# Patient Record
Sex: FEMALE | Race: BLACK OR AFRICAN AMERICAN | Employment: OTHER | URBAN - METROPOLITAN AREA
[De-identification: names, ages, dates, MRNs, and addresses within clinical notes are randomized per-mention and may not be internally consistent; named-entity substitution may affect disease eponyms.]

---

## 2017-01-03 DIAGNOSIS — E55.9 VITAMIN D DEFICIENCY: ICD-10-CM

## 2017-01-04 RX ORDER — NICOTINE 11MG/24HR
PATCH, TRANSDERMAL 24 HOURS TRANSDERMAL
Qty: 90 CAP | Refills: 1 | Status: SHIPPED | OUTPATIENT
Start: 2017-01-04 | End: 2017-08-02 | Stop reason: SDUPTHER

## 2017-01-04 RX ORDER — POTASSIUM CHLORIDE 20 MEQ/1
TABLET, EXTENDED RELEASE ORAL
Qty: 30 TAB | Refills: 5 | Status: SHIPPED | OUTPATIENT
Start: 2017-01-04 | End: 2017-08-14 | Stop reason: SDUPTHER

## 2017-01-05 RX ORDER — FUROSEMIDE 40 MG/1
TABLET ORAL
Qty: 30 TAB | Refills: 1 | Status: SHIPPED | OUTPATIENT
Start: 2017-01-05 | End: 2019-11-18

## 2017-01-09 ENCOUNTER — OFFICE VISIT (OUTPATIENT)
Dept: FAMILY MEDICINE CLINIC | Age: 42
End: 2017-01-09

## 2017-01-09 ENCOUNTER — TELEPHONE (OUTPATIENT)
Dept: FAMILY MEDICINE CLINIC | Age: 42
End: 2017-01-09

## 2017-01-09 VITALS
BODY MASS INDEX: 38.52 KG/M2 | TEMPERATURE: 98 F | RESPIRATION RATE: 18 BRPM | OXYGEN SATURATION: 98 % | SYSTOLIC BLOOD PRESSURE: 118 MMHG | HEIGHT: 67 IN | WEIGHT: 245.4 LBS | DIASTOLIC BLOOD PRESSURE: 72 MMHG | HEART RATE: 84 BPM

## 2017-01-09 DIAGNOSIS — G89.29 CHRONIC NECK PAIN: ICD-10-CM

## 2017-01-09 DIAGNOSIS — M54.2 CHRONIC NECK PAIN: ICD-10-CM

## 2017-01-09 DIAGNOSIS — F31.9 BIPOLAR AFFECTIVE DISORDER, REMISSION STATUS UNSPECIFIED (HCC): ICD-10-CM

## 2017-01-09 RX ORDER — CYCLOBENZAPRINE HCL 10 MG
TABLET ORAL
Qty: 20 TAB | Refills: 0 | Status: ON HOLD | OUTPATIENT
Start: 2017-01-09 | End: 2017-11-07

## 2017-01-09 RX ORDER — PHENTERMINE HYDROCHLORIDE 37.5 MG/1
37.5 TABLET ORAL
Qty: 30 TAB | Refills: 0 | Status: SHIPPED | OUTPATIENT
Start: 2017-01-09 | End: 2017-07-26 | Stop reason: SDUPTHER

## 2017-01-09 RX ORDER — ALPRAZOLAM 1 MG/1
1 TABLET ORAL
Status: CANCELLED | OUTPATIENT
Start: 2017-01-09

## 2017-01-09 NOTE — MR AVS SNAPSHOT
Visit Information Date & Time Provider Department Dept. Phone Encounter #  
 1/9/2017  2:15 PM Jonathon Kasper MD [facility] [phone] [id number] Follow-up Instructions Return in about 4 weeks (around 2/6/2017) for weight loss. Upcoming Health Maintenance Date Due  
 EYE EXAM RETINAL OR DILATED Q1 9/27/1987 Pneumococcal 19-64 Medium Risk (1 of 1 - PPSV23) 9/27/1996 PAP AKA CERVICAL CYTOLOGY 9/27/1998 MICROALBUMIN Q1 2/26/2017 HEMOGLOBIN A1C Q6M 5/11/2017 FOOT EXAM Q1 5/26/2017 LIPID PANEL Q1 11/11/2017 DTaP/Tdap/Td series (2 - Td) 3/10/2024 Allergies as of 1/9/2017  Review Complete On: 12/1/2016 By: Kvng Mcnamara NP Severity Noted Reaction Type Reactions Pcn [Penicillins] High 05/03/2010    Swelling Swelling throat Shellfish Containing Products High 03/06/2011    Swelling Swells throat and eyes Current Immunizations  Reviewed on 5/26/2016 Name Date Tdap 3/10/2014  5:21 PM  
  
 Not reviewed this visit You Were Diagnosed With   
  
 Codes Comments Bipolar affective disorder, remission status unspecified ([facility]    -  Primary ICD-10-CM: F31.9 ICD-9-CM: 296.80 Abnormal weight gain     ICD-10-CM: R63.5 ICD-9-CM: 783.1 Obesity, Class II, BMI 35-39.9, with comorbidity (HCC)     ICD-10-CM: E66.01 
ICD-9-CM: 278.01 Vitals BP Pulse Temp Resp Height(growth percentile) Weight(growth percentile) 118/72 (BP 1 Location: Left arm, BP Patient Position: Sitting) 84 98 °F (36.7 °C) (Oral) 18 5' 7\" (1.702 m) 245 lb 6.4 oz (111.3 kg) SpO2 BMI OB Status Smoking Status 98% 38.44 kg/m2 Hysterectomy Never Smoker BMI and BSA Data Body Mass Index Body Surface Area  
 38.44 kg/m 2 2.29 m 2 Preferred Pharmacy Pharmacy Name Phone [id number]. Jewel Males, [address] [phone] Your Updated Medication List  
  
   
 This list is accurate as of: 1/9/17  3:11 PM.  Always use your most recent med list.  
  
  
  
  
 amitriptyline 25 mg tablet Commonly known as:  ELAVIL TAKE 1 TABLET BY MOUTH NIGHTLY  
  
 aspirin delayed-release 81 mg tablet Take 81 mg by mouth daily. atenolol 25 mg tablet Commonly known as:  TENORMIN  
take 1 tablet by mouth once daily BD LUER-MARIELY SYRINGE 3 mL 25 gauge x 1\" Syrg Generic drug:  Syringe with Needle (Disp) USE TO INJECT 1 ML EVERY 2 WEEKS  
  
 cyanocobalamin 1,000 mcg/mL injection Commonly known as:  VITAMIN B12  
EVERY OTHER WEEK  
  
 cyclobenzaprine 10 mg tablet Commonly known as:  FLEXERIL  
take 1 tablet by mouth three times a day if needed for muscle spasm FISH OIL PO Take  by mouth daily. furosemide 40 mg tablet Commonly known as:  LASIX  
take 1 tablet by mouth once daily  
  
 gabapentin 300 mg capsule Commonly known as:  NEURONTIN Take 1 Cap by mouth three (3) times daily. glimepiride 2 mg tablet Commonly known as:  AMARYL  
take 1 tablet twice a day HAIR,SKIN AND NAILS PO Take  by mouth daily. insulin glargine 100 unit/mL (3 mL) pen Commonly known as:  LANTUS SOLOSTAR INJECT 30 UNITS DAILY LINZESS 145 mcg Cap capsule Generic drug:  linaclotide TAKE ONE CAPSULE BY MOUTH EVERY DAY BEFORE BREAKFAST  
  
 loratadine 10 mg tablet Commonly known as:  Mary Pace Take 1 Tab by mouth daily. metFORMIN  mg tablet Commonly known as:  GLUCOPHAGE XR  
take 1 tablet by mouth once daily  
  
 montelukast 10 mg tablet Commonly known as:  SINGULAIR  
take 1 tablet by mouth once daily MULTIVITAMIN PO Take  by mouth daily. NYSTOP powder Generic drug:  nystatin  
two (2) times a day. phentermine 37.5 mg tablet Commonly known as:  ADIPEX-P Take 1 Tab by mouth every morning. Max Daily Amount: 37.5 mg.  
  
 polyethylene glycol 17 gram packet Commonly known as:  Corina Aguilar Take 1 Packet by mouth daily. potassium chloride 20 mEq tablet Commonly known as:  K-DUR, KLOR-CON  
take 1 tablet by mouth once daily  
  
 pravastatin 40 mg tablet Commonly known as:  PRAVACHOL Take 1 Tab by mouth nightly. CHANGE IN THERAPY  
  
 traZODone 100 mg tablet Commonly known as:  DESYREL  
take 1 tablet by mouth at bedtime VITAMIN D3 2,000 unit Cap capsule Generic drug:  Cholecalciferol (Vitamin D3)  
take 1 capsule by mouth once daily VITAMIN E PO Take  by mouth daily. XANAX 1 mg tablet Generic drug:  ALPRAZolam  
Take 1 mg by mouth three (3) times daily as needed. Prescriptions Printed Refills  
 phentermine (ADIPEX-P) 37.5 mg tablet 0 Sig: Take 1 Tab by mouth every morning. Max Daily Amount: 37.5 mg.  
 Class: Print Route: Oral  
  
Prescriptions Sent to Pharmacy Refills  
 cyclobenzaprine (FLEXERIL) 10 mg tablet 0 Sig: take 1 tablet by mouth three times a day if needed for muscle spasm Class: Normal  
 Pharmacy: [address] #: [phone] We Performed the Following REFERRAL TO PSYCHIATRY [REF91 Custom] Follow-up Instructions Return in about 4 weeks (around 2/6/2017) for weight loss. Referral Information Referral ID Referred By Referred To  
  
 [id number] [place], [address] [address] [address] [place] [address] Phone: [phone] Fax: [phone] Visits Status Start Date End Date 1 New Request 1/9/17 1/9/18 If your referral has a status of pending review or denied, additional information will be sent to support the outcome of this decision. Patient Instructions Starting a Weight Loss Plan: Care Instructions Your Care Instructions If you are thinking about losing weight, it can be hard to know where to start. Your doctor can help you set up a weight loss plan that best meets your needs. You may want to take a class on nutrition or exercise, or join a weight loss support group. If you have questions about how to make changes to your eating or exercise habits, ask your doctor about seeing a registered dietitian or an exercise specialist. 
It can be a big challenge to lose weight. But you do not have to make huge changes at once. Make small changes, and stick with them. When those changes become habit, add a few more changes. If you do not think you are ready to make changes right now, try to pick a date in the future. Make an appointment to see your doctor to discuss whether the time is right for you to start a plan. Follow-up care is a key part of your treatment and safety. Be sure to make and go to all appointments, and call your doctor if you are having problems. Its also a good idea to know your test results and keep a list of the medicines you take. How can you care for yourself at home? · Set realistic goals. Many people expect to lose much more weight than is likely. A weight loss of 5% to 10% of your body weight may be enough to improve your health. · Get family and friends involved to provide support. Talk to them about why you are trying to lose weight, and ask them to help. They can help by participating in exercise and having meals with you, even if they may be eating something different. · Find what works best for you. If you do not have time or do not like to cook, a program that offers meal replacement bars or shakes may be better for you. Or if you like to prepare meals, finding a plan that includes daily menus and recipes may be best. 
· Ask your doctor about other health professionals who can help you achieve your weight loss goals. ¨ A dietitian can help you make healthy changes in your diet.  
¨ An exercise specialist or  can help you develop a safe and effective exercise program. 
¨ A counselor or psychiatrist can help you cope with issues such as depression, anxiety, or family problems that can make it hard to focus on weight loss. · Consider joining a support group for people who are trying to lose weight. Your doctor can suggest groups in your area. Where can you learn more? Go to http://marissa-marjan.info/. Enter X280 in the search box to learn more about \"Starting a Weight Loss Plan: Care Instructions. \" Current as of: February 16, 2016 Content Version: 11.1 © 0838-7356 [employer]. Care instructions adapted under license by [employer] (which disclaims liability or warranty for this information). If you have questions about a medical condition or this instruction, always ask your healthcare professional. [address] any warranty or liability for your use of this information. Introducing [facility]! Yony Rojas introduces [employer]ist patient portal. Now you can access parts of your medical record, email your doctor's office, and request medication refills online. 1. In your internet browser, go to [email or web address] 2. Click on the First Time User? Click Here link in the Sign In box. You will see the New Member Sign Up page. 3. Enter your [employer] Access Code exactly as it appears below. You will not need to use this code after youve completed the sign-up process. If you do not sign up before the expiration date, you must request a new code. · [employer] Access Code: [email or web address] Expires: 2/9/2017  9:20 AM 
 
4. Enter the last four digits of your Social Security Number (xxxx) and Date of Birth (mm/dd/yyyy) as indicated and click Submit. You will be taken to the next sign-up page. 5. Create a [employer] ID. This will be your [employer] login ID and cannot be changed, so think of one that is secure and easy to remember. 6. Create a [employer] password. You can change your password at any time. 7. Enter your Password Reset Question and Answer. This can be used at a later time if you forget your password. 8. Enter your e-mail address. You will receive e-mail notification when new information is available in [address] 9. Click Sign Up. You can now view and download portions of your medical record. 10. Click the Download Summary menu link to download a portable copy of your medical information. If you have questions, please visit the Frequently Asked Questions section of the [employer] website. Remember, [employer] is NOT to be used for urgent needs. For medical emergencies, dial 911. Now available from your iPhone and Android! Please provide this summary of care documentation to your next provider. Your primary care clinician is listed as Linn Juarez. If you have any questions after today's visit, please call [phone].

## 2017-01-09 NOTE — PATIENT INSTRUCTIONS

## 2017-01-09 NOTE — TELEPHONE ENCOUNTER
----- Message from Betty Bautista MD sent at 1/9/2017  4:21 PM EST -----  MJ, can you call the [facility] on file and discontinue any paper prescription of phentermine that Dr. Shahzad Bermudez previously signed? Pt has a new paper Rx for me today but I'd like to discontinue any standing Rx from Dr. Shahzad Bermudez if I am taking over her care. She will go to [facility] today with her new paper RX under my name. Thanks! Called [facility] and asked them to d/s any outstanding rx's of the phentermine from Dr Shahzad Bermudez.

## 2017-01-10 NOTE — PROGRESS NOTES
Patient Name: Elle Cabrales   MRN: [id number]    [address] [place] [address],[address] Stephanie Gutierrez is a 46 y.o. female who presents with the following: Transferring care from prior PCP Dr. Pita Sweet. Weight management  Started on phentermine on 4/29/16; 37.5 mg PO daily   Has not been taking it every day as prescribed but has been compliant for the past month. Feels like it works as her appetite is suppressed when taking it. Was down to 239 in 11/16 but now 245 lb today. Working diet but could improve on exercise regimen. Tolerating medication well without adverse side effects. Start weight: 254  Current weight:   Wt Readings from Last 3 Encounters:   01/09/17 245 lb 6.4 oz (111.3 kg)   12/01/16 244 lb (110.7 kg)   11/11/16 239 lb 6.4 oz (108.6 kg)     Hx of bipolar disorder  Previously followed by psychiatrist but has not re-establish care with a new psychiatrist since her prior doctor retired. Hx of inpatient psych stay in 9/2011 for SI. Denies current SI/HI but often struggles with mood swings and anxiety. Is only on amitriptyline and trazodone but otherwise not on a maintenance medication. Hx of chronic neck pain. Just establish care with Dr. Delores Dakins at [facility]. Has upcoming MRI of her neck next week to decide treatment plan. Has neck stiffness and pain which is most bothersome during sleep. Request for flexeril refill which helps her sleep and relax. Review of Systems   Constitutional: Negative. Negative for fever, malaise/fatigue and weight loss. Respiratory: Negative for cough, hemoptysis, shortness of breath and wheezing. Cardiovascular: Negative for chest pain, palpitations, leg swelling and PND. Gastrointestinal: Negative for abdominal pain, blood in stool, constipation, diarrhea, nausea and vomiting. Musculoskeletal: Positive for myalgias and neck pain. Psychiatric/Behavioral: Positive for depression.  Negative for hallucinations, memory loss, substance abuse and suicidal ideas. The patient is nervous/anxious. The patient does not have insomnia. The patient's medications, allergies, past medical history, surgical history, family history and social history were reviewed and updated where appropriate. Current Outpatient Prescriptions   Medication Sig    cyclobenzaprine (FLEXERIL) 10 mg tablet take 1 tablet by mouth three times a day if needed for muscle spasm    phentermine (ADIPEX-P) 37.5 mg tablet Take 1 Tab by mouth every morning. Max Daily Amount: 37.5 mg.    furosemide (LASIX) 40 mg tablet take 1 tablet by mouth once daily    potassium chloride (K-DUR, KLOR-CON) 20 mEq tablet take 1 tablet by mouth once daily    VITAMIN D3 2,000 unit cap capsule take 1 capsule by mouth once daily    atenolol (TENORMIN) 25 mg tablet take 1 tablet by mouth once daily    insulin glargine (LANTUS SOLOSTAR) 100 unit/mL (3 mL) pen INJECT 30 UNITS DAILY    pravastatin (PRAVACHOL) 40 mg tablet Take 1 Tab by mouth nightly. CHANGE IN THERAPY    metFORMIN ER (GLUCOPHAGE XR) 500 mg tablet take 1 tablet by mouth once daily    traZODone (DESYREL) 100 mg tablet take 1 tablet by mouth at bedtime    amitriptyline (ELAVIL) 25 mg tablet TAKE 1 TABLET BY MOUTH NIGHTLY    polyethylene glycol (MIRALAX) 17 gram packet Take 1 Packet by mouth daily.  montelukast (SINGULAIR) 10 mg tablet take 1 tablet by mouth once daily    glimepiride (AMARYL) 2 mg tablet take 1 tablet twice a day    MULTIVITAMIN PO Take  by mouth daily.  MULTIVITAMIN WITH MINERALS (HAIR,SKIN AND NAILS PO) Take  by mouth daily.  DOCOSAHEXANOIC ACID/EPA (FISH OIL PO) Take  by mouth daily.  VITAMIN E ACETATE (VITAMIN E PO) Take  by mouth daily.  LINZESS 145 mcg cap capsule TAKE ONE CAPSULE BY MOUTH EVERY DAY BEFORE BREAKFAST    gabapentin (NEURONTIN) 300 mg capsule Take 1 Cap by mouth three (3) times daily.     BD LUER-MARIELY SYRINGE 3 mL 25 gauge x 1\" syrg USE TO INJECT 1 ML EVERY 2 WEEKS    cyanocobalamin (VITAMIN B12) 1,000 mcg/mL injection EVERY OTHER WEEK    NYSTOP powder two (2) times a day.  loratadine (CLARITIN) 10 mg tablet Take 1 Tab by mouth daily.  aspirin delayed-release 81 mg tablet Take 81 mg by mouth daily. No current facility-administered medications for this visit. Allergies   Allergen Reactions    Pcn [Penicillins] Swelling     Swelling throat    Shellfish Containing Products Swelling     Swells throat and eyes       Past Medical History   Diagnosis Date    Anemia     Arthritis      lower back    Asthma      last attack 1994    Bipolar 1 disorder ([facility]     Chronic pain     Diabetes mellitus type 2, controlled ([facility] 9/14/2016     a1c 7.4% at outside EMR    GERD (gastroesophageal reflux disease)     High cholesterol     HTN, goal below 140/90     Migraines     Morbid obesity ([facility]     Unspecified sleep apnea      no cpap-NEVER TESTED       Past Surgical History   Procedure Laterality Date    Hx heent       wisdom teeth    Hx gyn       uterine ablation/BTL    Hx gyn       1 c section, 1 vaginal birth   [employer] breast surgery procedure unlisted  2/21/13      RIGHT BREAST EXCISION OF MULTIPLE SEBACEOUS CYSTS    Hx breast biopsy  6/5/2014     LEFT BREAST EXCISION SEBACEOUS CYST, RIGHT CHEST WALL SEBACEOUS CYST EXCISION performed by America Turcios MD at [facility] MAIN OR    Hx other surgical  1997     spinal tap - viral menigitis    Hx other surgical       SEBACEOUS CYST-BACK       Family History   Problem Relation Age of Onset    Cancer Maternal Aunt      OVARIAN;     Cancer Maternal Grandmother      bone cancer    Hypertension Mother     Diabetes Mother     Hypertension Father     Heart Disease Father      CHF    Cancer Paternal Grandmother 79     breast cancer    Anesth Problems Neg Hx        Social History     Social History    Marital status:      Spouse name: N/A    Number of children: N/A    Years of education: N/A     Occupational History    Not on file. Social History Main Topics    Smoking status: Never Smoker    Smokeless tobacco: Never Used    Alcohol use Yes      Comment: RARE    Drug use: No    Sexual activity: Not Currently     Birth control/ protection: None     Other Topics Concern    Not on file     Social History Narrative         OBJECTIVE      Visit Vitals    /72 (BP 1 Location: Left arm, BP Patient Position: Sitting)    Pulse 84    Temp 98 °F (36.7 °C) (Oral)    Resp 18    Ht 5' 7\" (1.702 m)    Wt 245 lb 6.4 oz (111.3 kg)    SpO2 98%    BMI 38.44 kg/m2       Physical Exam   Constitutional: She is oriented to person, place, and time and well-developed, well-nourished, and in no distress. No distress. HENT:   Head: Normocephalic and atraumatic. Right Ear: External ear normal.   Left Ear: External ear normal.   Eyes: Conjunctivae and EOM are normal. Pupils are equal, round, and reactive to light. Cardiovascular: Normal rate, regular rhythm and normal heart sounds. Exam reveals no gallop and no friction rub. No murmur heard. Pulmonary/Chest: Effort normal and breath sounds normal. No respiratory distress. She has no wheezes. Musculoskeletal: She exhibits no edema or deformity. Decreased ROM of cervical neck 2/2 pain. TTP along paraspinal cervical muscles. Neurological: She is alert and oriented to person, place, and time. No cranial nerve deficit. She exhibits normal muscle tone. Gait normal.   Skin: She is not diaphoretic. Psychiatric: Mood, memory, affect and judgment normal.   Nursing note and vitals reviewed. ASSESSMENT AND PLAN  Michael Pike is a 46 y.o. female who presents today for:     1. Obesity, Class II, BMI 35-39.9, with comorbidity ([address]  Pt has regain some prior weight due to lifestyle as well as medication non-compliance.    Will trial one more month of phentermine; if pt does not have continue weight loss success, consider referral to weight loss clinic for possible medication alternatives.  appropriate but does have some extra Rx on file at pharmacy under prior PCP Dr. Dejuan Sawyer name; will call pharmacy to discontinue any active Rx under Dr. Dejuan Sawyer name and give pt a new paper Rx with my name. - phentermine (ADIPEX-P) 37.5 mg tablet; Take 1 Tab by mouth every morning. Max Daily Amount: 37.5 mg.  Dispense: 30 Tab; Refill: 0    2. Bipolar affective disorder, remission status unspecified ([facility]  - REFERRAL TO PSYCHIATRY    3. Chronic neck pain  Will provide short term refill as pt undergoes work up with her new orthopedic doctor. - cyclobenzaprine (FLEXERIL) 10 mg tablet; take 1 tablet by mouth three times a day if needed for muscle spasm  Dispense: 20 Tab; Refill: 0    Medications Discontinued During This Encounter   Medication Reason    cyclobenzaprine (FLEXERIL) 10 mg tablet Reorder    phentermine (ADIPEX-P) 37.5 mg tablet Reorder    ALPRAZolam (XANAX) 1 mg tablet Not A Current Medication       Follow-up Disposition:  Return in about 4 weeks (around 2/6/2017) for weight loss. Time: 25 minutes was spent with this patient face to face discussing test results, follow up visits, and when repeat testing. I discussed diagnoses, risk factors and treatment for each based on current recommendations and literature. Greater than 50% of total visit time was spent in counseling and coordination of care. I have discussed the diagnosis with the patient and the intended plan as seen in the above orders. The patient has received an after-visit summary and questions were answered concerning future plans. I have discussed treatment side effects and warnings with the patient as well. Reviewed signs/symptoms of worsening condition that would require urgent evaluation.     Allie Keen M.D.

## 2017-02-07 ENCOUNTER — OFFICE VISIT (OUTPATIENT)
Dept: FAMILY MEDICINE CLINIC | Age: 42
End: 2017-02-07

## 2017-02-07 VITALS
TEMPERATURE: 98.4 F | WEIGHT: 244.13 LBS | SYSTOLIC BLOOD PRESSURE: 118 MMHG | RESPIRATION RATE: 18 BRPM | HEIGHT: 67 IN | OXYGEN SATURATION: 97 % | BODY MASS INDEX: 38.32 KG/M2 | HEART RATE: 71 BPM | DIASTOLIC BLOOD PRESSURE: 70 MMHG

## 2017-02-07 DIAGNOSIS — Z79.4 CONTROLLED TYPE 2 DIABETES MELLITUS WITHOUT COMPLICATION, WITH LONG-TERM CURRENT USE OF INSULIN (HCC): ICD-10-CM

## 2017-02-07 DIAGNOSIS — E78.00 HIGH CHOLESTEROL: ICD-10-CM

## 2017-02-07 DIAGNOSIS — E11.9 CONTROLLED TYPE 2 DIABETES MELLITUS WITHOUT COMPLICATION, WITH LONG-TERM CURRENT USE OF INSULIN (HCC): ICD-10-CM

## 2017-02-07 DIAGNOSIS — N64.59 ABNORMAL BREAST EXAM: ICD-10-CM

## 2017-02-07 DIAGNOSIS — Z71.3 WEIGHT LOSS COUNSELING, ENCOUNTER FOR: Primary | ICD-10-CM

## 2017-02-07 NOTE — PROGRESS NOTES
Patient Name: Elle Cabrales   MRN: [id number]    [address] [place] [address],[address] Stephanie Gutierrez is a 46 y.o. female who presents with the following:     Weight management  Started on phentermine on 4/29/16; 37.5 mg PO daily. Has not been taking it consistently due to cost and difficulty picking up medications on time. Has not been taking it for the past 2 weeks as she was not able to bring the new prescription to her pharmacy until now; still has the 1/9 RX available.  appropriate with last refill filled on 12/9/16. Is wondering if there is something \"stronger\" than phentermine. She is getting  in July and is worried she will not lose the weight in time. Is working on exercising regularly with her partner. Tends to have a smoothie in the morning and dinner at night; otherwise does not eat regularly. Start weight: 254    Wt Readings from Last 3 Encounters:   02/07/17 244 lb 2 oz (110.7 kg)   01/09/17 245 lb 6.4 oz (111.3 kg)   12/01/16 244 lb (110.7 kg)     Breast pain  Hx of multiple sebaceous cysts removed from her R and L breast. Feels that she feels a palpable lump along her R breast which she has had for a while but now it feels burning in nature when she touches it. Previously saw a breast surgeon in 2014 at [facility] but she has since retired. 7/8/2016 Mammogram/ultrasound:   BI-RADS 2, benign. There is no mammographic correlate to the palpable finding in the right breast. The accompanying breast ultrasound was also negative. Clinical followup is recommended. Any decision to biopsy should be based on clinical findings. There is no mammographic evidence of malignancy. Followup screening mammogram at age 38 is recommended. The patient was notified of these results. Review of Systems   Constitutional: Negative. Negative for fever, malaise/fatigue and weight loss. Respiratory: Negative for cough, hemoptysis, shortness of breath and wheezing.     Cardiovascular: Negative for chest pain, palpitations, leg swelling and PND. Gastrointestinal: Negative for abdominal pain, blood in stool, constipation, diarrhea, nausea and vomiting. Endo/Heme/Allergies:        R breast lump       The patient's medications, allergies, past medical history, surgical history, family history and social history were reviewed and updated where appropriate. Current Outpatient Prescriptions   Medication Sig    cyclobenzaprine (FLEXERIL) 10 mg tablet take 1 tablet by mouth three times a day if needed for muscle spasm    phentermine (ADIPEX-P) 37.5 mg tablet Take 1 Tab by mouth every morning. Max Daily Amount: 37.5 mg.    furosemide (LASIX) 40 mg tablet take 1 tablet by mouth once daily    potassium chloride (K-DUR, KLOR-CON) 20 mEq tablet take 1 tablet by mouth once daily    VITAMIN D3 2,000 unit cap capsule take 1 capsule by mouth once daily    atenolol (TENORMIN) 25 mg tablet take 1 tablet by mouth once daily    insulin glargine (LANTUS SOLOSTAR) 100 unit/mL (3 mL) pen INJECT 30 UNITS DAILY    pravastatin (PRAVACHOL) 40 mg tablet Take 1 Tab by mouth nightly. CHANGE IN THERAPY    metFORMIN ER (GLUCOPHAGE XR) 500 mg tablet take 1 tablet by mouth once daily    traZODone (DESYREL) 100 mg tablet take 1 tablet by mouth at bedtime    amitriptyline (ELAVIL) 25 mg tablet TAKE 1 TABLET BY MOUTH NIGHTLY    polyethylene glycol (MIRALAX) 17 gram packet Take 1 Packet by mouth daily.  montelukast (SINGULAIR) 10 mg tablet take 1 tablet by mouth once daily    glimepiride (AMARYL) 2 mg tablet take 1 tablet twice a day    MULTIVITAMIN PO Take  by mouth daily.  MULTIVITAMIN WITH MINERALS (HAIR,SKIN AND NAILS PO) Take  by mouth daily.  DOCOSAHEXANOIC ACID/EPA (FISH OIL PO) Take  by mouth daily.  VITAMIN E ACETATE (VITAMIN E PO) Take  by mouth daily.     LINZESS 145 mcg cap capsule TAKE ONE CAPSULE BY MOUTH EVERY DAY BEFORE BREAKFAST    gabapentin (NEURONTIN) 300 mg capsule Take 1 Cap by mouth three (3) times daily.    BD LUER-MARIELY SYRINGE 3 mL 25 gauge x 1\" syrg USE TO INJECT 1 ML EVERY 2 WEEKS    cyanocobalamin (VITAMIN B12) 1,000 mcg/mL injection EVERY OTHER WEEK    NYSTOP powder two (2) times a day.  loratadine (CLARITIN) 10 mg tablet Take 1 Tab by mouth daily.  aspirin delayed-release 81 mg tablet Take 81 mg by mouth daily. No current facility-administered medications for this visit. OBJECTIVE      Visit Vitals    /70 (BP 1 Location: Left arm, BP Patient Position: Sitting)    Pulse 71    Temp 98.4 °F (36.9 °C) (Oral)    Resp 18    Ht 5' 7\" (1.702 m)    Wt 244 lb 2 oz (110.7 kg)    SpO2 97%    BMI 38.24 kg/m2       Physical Exam   Constitutional: She is oriented to person, place, and time and well-developed, well-nourished, and in no distress. No distress. HENT:   Head: Normocephalic and atraumatic. Right Ear: External ear normal.   Left Ear: External ear normal.   Eyes: Conjunctivae and EOM are normal. Pupils are equal, round, and reactive to light. Neurological: She is alert and oriented to person, place, and time. She exhibits normal muscle tone. Gait normal.   Skin: She is not diaphoretic. Psychiatric: Mood, memory, affect and judgment normal.   Nursing note and vitals reviewed. ASSESSMENT AND PLAN  Adele Christopher is a 46 y.o. female who presents today for:     1. Weight loss counseling, encounter for  Will refer patient to Dr. Anton Santamaria for possible alternative weight loss medications as she would like to try something other than phentermine since she has been on this medication off and on for a while. She has lost weight while on it but has seemed to plateau ed in weight. Instructed pt to fill my last Rx of phentermine while she awaits setting up appt with Dr. Anton Santamaria.  - [facility]    2.  Abnormal breast exam  Last mammogram/ultrasound in July showed no findings but pt continues to feel abnormality on her self exam. Will place referral to breast surgery to discuss possible biopsy.  - REFERRAL TO BREAST SURGERY    3. Controlled type 2 diabetes mellitus without complication, with long-term current use of insulin ([address]  Will discuss at next visit. Pt to come to lab one week prior to appt in 2 months.  - HEMOGLOBIN A1C WITH EAG; Future    4. High cholesterol  Will discuss at next visit. - LIPID PANEL; Future  - METABOLIC PANEL, COMPREHENSIVE; Future    There are no discontinued medications. Follow-up Disposition:  Return in about 2 months (around 4/7/2017) for DM f/u. Time: 25 minutes was spent with this patient face to face discussing test results, follow up visits, and when repeat testing. I discussed diagnoses, risk factors and treatment for each based on current recommendations and literature. Greater than 50% of total visit time was spent in counseling and coordination of care. Medication risks/benefits/costs/interactions/alternatives discussed with patient. Advised patient to call back or return to office if symptoms worsen/change/persist. If patient cannot reach us or should anything more severe/urgent arise he/she should proceed directly to the nearest emergency department. Discussed expected course/resolution/complications of diagnosis in detail with patient. Patient given a written after visit summary which includes his/her diagnoses, current medications and vitals. Patient expressed understanding with the diagnosis and plan.      Eve Sanchez M.D.

## 2017-02-07 NOTE — PATIENT INSTRUCTIONS

## 2017-02-07 NOTE — PROGRESS NOTES
Patient presents in office today for 4 week follow up for weight management. Reviewed record in preparation for visit and have obtained necessary documentation. Identified pt with two pt identifiers(name and ). Patient due for   Health Maintenance Due   Topic    EYE EXAM RETINAL OR DILATED Q1     Pneumococcal 19-64 Medium Risk (1 of 1 - PPSV23)    PAP AKA CERVICAL CYTOLOGY     MICROALBUMIN Q1          Chief Complaint   Patient presents with    Weight Management     4 week follow up        Wt Readings from Last 3 Encounters:   17 244 lb 2 oz (110.7 kg)   17 245 lb 6.4 oz (111.3 kg)   16 244 lb (110.7 kg)     Temp Readings from Last 3 Encounters:   17 98.4 °F (36.9 °C) (Oral)   17 98 °F (36.7 °C) (Oral)   16 97.4 °F (36.3 °C) (Oral)     BP Readings from Last 3 Encounters:   17 118/70   17 118/72   16 129/79     Pulse Readings from Last 3 Encounters:   17 71   17 84   16 79           Learning Assessment:  :     Learning Assessment 2014   PRIMARY LEARNER Patient   HIGHEST LEVEL OF EDUCATION - PRIMARY LEARNER  GRADUATED HIGH SCHOOL OR GED   BARRIERS PRIMARY LEARNER NONE   CO-LEARNER CAREGIVER No   PRIMARY LANGUAGE ENGLISH    NEED No   LEARNER PREFERENCE PRIMARY DEMONSTRATION     READING     VIDEOS   ANSWERED BY patient   RELATIONSHIP SELF       Depression Screening:  :     PHQ 2 / 9, over the last two weeks 2017   Little interest or pleasure in doing things Several days   Feeling down, depressed or hopeless Several days   Total Score PHQ 2 2       Fall Risk Assessment:  :     Fall Risk Assessment, last 12 mths 2014   Able to walk? Yes   Fall in past 12 months?  No         Coordination of Care Questionnaire:  :     1) Have you been to an emergency room, urgent care clinic since your last visit? no   Hospitalized since your last visit? no             2) Have you seen or consulted any other health care providers outside of [address] [place] [address] since your last visit? no  (Include any pap smears or colon screenings in this section.)      Patient is accompanied by self I have received verbal consent from Moises Johnston to discuss any/all medical information while they are present in the room.

## 2017-02-07 NOTE — MR AVS SNAPSHOT
Visit Information Date & Time Provider Department Dept. Phone Encounter #  
 2/7/2017  9:30 AM Susan Sr  [facility] [address] [phone] [id number] Follow-up Instructions Return in about 2 months (around 4/7/2017) for DM f/u. Upcoming Health Maintenance Date Due  
 EYE EXAM RETINAL OR DILATED Q1 9/27/1987 Pneumococcal 19-64 Medium Risk (1 of 1 - PPSV23) 9/27/1996 PAP AKA CERVICAL CYTOLOGY 9/27/1998 MICROALBUMIN Q1 2/26/2017 HEMOGLOBIN A1C Q6M 5/11/2017 FOOT EXAM Q1 5/26/2017 LIPID PANEL Q1 11/11/2017 DTaP/Tdap/Td series (2 - Td) 3/10/2024 Allergies as of 2/7/2017  Review Complete On: 2/7/2017 By: Emily Berrios LPN Severity Noted Reaction Type Reactions Pcn [Penicillins] High 05/03/2010    Swelling Swelling throat Shellfish Containing Products High 03/06/2011    Swelling Swells throat and eyes Current Immunizations  Reviewed on 5/26/2016 Name Date Tdap 3/10/2014  5:21 PM  
  
 Not reviewed this visit You Were Diagnosed With   
  
 Codes Comments Weight loss counseling, encounter for    -  Primary ICD-10-CM: Z71.3 ICD-9-CM: V65.3 Vitals BP Pulse Temp Resp Height(growth percentile) Weight(growth percentile) 118/70 (BP 1 Location: Left arm, BP Patient Position: Sitting) 71 98.4 °F (36.9 °C) (Oral) 18 5' 7\" (1.702 m) 244 lb 2 oz (110.7 kg) SpO2 BMI OB Status Smoking Status 97% 38.24 kg/m2 Hysterectomy Never Smoker Vitals History BMI and BSA Data Body Mass Index Body Surface Area  
 38.24 kg/m 2 2.29 m 2 Preferred Pharmacy Pharmacy Name Phone [id number] [facility] J[place], [address] [phone] Your Updated Medication List  
  
   
This list is accurate as of: 2/7/17 10:25 AM.  Always use your most recent med list.  
  
  
  
  
 amitriptyline 25 mg tablet Commonly known as:  ELAVIL  
 TAKE 1 TABLET BY MOUTH NIGHTLY  
  
 aspirin delayed-release 81 mg tablet Take 81 mg by mouth daily. atenolol 25 mg tablet Commonly known as:  TENORMIN  
take 1 tablet by mouth once daily BD LUER-MARIELY SYRINGE 3 mL 25 gauge x 1\" Syrg Generic drug:  Syringe with Needle (Disp) USE TO INJECT 1 ML EVERY 2 WEEKS  
  
 cyanocobalamin 1,000 mcg/mL injection Commonly known as:  VITAMIN B12  
EVERY OTHER WEEK  
  
 cyclobenzaprine 10 mg tablet Commonly known as:  FLEXERIL  
take 1 tablet by mouth three times a day if needed for muscle spasm FISH OIL PO Take  by mouth daily. furosemide 40 mg tablet Commonly known as:  LASIX  
take 1 tablet by mouth once daily  
  
 gabapentin 300 mg capsule Commonly known as:  NEURONTIN Take 1 Cap by mouth three (3) times daily. glimepiride 2 mg tablet Commonly known as:  AMARYL  
take 1 tablet twice a day HAIR,SKIN AND NAILS PO Take  by mouth daily. insulin glargine 100 unit/mL (3 mL) pen Commonly known as:  LANTUS SOLOSTAR INJECT 30 UNITS DAILY LINZESS 145 mcg Cap capsule Generic drug:  linaclotide TAKE ONE CAPSULE BY MOUTH EVERY DAY BEFORE BREAKFAST  
  
 loratadine 10 mg tablet Commonly known as:  [place]oss [place] Take 1 Tab by mouth daily. metFORMIN  mg tablet Commonly known as:  GLUCOPHAGE XR  
take 1 tablet by mouth once daily  
  
 montelukast 10 mg tablet Commonly known as:  SINGULAIR  
take 1 tablet by mouth once daily MULTIVITAMIN PO Take  by mouth daily. NYSTOP powder Generic drug:  nystatin  
two (2) times a day. phentermine 37.5 mg tablet Commonly known as:  ADIPEX-P Take 1 Tab by mouth every morning. Max Daily Amount: 37.5 mg.  
  
 polyethylene glycol 17 gram packet Commonly known as:  [place] Take 1 Packet by mouth daily. potassium chloride 20 mEq tablet Commonly known as:  K-DUR, KLOR-CON  
take 1 tablet by mouth once daily pravastatin 40 mg tablet Commonly known as:  PRAVACHOL Take 1 Tab by mouth nightly. CHANGE IN THERAPY  
  
 traZODone 100 mg tablet Commonly known as:  DESYREL  
take 1 tablet by mouth at bedtime VITAMIN D3 2,000 unit Cap capsule Generic drug:  Cholecalciferol (Vitamin D3)  
take 1 capsule by mouth once daily VITAMIN E PO Take  by mouth daily. We Performed the Following REFERRAL TO BARIATRICS [KGE715 CPT(R)] Comments: For weight management medication. Prefer mornings. Follow-up Instructions Return in about 2 months (around 4/7/2017) for DM f/u. Referral Information Referral ID Referred By Referred To  
  
 [id number] Trupti Garcia MD   
   [facility], [address] [place] [address] Phone: [phone] Fax: [phone] Visits Status Start Date End Date 1 New Request 2/7/17 2/7/18 If your referral has a status of pending review or denied, additional information will be sent to support the outcome of this decision. Patient Instructions Learning About Diabetes Food Guidelines Your Care Instructions Meal planning is important to manage diabetes. It helps keep your blood sugar at a target level (which you set with your doctor). You don't have to eat special foods. You can eat what your family eats, including sweets once in a while. But you do have to pay attention to how often you eat and how much you eat of certain foods. You may want to work with a dietitian or a certified diabetes educator (CDE) to help you plan meals and snacks. A dietitian or CDE can also help you lose weight if that is one of your goals. What should you know about eating carbs? Managing the amount of carbohydrate (carbs) you eat is an important part of healthy meals when you have diabetes. Carbohydrate is found in many foods. · Learn which foods have carbs. And learn the amounts of carbs in different foods. ¨ Bread, cereal, pasta, and rice have about 15 grams of carbs in a serving. A serving is 1 slice of bread (1 ounce), ½ cup of cooked cereal, or 1/3 cup of cooked pasta or rice. ¨ Fruits have 15 grams of carbs in a serving. A serving is 1 small fresh fruit, such as an apple or orange; ½ of a banana; ½ cup of cooked or canned fruit; ½ cup of fruit juice; 1 cup of melon or raspberries; or 2 tablespoons of dried fruit. ¨ Milk and no-sugar-added yogurt have 15 grams of carbs in a serving. A serving is 1 cup of milk or 2/3 cup of no-sugar-added yogurt. ¨ Starchy vegetables have 15 grams of carbs in a serving. A serving is ½ cup of mashed potatoes or sweet potato; 1 cup winter squash; ½ of a small baked potato; ½ cup of cooked beans; or ½ cup cooked corn or green peas. · Learn how much carbs to eat each day and at each meal. A dietitian or CDE can teach you how to keep track of the amount of carbs you eat. This is called carbohydrate counting. · If you are not sure how to count carbohydrate grams, use the Plate Method to plan meals. It is a good, quick way to make sure that you have a balanced meal. It also helps you spread carbs throughout the day. ¨ Divide your plate by types of foods. Put non-starchy vegetables on half the plate, meat or other protein food on one-quarter of the plate, and a grain or starchy vegetable in the final quarter of the plate. To this you can add a small piece of fruit and 1 cup of milk or yogurt, depending on how many carbs you are supposed to eat at a meal. 
· Try to eat about the same amount of carbs at each meal. Do not \"save up\" your daily allowance of carbs to eat at one meal. 
· Proteins have very little or no carbs per serving. Examples of proteins are beef, chicken, turkey, fish, eggs, tofu, cheese, cottage cheese, and peanut butter. A serving size of meat is 3 ounces, which is about the size of a deck of cards.  Examples of meat substitute serving sizes (equal to 1 ounce of meat) are 1/4 cup of cottage cheese, 1 egg, 1 tablespoon of peanut butter, and ½ cup of tofu. How can you eat out and still eat healthy? · Learn to estimate the serving sizes of foods that have carbohydrate. If you measure food at home, it will be easier to estimate the amount in a serving of restaurant food. · If the meal you order has too much carbohydrate (such as potatoes, corn, or baked beans), ask to have a low-carbohydrate food instead. Ask for a salad or green vegetables. · If you use insulin, check your blood sugar before and after eating out to help you plan how much to eat in the future. · If you eat more carbohydrate at a meal than you had planned, take a walk or do other exercise. This will help lower your blood sugar. What else should you know? · Limit saturated fat, such as the fat from meat and dairy products. This is a healthy choice because people who have diabetes are at higher risk of heart disease. So choose lean cuts of meat and nonfat or low-fat dairy products. Use olive or canola oil instead of butter or shortening when cooking. · Don't skip meals. Your blood sugar may drop too low if you skip meals and take insulin or certain medicines for diabetes. · Check with your doctor before you drink alcohol. Alcohol can cause your blood sugar to drop too low. Alcohol can also cause a bad reaction if you take certain diabetes medicines. Follow-up care is a key part of your treatment and safety. Be sure to make and go to all appointments, and call your doctor if you are having problems. It's also a good idea to know your test results and keep a list of the medicines you take. Where can you learn more? Go to http://marissa-marjan.info/. Enter P221 in the search box to learn more about \"Learning About Diabetes Food Guidelines. \" Current as of: May 23, 2016 Content Version: 11.1 © 0616-3000 [employer].  Care instructions adapted under license by [address] (which disclaims liability or warranty for this information). If you have questions about a medical condition or this instruction, always ask your healthcare professional. [address] any warranty or liability for your use of this information. Introducing \[facility] [place] [facility]\"" & HEALTH SERVICES! [facility] introduces Tail patient portal. Now you can access parts of your medical record, email your doctor's office, and request medication refills online. 1. In your internet browser, go to [email or web address] 2. Click on the First Time User? Click Here link in the Sign In box. You will see the New Member Sign Up page. 3. Enter your Tail Access Code exactly as it appears below. You will not need to use this code after youve completed the sign-up process. If you do not sign up before the expiration date, you must request a new code. · Tail Access Code: [email or web address] Expires: 2/9/2017  9:20 AM 
 
4. Enter the last four digits of your Social Security Number (xxxx) and Date of Birth (mm/dd/yyyy) as indicated and click Submit. You will be taken to the next sign-up page. 5. Create a Tail ID. This will be your Tail login ID and cannot be changed, so think of one that is secure and easy to remember. 6. Create a Tail password. You can change your password at any time. 7. Enter your Password Reset Question and Answer. This can be used at a later time if you forget your password. 8. Enter your e-mail address. You will receive e-mail notification when new information is available in [address] 9. Click Sign Up. You can now view and download portions of your medical record. 10. Click the Download Summary menu link to download a portable copy of your medical information. If you have questions, please visit the Frequently Asked Questions section of the Tail website.  Remember, Tail is NOT to be used for urgent needs. For medical emergencies, dial 911. Now available from your iPhone and Android! Please provide this summary of care documentation to your next provider. Your primary care clinician is listed as Marion Byrd. If you have any questions after today's visit, please call [phone].

## 2017-02-15 RX ORDER — PHENTERMINE HYDROCHLORIDE 37.5 MG/1
TABLET ORAL
Qty: 30 TAB | Refills: 0 | OUTPATIENT
Start: 2017-02-15

## 2017-02-20 ENCOUNTER — OFFICE VISIT (OUTPATIENT)
Dept: FAMILY MEDICINE CLINIC | Age: 42
End: 2017-02-20

## 2017-02-20 VITALS
DIASTOLIC BLOOD PRESSURE: 81 MMHG | BODY MASS INDEX: 38.77 KG/M2 | RESPIRATION RATE: 18 BRPM | WEIGHT: 247 LBS | HEIGHT: 67 IN | SYSTOLIC BLOOD PRESSURE: 113 MMHG | HEART RATE: 58 BPM | TEMPERATURE: 98 F | OXYGEN SATURATION: 98 %

## 2017-02-20 DIAGNOSIS — E66.9 OBESITY (BMI 30-39.9): ICD-10-CM

## 2017-02-20 DIAGNOSIS — E11.69 CONTROLLED TYPE 2 DIABETES MELLITUS WITH OTHER SPECIFIED COMPLICATION: Primary | ICD-10-CM

## 2017-02-20 DIAGNOSIS — R06.83 SNORING: ICD-10-CM

## 2017-02-20 DIAGNOSIS — G47.30 UNSPECIFIED SLEEP APNEA: ICD-10-CM

## 2017-02-20 RX ORDER — LANCETS
EACH MISCELLANEOUS
Qty: 100 EACH | Refills: 3 | Status: ON HOLD | OUTPATIENT
Start: 2017-02-20 | End: 2017-11-07

## 2017-02-20 RX ORDER — TOPIRAMATE 25 MG/1
25 TABLET ORAL 2 TIMES DAILY WITH MEALS
Qty: 60 TAB | Refills: 6 | Status: SHIPPED | OUTPATIENT
Start: 2017-02-20 | End: 2017-10-09 | Stop reason: SDUPTHER

## 2017-02-20 NOTE — PROGRESS NOTES
Weight Loss Progress Note: Initial Physician Visit      Norma Peng is a 46 y.o. female with BMI 38.69 who is here for her Initial Evaluation for the medical bariatric care. CC: I want to be healthier  She has trouble with constipation    She has the habit of not eating all day and then after work starting at 6-8 pm she eats and that includes candy and other carbs    Weight History  Current weight 247 and BMI is Body mass index is 38.69 kg/(m^2). Goal weight 199   Highest [id number]  (See weight gain time line scanned into media section of chart)    Weight loss History  How many weight loss attempts have you had?  many  Which program were you most successful doing? Significant Medical History    Have you ever taken appetite suppressants? no   If yes: Rx or OTC? If yes; Any negative side effects? Ever diagnosed with sleep apnea or put on CPAP not tested yet, has FH and she snores and pauses in her sleep  She drinks a bottle of wine at night      Ever had bariatric surgery: no    Pregnant or planning on becoming pregnant w/in 6 months: no    If female:     Significant Psychosocial History   Any history of drug abuse or dependence: no  Current Major Lifestyle Changes: no  Any potential unsupportive people: no  Why are you starting a weight loss program now? Are you ready?  no    History of binge eating disorder or anorexia : no   If yes, are you currently being treated no    Social History  Social History   Substance Use Topics    Smoking status: Never Smoker    Smokeless tobacco: Never Used    Alcohol use Yes      Comment: RARE     How many times a week do you eat out?  0    Do you drink any EtOH? A bottle of sweet wine every night   If so, how much? Do you have upcoming any travel in the next 6 weeks?  no   If so, what do you have planned? Exercise  How many days a week do you currently exercise?  0 days  Have you ever been told by a physician not to exercise? no      Objective  Visit Vitals    /81 (BP 1 Location: Right arm, BP Patient Position: Sitting)    Pulse (!) 58    Temp 98 °F (36.7 °C) (Oral)    Resp 18    Ht 5' 7\" (1.702 m)    Wt 247 lb (112 kg)    SpO2 98%    BMI 38.69 kg/m2       Waist Circumference: See Weight Management Doc Flowsheet  Neck Circumference: See Weight Management Doc Flowsheet  Percent Body Fat: See Weight Management Doc Flowsheet  Weight Metrics 2/20/2017 2/20/2017 2/7/2017 1/9/2017 12/1/2016 11/11/2016 9/20/2016   Weight - 247 lb 244 lb 2 oz 245 lb 6.4 oz 244 lb 239 lb 6.4 oz 244 lb 12.8 oz   Neck Circ (inches) 14.5 - - - - - -   Waist Measure Inches 49 - - - - - -   Exercise Mins/week 0 - - - - - -   BMI - 38.69 kg/m2 38.24 kg/m2 38.44 kg/m2 38.22 kg/m2 37.5 kg/m2 38.34 kg/m2         Labs:       Review of Systems  Complete ROS negative except where noted above      Physical Exam    Vital Signs Reviewed  Weight Management Metrics Reviewed    Vital Signs Reviewed  Appearance: Obese, A&O x 3, NAD  HEENT:  NC/AT, EOMI, PERRL, No scleral icterus, malampatti score:4  Skin:    Skin tags - no   Acanthosis Nigricans - no  Neck:  No nodes, thyromegaly no  Heart:  RRR without M/R/G  Lungs:  CTAB, no rhonchi, rales, or wheezes with good air exchange   Abdomen:  Non-tender, pos bowel sounds; hepatomegaly - no  Ext:  No C/C/E        Assessment & Plan  Baljeet Villagomez was seen today for weight management. Diagnoses and all orders for this visit:    Controlled type 2 diabetes mellitus with other specified complication ([address]  -     glucose blood VI test strips (ASCENSIA CONTOUR) strip; E11.69  Test blood sugar 3 times a day  -     Lancets misc; E11.69    Unspecified sleep apnea  -     SLEEP MEDICINE REFERRAL    Obesity (BMI 30-39.9)  -     topiramate (TOPAMAX) 25 mg tablet; Take 1 Tab by mouth two (2) times daily (with meals).     Snoring        Based on his history and exam, India Wells is a good candidate for the Non-[facility] Weight Loss Program Diet Plan:day 1-4      Activity Plan:she has a treadmill    Medication Plan : reduce insulin to 15 units right now. Monitor blood sugar at least twice a day especially at bedtime      There are no Patient Instructions on file for this visit. Follow-up Disposition:  Return in about 2 weeks (around 3/6/2017). Over 50% of the 30 minutes face to face time with Kel consisted of counseling & coordinating and/or discussing treatment plans in reference to her obesity The primary encounter diagnosis was Controlled type 2 diabetes mellitus with other specified complication ([facility] Diagnoses of Unspecified sleep apnea, Obesity (BMI 30-39.9), and Snoring were also pertinent to this visit.

## 2017-02-20 NOTE — PROGRESS NOTES
1. Have you been to the ER, urgent care clinic since your last visit? Hospitalized since your last visit? No    2. Have you seen or consulted any other health care providers outside of the [facility] [place] since your last visit? Include any pap smears or colon screening.  No   Chief Complaint   Patient presents with    Weight Management

## 2017-03-02 ENCOUNTER — DOCUMENTATION ONLY (OUTPATIENT)
Dept: SLEEP MEDICINE | Age: 42
End: 2017-03-02

## 2017-03-02 ENCOUNTER — OFFICE VISIT (OUTPATIENT)
Dept: SLEEP MEDICINE | Age: 42
End: 2017-03-02

## 2017-03-02 VITALS
BODY MASS INDEX: 38.36 KG/M2 | WEIGHT: 244.4 LBS | DIASTOLIC BLOOD PRESSURE: 77 MMHG | OXYGEN SATURATION: 99 % | HEART RATE: 91 BPM | HEIGHT: 67 IN | SYSTOLIC BLOOD PRESSURE: 111 MMHG

## 2017-03-02 DIAGNOSIS — F31.9 BIPOLAR 1 DISORDER (HCC): ICD-10-CM

## 2017-03-02 DIAGNOSIS — I10 HTN, GOAL BELOW 140/90: ICD-10-CM

## 2017-03-02 DIAGNOSIS — G47.33 OSA (OBSTRUCTIVE SLEEP APNEA): Primary | ICD-10-CM

## 2017-03-02 NOTE — PROGRESS NOTES
[address] [facility], [place] [place]. [place], [address]  Tel.  [phone]  Fax. [address] [facility] [address]  [place], [address] [facility]  Tel.  [phone]  Fax. [phone] [facility] [address]   Tel.  [phone]  Fax. [phone]         Subjective:      Demetrius Clifford is an 46 y.o. female referred for evaluation for a sleep disorder. She complains of snoring associated with periods of not breathing. Symptoms began several years ago, unchanged since that time. She usually can fall asleep in 60 - 120 minutes. Family or house members note snoring. She denies completely or partially paralyzed while falling asleep or waking up. Kel Leroy does wake up frequently at night. She is not bothered by waking up too early and left unable to get back to sleep. She actually sleeps about 5 hours at night and wakes up about 3 times during the night. She does not work shifts: Ting Griffiths indicates she does get too little sleep at night. Her bedtime is 2300. She awakens at 0600. She does not take naps. She has the following observed behaviors: Loud snoring, Light snoring, Pauses in breathing, Grinding teeth, Twitching of legs or feet, Sleep talking; Nightmares. Other remarks: vivid dreams    [place] Sleepiness Score: 0    Allergies   Allergen Reactions    Pcn [Penicillins] Swelling     Swelling throat    Shellfish Containing Products Swelling     Swells throat and eyes         Current Outpatient Prescriptions:     topiramate (TOPAMAX) 25 mg tablet, Take 1 Tab by mouth two (2) times daily (with meals). , Disp: 60 Tab, Rfl: 6    glucose blood VI test strips (ASCENSIA CONTOUR) strip, E11.69 Test blood sugar 3 times a day, Disp: 100 Strip, Rfl: 3    Lancets misc, E11.69, Disp: 100 Each, Rfl: 3    cyclobenzaprine (FLEXERIL) 10 mg tablet, take 1 tablet by mouth three times a day if needed for muscle spasm, Disp: 20 Tab, Rfl: 0    phentermine (ADIPEX-P) 37.5 mg tablet, Take 1 Tab by mouth every morning. Max Daily Amount: 37.5 mg., Disp: 30 Tab, Rfl: 0    furosemide (LASIX) 40 mg tablet, take 1 tablet by mouth once daily, Disp: 30 Tab, Rfl: 1    potassium chloride (K-DUR, KLOR-CON) 20 mEq tablet, take 1 tablet by mouth once daily, Disp: 30 Tab, Rfl: 5    VITAMIN D3 2,000 unit cap capsule, take 1 capsule by mouth once daily, Disp: 90 Cap, Rfl: 1    atenolol (TENORMIN) 25 mg tablet, take 1 tablet by mouth once daily, Disp: 30 Tab, Rfl: 3    insulin glargine (LANTUS SOLOSTAR) 100 unit/mL (3 mL) pen, INJECT 30 UNITS DAILY, Disp: 15 Each, Rfl: 0    pravastatin (PRAVACHOL) 40 mg tablet, Take 1 Tab by mouth nightly. CHANGE IN THERAPY, Disp: 90 Tab, Rfl: 1    metFORMIN ER (GLUCOPHAGE XR) 500 mg tablet, take 1 tablet by mouth once daily, Disp: 90 Tab, Rfl: 3    traZODone (DESYREL) 100 mg tablet, take 1 tablet by mouth at bedtime, Disp: 90 Tab, Rfl: 3    amitriptyline (ELAVIL) 25 mg tablet, TAKE 1 TABLET BY MOUTH NIGHTLY, Disp: 30 Tab, Rfl: 11    polyethylene glycol (MIRALAX) 17 gram packet, Take 1 Packet by mouth daily. , Disp: 30 Each, Rfl: 5    montelukast (SINGULAIR) 10 mg tablet, take 1 tablet by mouth once daily, Disp: 30 Tab, Rfl: 6    glimepiride (AMARYL) 2 mg tablet, take 1 tablet twice a day, Disp: 60 Tab, Rfl: 6    MULTIVITAMIN PO, Take  by mouth daily. , Disp: , Rfl:     MULTIVITAMIN WITH MINERALS (HAIR,SKIN AND NAILS PO), Take  by mouth daily. , Disp: , Rfl:     DOCOSAHEXANOIC ACID/EPA (FISH OIL PO), Take  by mouth daily. , Disp: , Rfl:     VITAMIN E ACETATE (VITAMIN E PO), Take  by mouth daily. , Disp: , Rfl:     LINZESS 145 mcg cap capsule, TAKE ONE CAPSULE BY MOUTH EVERY DAY BEFORE BREAKFAST, Disp: 30 Cap, Rfl: 2    gabapentin (NEURONTIN) 300 mg capsule, Take 1 Cap by mouth three (3) times daily. , Disp: 180 Cap, Rfl: 3    BD LUER-MARIELY SYRINGE 3 mL 25 gauge x 1\" syrg, USE TO INJECT 1 ML EVERY 2 WEEKS, Disp: 4 Syringe, Rfl: 4    cyanocobalamin (VITAMIN B12) 1,000 mcg/mL injection, EVERY OTHER WEEK, Disp: , Rfl: 0    NYSTOP powder, two (2) times a day., Disp: , Rfl: 0    loratadine (CLARITIN) 10 mg tablet, Take 1 Tab by mouth daily. , Disp: 90 Tab, Rfl: 1    aspirin delayed-release 81 mg tablet, Take 81 mg by mouth daily. , Disp: , Rfl:      She  has a past medical history of Anemia; Arthritis; Asthma; Bipolar 1 disorder ([facility] Chronic pain; DDD (degenerative disc disease), cervical; Diabetes mellitus type 2, controlled ([facility] (9/14/2016); GERD (gastroesophageal reflux disease); High cholesterol; HTN, goal below 140/90; Migraines; Morbid obesity ([facility] and Unspecified sleep apnea. She  has a past surgical history that includes heent; gyn; gyn; breast surgery procedure unlisted (2/21/13); breast biopsy (6/5/2014); other surgical (1997); and other surgical.    She family history includes Cancer in her maternal aunt and maternal grandmother; Cancer (age of onset: 79) in her paternal grandmother; Diabetes in her mother; Heart Disease in her father; Hypertension in her father and mother. There is no history of Anesth Problems. She  reports that she has never smoked. She has never used smokeless tobacco. She reports that she drinks alcohol. She reports that she does not use illicit drugs.      Review of Systems:  Constitutional:  No significant weight loss or weight gain  Eyes:  No blurred vision  CVS:  No significant chest pain  Pulm:  No significant shortness of breath  GI:  No significant nausea or vomiting  :  No significant nocturia  Musculoskeletal:  No significant joint pain at night  Skin:  No significant rashes  Neuro:  No significant dizziness   Psych:  No active mood issues    Sleep Review of Systems: notable for difficulty falling asleep; infrequent awakenings at night;  regular dreaming noted; nightmares ; early morning headaches; memory problems;  concentration issues; no history of any automobile or occupational accidents due to daytime drowsiness. Objective:     Visit Vitals    /77    Pulse 91    Ht 5' 7\" (1.702 m)    Wt 244 lb 6.4 oz (110.9 kg)    SpO2 99%    BMI 38.28 kg/m2         General:   Not in acute distress   Eyes:  Anicteric sclerae, no obvious strabismus   Nose:  No obvious nasal septum deviation    Oropharynx:   Class 4 oropharyngeal outlet, thick tongue base, uvula could not be seen due to low-lying soft palate, narrow tonsilo-pharyngeal pilars   Tonsils:   tonsils are not seen due to low-lying soft palate   Neck:   Neck circ. in \"inches\": 15; midline trachea   Chest/Lungs:  Equal lung expansion, clear on auscultation    CVS:  Normal rate, regular rhythm; no JVD   Skin:  Warm to touch; no obvious rashes   Neuro:  No focal deficits ; no obvious tremor    Psych:  Normal affect,  normal countenance;          Assessment:       ICD-10-CM ICD-9-CM    1. SONY (obstructive sleep apnea) G47.33 327.23 SLEEP STUDY UNATTENDED, 4 CHANNEL      CANCELED: POLYSOMNOGRAPHY 1 NIGHT   2. Bipolar 1 disorder (HCC) F31.9 296.7    3. HTN, goal below 140/90 I10 401.9    4. BMI 38.0-38.9,adult Z68.38 V85.38          Plan:     * The patient currently has a Moderate Risk for having sleep apnea. STOP-BANG score 4.  * Sleep testing was ordered for initial evaluation. * She was provided information on sleep apnea including coresponding risk factors and the importance of proper treatment. * Treatment options if indicated were reviewed today. Patient agrees to a trial of PAP therapy if indicated. * Counseling was provided regarding proper sleep hygiene (including effect of light on sleep), paradoxical intention, stimulus control and safe driving. * Effect of sleep disturbance on weight was reviewed. We have recommended a dedicated weight loss through appropriate diet and an exercise regiment as significant weight reduction has been shown to reduce severity of obstructive sleep apnea.      * Telephone [phone]  follow-up shortly after sleep study to review results and plan final management.     (patient has given permission for a message to be left regarding test results and further management if patient cannot be cannot be reached directly). Thank you for allowing us to participate in your patient's medical care. We'll keep you updated on these investigations. Era Jiang MD, FAASM  Diplomate American Board of Sleep Medicine  Diplomate in Sleep Medicine - ABP  Electronically signed.

## 2017-03-02 NOTE — PATIENT INSTRUCTIONS
217 [facility] [place]. [place], [address]  Tel.  [phone]  Fax. 100 [facility] 60  [place], [address] [facility]  Tel.  [phone]  Fax. [phone] [address]  Tel.  [phone]  Fax. [phone]     Sleep Apnea: After Your Visit  Your Care Instructions  Sleep apnea occurs when you frequently stop breathing for 10 seconds or longer during sleep. It can be mild to severe, based on the number of times per hour that you stop breathing or have slowed breathing. Blocked or narrowed airways in your nose, mouth, or throat can cause sleep apnea. Your airway can become blocked when your throat muscles and tongue relax during sleep. Sleep apnea is common, occurring in 1 out of 20 individuals. Individuals having any of the following characteristics should be evaluated and treated right away due to high risk and detrimental consequences from untreated sleep apnea:  1. Obesity  2. Congestive Heart failure  3. Atrial Fibrillation  4. Uncontrolled Hypertension  5. Type II Diabetes  6. Night-time Arrhythmias  7. Stroke  8. Pulmonary Hypertension  9. High-risk Driving Populations (pilots, truck drivers, etc.)  10. Patients Considering Weight-loss Surgery    How do you know you have sleep apnea? You probably have sleep apnea if you answer 'yes' to 3 or more of the following questions:  S - Have you been told that you Snore? T - Are you often Tired during the day? O - Has anyone Observed you stop breathing while sleeping? P- Do you have (or are being treated for) high blood Pressure? B - Are you obese (Body Mass Index > 35)? A - Is your Age 48years old or older? N - Is your Neck size greater than 16 inches? G - Are you male Gender? A sleep physician can prescribe a breathing device that prevents tissues in the throat from blocking your airway.  Or your doctor may recommend using a dental device (oral breathing device) to help keep your airway open. In some cases, surgery may be needed to remove enlarged tissues in the throat. Follow-up care is a key part of your treatment and safety. Be sure to make and go to all appointments, and call your doctor if you are having problems. It's also a good idea to know your test results and keep a list of the medicines you take. How can you care for yourself at home? · Lose weight, if needed. It may reduce the number of times you stop breathing or have slowed breathing. · Go to bed at the same time every night. · Sleep on your side. It may stop mild apnea. If you tend to roll onto your back, sew a pocket in the back of your pajama top. Put a tennis ball into the pocket, and stitch the pocket shut. This will help keep you from sleeping on your back. · Avoid alcohol and medicines such as sleeping pills and sedatives before bed. · Do not smoke. Smoking can make sleep apnea worse. If you need help quitting, talk to your doctor about stop-smoking programs and medicines. These can increase your chances of quitting for good. · Prop up the head of your bed 4 to 6 inches by putting bricks under the legs of the bed. · Treat breathing problems, such as a stuffy nose, caused by a cold or allergies. · Use a continuous positive airway pressure (CPAP) breathing machine if lifestyle changes do not help your apnea and your doctor recommends it. The machine keeps your airway from closing when you sleep. · If CPAP does not help you, ask your doctor whether you should try other breathing machines. A bilevel positive airway pressure machine has two types of air pressureâone for breathing in and one for breathing out. Another device raises or lowers air pressure as needed while you breathe. · If your nose feels dry or bleeds when using one of these machines, talk with your doctor about increasing moisture in the air. A humidifier may help.   · If your nose is runny or stuffy from using a breathing machine, talk with your doctor about using decongestants or a corticosteroid nasal spray. When should you call for help? Watch closely for changes in your health, and be sure to contact your doctor if:  · You still have sleep apnea even though you have made lifestyle changes. · You are thinking of trying a device such as CPAP. · You are having problems using a CPAP or similar machine. Where can you learn more? Go to GuideWall. Enter D495 in the search box to learn more about \"Sleep Apnea: After Your Visit. \"   © 1036-7507 Healthwise, Incorporated. Care instructions adapted under license by Alexandra Portillo (which disclaims liability or warranty for this information). This care instruction is for use with your licensed healthcare professional. If you have questions about a medical condition or this instruction, always ask your healthcare professional. [facility] any warranty or liability for your use of this information. PROPER SLEEP HYGIENE    What to avoid  · Do not have drinks with caffeine, such as coffee or black tea, for 8 hours before bed. · Do not smoke or use other types of tobacco near bedtime. Nicotine is a stimulant and can keep you awake. · Avoid drinking alcohol late in the evening, because it can cause you to wake in the middle of the night. · Do not eat a big meal close to bedtime. If you are hungry, eat a light snack. · Do not drink a lot of water close to bedtime, because the need to urinate may wake you up during the night. · Do not read or watch TV in bed. Use the bed only for sleeping and sexual activity. What to try  · Go to bed at the same time every night, and wake up at the same time every morning. Do not take naps during the day. · Keep your bedroom quiet, dark, and cool. · Get regular exercise, but not within 3 to 4 hours of your bedtime. .  · Sleep on a comfortable pillow and mattress.   · If watching the clock makes you anxious, turn it facing away from you so you cannot see the time. · If you worry when you lie down, start a worry book. Well before bedtime, write down your worries, and then set the book and your concerns aside. · Try meditation or other relaxation techniques before you go to bed. · If you cannot fall asleep, get up and go to another room until you feel sleepy. Do something relaxing. Repeat your bedtime routine before you go to bed again. · Make your house quiet and calm about an hour before bedtime. Turn down the lights, turn off the TV, log off the computer, and turn down the volume on music. This can help you relax after a busy day. Drowsy Driving  The [address] [place] [address] Road Traffic Safety Administration cites drowsiness as a causing factor in more than 105,361 police reported crashes annually, resulting in 76,000 injuries and 1,500 deaths. Other surveys suggest 55% of people polled have driven while drowsy in the past year, 23% had fallen asleep but not crashed, 3% crashed, and 2% had and accident due to drowsy driving. Who is at risk? Young Drivers: One study of drowsy driving accidents states that 55% of the drivers were under 25 years. Of those, 75% were male. Shift Workers and Travelers: People who work overnight or travel across time zones frequently are at higher risk of experiencing Circadian Rhythm Disorders. They are trying to work and function when their body is programed to sleep. Sleep Deprived: Lack of sleep has a serious impact on your ability to pay attention or focus on a task. Consistently getting less than the average of 8 hours your body needs creates partial or cumulative sleep deprivation. Untreated Sleep Disorders: Sleep Apnea, Narcolepsy, R.L.S., and other sleep disorders (untreated) prevent a person from getting enough restful sleep. This leads to excessive daytime sleepiness and increases the risk for drowsy driving accidents by up to 7 times.   Medications / Alcohol: Even over the counter medications can cause drowsiness. Medications that impair a drivers attention should have a warning label. Alcohol naturally makes you sleepy and on its own can cause accidents. Combined with excessive drowsiness its effects are amplified. Signs of Drowsy Driving:   * You don't remember driving the last few miles   * You may drift out of your kimi   * You are unable to focus and your thoughts wander   * You may yawn more often than normal   * You have difficulty keeping your eyes open / nodding off   * Missing traffic signs, speeding, or tailgating  Prevention-   Good sleep hygiene, lifestyle and behavioral choices have the most impact on drowsy driving. There is no substitute for sleep and the average person requires 8 hours nightly. If you find yourself driving drowsy, stop and sleep. Consider the sleep hygiene tips provided during your visit as well. Medication Refill Policy: Refills for all medications require 1 week advance notice. Please have your pharmacy fax a refill request. We are unable to fax, or call in \"controled substance\" medications and you will need to pick these prescriptions up from our office. Language Cloud Activation    Thank you for requesting access to [employer] Cloud. Please follow the instructions below to securely access and download your online medical record. [employer] allows you to send messages to your doctor, view your test results, renew your prescriptions, schedule appointments, and more. How Do I Sign Up? 1. In your internet browser, go to [email or web address]. 2. Click on the First Time User? Click Here link in the Sign In box. You will see the New Member Sign Up page. 3. Enter your [employer] Access Code exactly as it appears below. You will not need to use this code after youve completed the sign-up process. If you do not sign up before the expiration date, you must request a new code.     Language Cloud Access Code: [email or web address]  Expires: 5/21/2017 11:44 AM (This is the date your [employer] access code will )    4. Enter the last four digits of your Social Security Number (xxxx) and Date of Birth (mm/dd/yyyy) as indicated and click Submit. You will be taken to the next sign-up page. 5. Create a Launchrt ID. This will be your [employer] login ID and cannot be changed, so think of one that is secure and easy to remember. 6. Create a [employer] password. You can change your password at any time. 7. Enter your Password Reset Question and Answer. This can be used at a later time if you forget your password. 8. Enter your e-mail address. You will receive e-mail notification when new information is available in [address] 9. Click Sign Up. You can now view and download portions of your medical record. 10. Click the Download Summary menu link to download a portable copy of your medical information. Additional Information    If you have questions, please call [phone]. Remember, [employer] is NOT to be used for urgent needs. For medical emergencies, dial 911.

## 2017-03-02 NOTE — PROGRESS NOTES
217 [facility] [place]. [place], [address]  Tel.  [phone]  Fax. 100 [facility] 60  [place], [address] [facility]  Tel.  [phone]  Fax. [phone] 9[address] [facility] [address]   Tel.  [phone]  Fax. [phone]       S>Kel Morales is a 46 y.o. female seen today to receive a home sleep testing unit (HST). · Patient was educated on proper hookup and operation of the HST. · Instruction forms and documentation were reviewed and signed. · The patient demonstrated good understanding of the HST. O>    Visit Vitals    /77    Pulse 91    Ht 5' 7\" (1.702 m)    Wt 244 lb 6.4 oz (110.9 kg)    SpO2 99%    BMI 38.28 kg/m2    Neck circ. in \"inches\": 15    A>  1. SONY (obstructive sleep apnea)    2. Bipolar 1 disorder ([address]    3. HTN, goal below 140/90    4. BMI 38.0-38.9,adult          P>  · General information regarding operations and maintenance of the device was provided. · She was provided information on sleep apnea including coresponding risk factors and the importance of proper treatment. · Follow-up appointment was made to return the HST. She will be contacted once the results have been reviewed. · She was asked to contact our office for any problems regarding her home sleep test study.

## 2017-03-03 ENCOUNTER — DOCUMENTATION ONLY (OUTPATIENT)
Dept: SLEEP MEDICINE | Age: 42
End: 2017-03-03

## 2017-03-03 NOTE — PROGRESS NOTES
HST failed due to lack of data (not enough time recorded). Device was returned to patient for repeat study.

## 2017-03-07 ENCOUNTER — HOSPITAL ENCOUNTER (OUTPATIENT)
Dept: SLEEP MEDICINE | Age: 42
Discharge: HOME | End: 2017-03-07
Payer: MEDICARE

## 2017-03-07 PROCEDURE — 95806 SLEEP STUDY UNATT&RESP EFFT: CPT

## 2017-03-08 ENCOUNTER — TELEPHONE (OUTPATIENT)
Dept: SLEEP MEDICINE | Age: 42
End: 2017-03-08

## 2017-03-08 DIAGNOSIS — G47.33 OSA (OBSTRUCTIVE SLEEP APNEA): Primary | ICD-10-CM

## 2017-03-08 NOTE — TELEPHONE ENCOUNTER
HSAT Returned-[facility]  Night One  Date of Study: 3/7/17    The following information was gathered from the patients study log:      Further information provided: No other log information provided.  2nd study-repeat

## 2017-03-14 NOTE — TELEPHONE ENCOUNTER
Results of Sleep Testing, PAP prescription and follow-up discussed with patient. Patient encouraged to call if there were any further questions regarding sleep symptoms. Encounter Diagnosis   Name Primary?  SONY (obstructive sleep apnea) Yes       Orders Placed This Encounter    AMB SUPPLY ORDER     Diagnosis: Obstructive Sleep Apnea ICD-10 Code (G47.33)    Positive Airway Pressure Therapy: Duration of need: 99 months. ResMed APAP Device: Minimum Pressure: 4 cmH2O, Maximum Pressure: 20 cmH2O. Ramp Time: 30 Minutes. EPR: 2. CPAP mask -  Patient preference, headgear, tubing, and filter;  heated humidifier; wireless modem. Remote monitoring enrollment. Caitlin Cantor MD, FAASM; NPI: [id number]  Diplomate American Board of Sleep Medicine  Diplomate in Sleep Medicine   Electronically signed.  Date: 03-14-17

## 2017-03-15 ENCOUNTER — DOCUMENTATION ONLY (OUTPATIENT)
Dept: SLEEP MEDICINE | Age: 42
End: 2017-03-15

## 2017-04-02 ENCOUNTER — APPOINTMENT (OUTPATIENT)
Dept: GENERAL RADIOLOGY | Age: 42
End: 2017-04-02
Attending: EMERGENCY MEDICINE
Payer: MEDICARE

## 2017-04-02 ENCOUNTER — HOSPITAL ENCOUNTER (EMERGENCY)
Age: 42
Discharge: HOME | End: 2017-04-02
Attending: EMERGENCY MEDICINE
Payer: MEDICARE

## 2017-04-02 VITALS
WEIGHT: 237.4 LBS | RESPIRATION RATE: 16 BRPM | BODY MASS INDEX: 39.55 KG/M2 | HEART RATE: 80 BPM | DIASTOLIC BLOOD PRESSURE: 69 MMHG | SYSTOLIC BLOOD PRESSURE: 110 MMHG | TEMPERATURE: 98.2 F | HEIGHT: 65 IN | OXYGEN SATURATION: 97 %

## 2017-04-02 DIAGNOSIS — M54.10 RADICULOPATHY, UNSPECIFIED SPINAL REGION: Primary | ICD-10-CM

## 2017-04-02 LAB
ALBUMIN SERPL BCP-MCNC: 4.2 G/DL (ref 3.5–5)
ALBUMIN/GLOB SERPL: 0.9 {RATIO} (ref 1.1–2.2)
ALP SERPL-CCNC: 67 U/L (ref 45–117)
ALT SERPL-CCNC: 30 U/L (ref 12–78)
ANION GAP BLD CALC-SCNC: 11 MMOL/L (ref 5–15)
APPEARANCE UR: ABNORMAL
AST SERPL W P-5'-P-CCNC: 18 U/L (ref 15–37)
BACTERIA URNS QL MICRO: NEGATIVE /HPF
BASOPHILS # BLD AUTO: 0 K/UL (ref 0–0.1)
BASOPHILS # BLD: 0 % (ref 0–1)
BILIRUB SERPL-MCNC: 0.4 MG/DL (ref 0.2–1)
BILIRUB UR QL: NEGATIVE
BUN SERPL-MCNC: 16 MG/DL (ref 6–20)
BUN/CREAT SERPL: 20 (ref 12–20)
CALCIUM SERPL-MCNC: 9.5 MG/DL (ref 8.5–10.1)
CHLORIDE SERPL-SCNC: 109 MMOL/L (ref 97–108)
CO2 SERPL-SCNC: 22 MMOL/L (ref 21–32)
COLOR UR: ABNORMAL
CREAT SERPL-MCNC: 0.8 MG/DL (ref 0.55–1.02)
EOSINOPHIL # BLD: 0.1 K/UL (ref 0–0.4)
EOSINOPHIL NFR BLD: 1 % (ref 0–7)
EPITH CASTS URNS QL MICRO: ABNORMAL /LPF
ERYTHROCYTE [DISTWIDTH] IN BLOOD BY AUTOMATED COUNT: 13 % (ref 11.5–14.5)
GLOBULIN SER CALC-MCNC: 4.6 G/DL (ref 2–4)
GLUCOSE SERPL-MCNC: 89 MG/DL (ref 65–100)
GLUCOSE UR STRIP.AUTO-MCNC: NEGATIVE MG/DL
HCT VFR BLD AUTO: 39.2 % (ref 35–47)
HGB BLD-MCNC: 13.3 G/DL (ref 11.5–16)
HGB UR QL STRIP: NEGATIVE
KETONES UR QL STRIP.AUTO: NEGATIVE MG/DL
LEUKOCYTE ESTERASE UR QL STRIP.AUTO: NEGATIVE
LYMPHOCYTES # BLD AUTO: 39 % (ref 12–49)
LYMPHOCYTES # BLD: 2.8 K/UL (ref 0.8–3.5)
MCH RBC QN AUTO: 28.8 PG (ref 26–34)
MCHC RBC AUTO-ENTMCNC: 33.9 G/DL (ref 30–36.5)
MCV RBC AUTO: 84.8 FL (ref 80–99)
MONOCYTES # BLD: 0.3 K/UL (ref 0–1)
MONOCYTES NFR BLD AUTO: 4 % (ref 5–13)
MUCOUS THREADS URNS QL MICRO: ABNORMAL /LPF
NEUTS SEG # BLD: 4 K/UL (ref 1.8–8)
NEUTS SEG NFR BLD AUTO: 56 % (ref 32–75)
NITRITE UR QL STRIP.AUTO: NEGATIVE
PH UR STRIP: 5 [PH] (ref 5–8)
PLATELET # BLD AUTO: 291 K/UL (ref 150–400)
POTASSIUM SERPL-SCNC: 3.2 MMOL/L (ref 3.5–5.1)
PROT SERPL-MCNC: 8.8 G/DL (ref 6.4–8.2)
PROT UR STRIP-MCNC: NEGATIVE MG/DL
RBC # BLD AUTO: 4.62 M/UL (ref 3.8–5.2)
RBC #/AREA URNS HPF: ABNORMAL /HPF (ref 0–5)
SODIUM SERPL-SCNC: 142 MMOL/L (ref 136–145)
SP GR UR REFRACTOMETRY: >1.03 (ref 1–1.03)
UA: UC IF INDICATED,UAUC: ABNORMAL
UROBILINOGEN UR QL STRIP.AUTO: 0.2 EU/DL (ref 0.2–1)
WBC # BLD AUTO: 7.1 K/UL (ref 3.6–11)
WBC URNS QL MICRO: ABNORMAL /HPF (ref 0–4)

## 2017-04-02 PROCEDURE — 80053 COMPREHEN METABOLIC PANEL: CPT | Performed by: EMERGENCY MEDICINE

## 2017-04-02 PROCEDURE — 74020 XR ABD FLAT/ ERECT: CPT

## 2017-04-02 PROCEDURE — 99283 EMERGENCY DEPT VISIT LOW MDM: CPT

## 2017-04-02 PROCEDURE — 74011636637 HC RX REV CODE- 636/637: Performed by: EMERGENCY MEDICINE

## 2017-04-02 PROCEDURE — 85025 COMPLETE CBC W/AUTO DIFF WBC: CPT | Performed by: EMERGENCY MEDICINE

## 2017-04-02 PROCEDURE — A9270 NON-COVERED ITEM OR SERVICE: HCPCS | Performed by: EMERGENCY MEDICINE

## 2017-04-02 PROCEDURE — 81001 URINALYSIS AUTO W/SCOPE: CPT | Performed by: EMERGENCY MEDICINE

## 2017-04-02 PROCEDURE — 36415 COLL VENOUS BLD VENIPUNCTURE: CPT | Performed by: EMERGENCY MEDICINE

## 2017-04-02 PROCEDURE — 74011250637 HC RX REV CODE- 250/637: Performed by: EMERGENCY MEDICINE

## 2017-04-02 RX ORDER — PREDNISONE 20 MG/1
60 TABLET ORAL
Status: COMPLETED | OUTPATIENT
Start: 2017-04-02 | End: 2017-04-02

## 2017-04-02 RX ORDER — OXYCODONE AND ACETAMINOPHEN 5; 325 MG/1; MG/1
1 TABLET ORAL
Status: COMPLETED | OUTPATIENT
Start: 2017-04-02 | End: 2017-04-02

## 2017-04-02 RX ADMIN — OXYCODONE HYDROCHLORIDE AND ACETAMINOPHEN 1 TABLET: 5; 325 TABLET ORAL at 23:34

## 2017-04-02 RX ADMIN — PREDNISONE 60 MG: 20 TABLET ORAL at 23:34

## 2017-04-03 ENCOUNTER — TELEPHONE (OUTPATIENT)
Dept: FAMILY MEDICINE CLINIC | Age: 42
End: 2017-04-03

## 2017-04-03 ENCOUNTER — PATIENT OUTREACH (OUTPATIENT)
Dept: FAMILY MEDICINE CLINIC | Age: 42
End: 2017-04-03

## 2017-04-03 NOTE — ED TRIAGE NOTES
Patient arrives to ED with a number of complaints, patient states neck pain, constipation, nausea, weakness and a headache, patient states she has not had a BM in @ 2 weeks, patient also states she is scheduled for [facility] surgery 4/26/17. Also cold chills.

## 2017-04-03 NOTE — PROGRESS NOTES
NNTOC-ED    Patient listed on discharge (Milam/MSSP) report on 17. Patient discharged from [facility] for radiculopathy. Contacted patient to perform post ED/UC discharge assessment. Verified  and address with patient as identifiers. Provided introduction to self, and explanation of the NN role. Performed medication reconciliation with patient, and patient verbalizes understanding of administration of home medications. Patient reports that she is completely out of her Flexeril, Glimerpiride and Singulair. This writer informed the patient that a message would be routed over to the provider regarding the needed refills. Reviewed discharge instructions with patient. Patient verbalizes understand of discharge instructions and follow-up care. Patient reports that she is waiting on a return call from her surgeon (Dr. Jd Smith) regarding her neck pain and will try to contact the office again today. Future Appointments  Date Time Provider Zafar Paulino   2017 11:15 AM Burnetta Merlin, MD [facility]   2017 10:20 AM Leslie Navas MD [facility] [address]   Reviewed red flags with patient, and patient verbalizes understanding. No other clinical/social/functional needs noted. Patient given an opportunity to ask questions. Contact information provided to the patient for future reference or further questions. Red Flags:  · You have new or worse symptoms in your arms, legs, chest, belly, or buttocks. Symptoms may include:  ¨ Numbness or tingling. ¨ Weakness. ¨ Pain.

## 2017-04-03 NOTE — Clinical Note
Patient reports that she is out of the following medications; Singulair, Glimerpiride and Flexeril. She is requesting that the Flexeril be filled for a 30 day supply.

## 2017-04-03 NOTE — ED PROVIDER NOTES
HPI Comments: 46 y.o. female with past medical history significant for morbid obesity, arthritis, asthma, GERD, bipolar 1, sleep apnea, anemia, DM type II, HTN, degenerative disc disease, and sebaceous cyst surgery who presents from home with chief complaint of neck pain. Pt complains of neck pain that radiates to her head, left shoulder and left elbow. Pt also complains that she has not had a bowel movement in two weeks, and has been taking Murelax without any relief. Pt also complains of nausea, and decreased appetite. Pt states that she was seen in the ED at [address] [place] [address] on 3/23/17 and was diagnosed with cervical radiculopathy. Pt reports that she has surgery scheduled on 4/26 [facility] Dr. Too Osorio. Pt describes a clicking sound in her neck when she turns her neck in bed. Pt reports that she has been taking percocet and flexeril for her pain. Pt states that she was prescribed prednisone on a sliding scale at [address] [place] [address] but hasn't been taking it. Pt notes that she has an appointment with her PCP this upcoming Friday. There are no other acute medical concerns at this time. PCP: Lyn Rubinstein, MD    Note written by Jacky Haney, as dictated by Eduard Coles MD 9:50 PM        The history is provided by the patient. No  was used.         Past Medical History:   Diagnosis Date    Anemia     Arthritis     lower back    Asthma     last attack 1994    Bipolar 1 disorder ([address]     Chronic pain     DDD (degenerative disc disease), cervical     followed by ortho    Diabetes mellitus type 2, controlled ([address] 9/14/2016    a1c 7.4% at outside EMR    GERD (gastroesophageal reflux disease)     High cholesterol     HTN, goal below 140/90     Migraines     Morbid obesity ([address]     Unspecified sleep apnea     no cpap-NEVER TESTED       Past Surgical History:   Procedure Laterality Date    BREAST SURGERY PROCEDURE UNLISTED  2/21/13     RIGHT BREAST EXCISION OF MULTIPLE SEBACEOUS CYSTS    HX BREAST BIOPSY  6/5/2014    LEFT BREAST EXCISION SEBACEOUS CYST, RIGHT CHEST WALL SEBACEOUS CYST EXCISION performed by Sebastian James MD at [facility] MAIN OR    HX GYN      uterine ablation/BTL    HX GYN      1 c section, 1 vaginal birth    HX HEENT      wisdom teeth    HX OTHER SURGICAL  1997    spinal tap - viral menigitis    HX OTHER SURGICAL      SEBACEOUS CYST-BACK         Family History:   Problem Relation Age of Onset    Cancer Maternal Aunt      OVARIAN;     Cancer Maternal Grandmother      bone cancer    Hypertension Mother     Diabetes Mother     Hypertension Father     Heart Disease Father      CHF    Cancer Paternal Grandmother 79     breast cancer    Anesth Problems Neg Hx        Social History     Social History    Marital status:      Spouse name: N/A    Number of children: N/A    Years of education: N/A     Occupational History    Not on file. Social History Main Topics    Smoking status: Never Smoker    Smokeless tobacco: Never Used    Alcohol use Yes      Comment: RARE    Drug use: No    Sexual activity: Not Currently     Birth control/ protection: None     Other Topics Concern    Not on file     Social History Narrative         ALLERGIES: Pcn [penicillins] and Shellfish containing products    Review of Systems   Constitutional: Positive for appetite change. Negative for activity change and fatigue. HENT: Negative for ear pain, facial swelling, sore throat and trouble swallowing. Eyes: Negative for pain, discharge and visual disturbance. Respiratory: Negative for chest tightness, shortness of breath and wheezing. Cardiovascular: Negative for chest pain and palpitations. Gastrointestinal: Positive for constipation and nausea. Negative for abdominal pain, blood in stool and vomiting. Genitourinary: Negative for difficulty urinating, flank pain and hematuria. Musculoskeletal: Positive for neck pain.  Negative for joint swelling. Skin: Negative for color change and rash. Neurological: Positive for headaches. Negative for dizziness, weakness and numbness. Hematological: Negative for adenopathy. Does not bruise/bleed easily. Psychiatric/Behavioral: Negative for behavioral problems, confusion and sleep disturbance. All other systems reviewed and are negative. Vitals:    04/02/17 2100   BP: 111/74   Pulse: 77   Resp: 16   Temp: 98.4 °F (36.9 °C)   SpO2: 97%   Weight: 107.7 kg (237 lb 6.4 oz)   Height: 5' 5\" (1.651 m)            Physical Exam   Constitutional: She is oriented to person, place, and time. She appears well-developed and well-nourished. No distress. HENT:   Head: Normocephalic and atraumatic. Nose: Nose normal.   Mouth/Throat: Oropharynx is clear and moist.   Eyes: Conjunctivae and EOM are normal. Pupils are equal, round, and reactive to light. No scleral icterus. Neck: Neck supple. No JVD present. No tracheal deviation present. No thyromegaly present. No carotid bruits noted. ROM restricted   Cardiovascular: Normal rate, regular rhythm, normal heart sounds and intact distal pulses. Exam reveals no gallop and no friction rub. No murmur heard. Pulmonary/Chest: Effort normal and breath sounds normal. No respiratory distress. She has no wheezes. She has no rales. She exhibits no tenderness. Abdominal: Soft. Bowel sounds are normal. She exhibits no distension and no mass. There is no tenderness. There is no rebound and no guarding. Musculoskeletal: She exhibits no edema. Discomfort in posterior neck. Spasms bilaterally. Lymphadenopathy:     She has no cervical adenopathy. Neurological: She is alert and oriented to person, place, and time. She has normal reflexes. No cranial nerve deficit. Coordination normal.   Skin: Skin is warm and dry. No rash noted. No erythema. Psychiatric: She has a normal mood and affect.  Her behavior is normal. Judgment and thought content normal.   Nursing note and vitals reviewed. Note written by Jacky Montelongo, as dictated by Melony Houston MD 9:50 PM        MDM  Number of Diagnoses or Management Options  Radiculopathy, unspecified spinal region: new and requires workup     Amount and/or Complexity of Data Reviewed  Clinical lab tests: ordered and reviewed  Tests in the radiology section of CPT®: ordered and reviewed  Decide to obtain previous medical records or to obtain history from someone other than the patient: yes  Independent visualization of images, tracings, or specimens: yes    Risk of Complications, Morbidity, and/or Mortality  Presenting problems: high  Diagnostic procedures: high  Management options: high    Patient Progress  Patient progress: stable    ED Course       Procedures    PROGRESS NOTE:  11:04 PM  Pt's abdominal x-ray is normal.      The patient's lab and x-ray are unremarkable. It does not appear that she has significant obstipation. She is noncompliant with her medications. The patient is instructed to take her medications as directed, continue to use [employer] for students and to follow up with our own physician in several days for further evaluation and treatment. She voices understanding of these instructions.

## 2017-04-03 NOTE — TELEPHONE ENCOUNTER
The patient states she did get diagnosed with sleep apnea; the patient states she is scheduled for neck surgery for 4-26-17 unless they move up the surgery due to complications; the patient wants to know when to schedule a follow up visit with Dr. Justin Kaiser for the weight loss/management issues she was having; the patient will be restricted to minimal movement for 6 weeks; the best contact number for the patient is [phone]; thank you    The patient would also like to know what she will be able to eat after surgery; they have warned her that she will probably have a sore throat from the tubes being removed after surgery; should she do jello, soup, etc.    Thank you

## 2017-04-03 NOTE — ED NOTES
Patient given verbal and written discharge instructions. Patient expressed understanding of these instructions. Patient discharged to home in stable condition in the care of her family to follow up with her doctor this week.

## 2017-04-05 NOTE — TELEPHONE ENCOUNTER
Spoke with patient and advised of message from Dr. Kelin Chowdhury. Patient verbalized understanding and had no questions at this time.

## 2017-04-05 NOTE — TELEPHONE ENCOUNTER
Initially she phi probably need to have liquids due to sore throat. Premier protein drink is a good drink for the meal replacements after discharge. She will need to eat as  as advised by the surgery staff while inpatient if she has to stay inpatient a while. When she is tolerating solids she can stil eat according to the template that I gave her. That is healthy eating. There should be enough protein there to heal fine.

## 2017-04-07 ENCOUNTER — OFFICE VISIT (OUTPATIENT)
Dept: FAMILY MEDICINE CLINIC | Age: 42
End: 2017-04-07

## 2017-04-07 ENCOUNTER — HOSPITAL ENCOUNTER (OUTPATIENT)
Dept: LAB | Age: 42
Discharge: HOME | End: 2017-04-07
Payer: MEDICARE

## 2017-04-07 VITALS
OXYGEN SATURATION: 98 % | WEIGHT: 244.25 LBS | BODY MASS INDEX: 40.69 KG/M2 | RESPIRATION RATE: 18 BRPM | DIASTOLIC BLOOD PRESSURE: 78 MMHG | SYSTOLIC BLOOD PRESSURE: 120 MMHG | TEMPERATURE: 98.1 F | HEART RATE: 70 BPM | HEIGHT: 65 IN

## 2017-04-07 DIAGNOSIS — Z01.818 PRE-OP EXAM: Primary | ICD-10-CM

## 2017-04-07 DIAGNOSIS — K59.00 CONSTIPATION, UNSPECIFIED CONSTIPATION TYPE: ICD-10-CM

## 2017-04-07 PROCEDURE — 83036 HEMOGLOBIN GLYCOSYLATED A1C: CPT

## 2017-04-07 PROCEDURE — 85610 PROTHROMBIN TIME: CPT

## 2017-04-07 NOTE — MR AVS SNAPSHOT
Visit Information Date & Time Provider Department Dept. Phone Encounter #  
 4/7/2017 11:15 AM Nolan Perez  [facility] [address] [phone] [id number] Follow-up Instructions Return in about 3 months (around 7/7/2017) for DM follow up. Your Appointments 5/30/2017 10:20 AM  
Any with [place] Goldberg[place]  
[id number] [facility] ([address]) Appt Note: 1st adherence, bring machine [place] [address] [facility] [address]  
  
   
 [address] [address] Upcoming Health Maintenance Date Due  
 EYE EXAM RETINAL OR DILATED Q1 9/27/1987 Pneumococcal 19-64 Medium Risk (1 of 1 - PPSV23) 9/27/1996 PAP AKA CERVICAL CYTOLOGY 9/27/1998 MICROALBUMIN Q1 2/26/2017 HEMOGLOBIN A1C Q6M 5/11/2017 FOOT EXAM Q1 5/26/2017 LIPID PANEL Q1 11/11/2017 DTaP/Tdap/Td series (2 - Td) 3/10/2024 Allergies as of 4/7/2017  Review Complete On: 4/7/2017 By: Amaury Puri LPN Severity Noted Reaction Type Reactions Pcn [Penicillins] High 05/03/2010    Swelling Swelling throat Shellfish Containing Products High 03/06/2011    Swelling Swells throat and eyes Current Immunizations  Reviewed on 5/26/2016 Name Date Tdap 3/10/2014  5:21 PM  
  
 Not reviewed this visit You Were Diagnosed With   
  
 Codes Comments Pre-op exam    -  Primary ICD-10-CM: U12.552 ICD-9-CM: V72.84 Constipation, unspecified constipation type     ICD-10-CM: K59.00 ICD-9-CM: 564.00 Vitals BP Pulse Temp Resp Height(growth percentile) Weight(growth percentile) 120/78 (BP 1 Location: Left arm, BP Patient Position: Sitting) 70 98.1 °F (36.7 °C) (Oral) 18 5' 5\" (1.651 m) 244 lb 4 oz (110.8 kg) SpO2 BMI OB Status Smoking Status 98% 40.65 kg/m2 Hysterectomy Never Smoker Vitals History BMI and BSA Data Body Mass Index Body Surface Area 40.65 kg/m 2 2.25 m 2 Preferred Pharmacy Pharmacy Name Phone [id number]- [place], [place] - [address] [phone] Your Updated Medication List  
  
   
This list is accurate as of: 4/7/17 11:57 AM.  Always use your most recent med list.  
  
  
  
  
 amitriptyline 25 mg tablet Commonly known as:  ELAVIL TAKE 1 TABLET BY MOUTH NIGHTLY  
  
 aspirin delayed-release 81 mg tablet Take 81 mg by mouth daily. atenolol 25 mg tablet Commonly known as:  TENORMIN  
take 1 tablet by mouth once daily BD LUER-MARIELY SYRINGE 3 mL 25 gauge x 1\" Syrg Generic drug:  Syringe with Needle (Disp) USE TO INJECT 1 ML EVERY 2 WEEKS  
  
 cyanocobalamin 1,000 mcg/mL injection Commonly known as:  VITAMIN B12  
EVERY OTHER WEEK  
  
 cyclobenzaprine 10 mg tablet Commonly known as:  FLEXERIL  
take 1 tablet by mouth three times a day if needed for muscle spasm FISH OIL PO Take  by mouth daily. furosemide 40 mg tablet Commonly known as:  LASIX  
take 1 tablet by mouth once daily  
  
 gabapentin 300 mg capsule Commonly known as:  NEURONTIN Take 1 Cap by mouth three (3) times daily. glimepiride 2 mg tablet Commonly known as:  AMARYL Take 1 Tab by mouth two (2) times a day. glucose blood VI test strips strip Commonly known as:  Ascensia CONTOUR  
E11.69 Test blood sugar 3 times a day HAIR,SKIN AND NAILS PO Take  by mouth daily. insulin glargine 100 unit/mL (3 mL) pen Commonly known as:  LANTUS SOLOSTAR INJECT 30 UNITS DAILY Lancets Misc E11.69  
  
 linaclotide 145 mcg Cap capsule Commonly known as:  Cadott Zelaya Take 1 Cap by mouth Daily (before breakfast). loratadine 10 mg tablet Commonly known as:  Manual Me Take 1 Tab by mouth daily. metFORMIN  mg tablet Commonly known as:  GLUCOPHAGE XR  
take 1 tablet by mouth once daily  
  
 montelukast 10 mg tablet Commonly known as:  SINGULAIR  
take 1 tablet by mouth once daily MULTIVITAMIN PO Take  by mouth daily. NYSTOP powder Generic drug:  nystatin  
two (2) times a day. phentermine 37.5 mg tablet Commonly known as:  ADIPEX-P Take 1 Tab by mouth every morning. Max Daily Amount: 37.5 mg.  
  
 polyethylene glycol 17 gram packet Commonly known as:  Willam Spotted Take 1 Packet by mouth daily. potassium chloride 20 mEq tablet Commonly known as:  K-DUR, KLOR-CON  
take 1 tablet by mouth once daily  
  
 pravastatin 40 mg tablet Commonly known as:  PRAVACHOL Take 1 Tab by mouth nightly. CHANGE IN THERAPY topiramate 25 mg tablet Commonly known as:  TOPAMAX Take 1 Tab by mouth two (2) times daily (with meals). traZODone 100 mg tablet Commonly known as:  DESYREL  
take 1 tablet by mouth at bedtime VITAMIN D3 2,000 unit Cap capsule Generic drug:  Cholecalciferol (Vitamin D3)  
take 1 capsule by mouth once daily VITAMIN E PO Take  by mouth daily. Prescriptions Sent to Pharmacy Refills  
 linaclotide (LINZESS) 145 mcg cap capsule 2 Sig: Take 1 Cap by mouth Daily (before breakfast). Class: Normal  
 Pharmacy: [facility]/pharmacy [address] [address] Ph #: [phone] Route: Oral  
  
We Performed the Following AMB POC EKG ROUTINE W/ 12 LEADS, INTER & REP [13452 CPT(R)] HEMOGLOBIN A1C WITH EAG [56607 CPT(R)] PROTHROMBIN TIME + INR [60064 CPT(R)] Follow-up Instructions Return in about 3 months (around 7/7/2017) for DM follow up. Patient Instructions Constipation: Care Instructions Your Care Instructions Constipation means that you have a hard time passing stools (bowel movements). People pass stools from 3 times a day to once every 3 days. What is normal for you may be different.  Constipation may occur with pain in the rectum and cramping. The pain may get worse when you try to pass stools. Sometimes there are small amounts of bright red blood on toilet paper or the surface of stools. This is because of enlarged veins near the rectum (hemorrhoids). A few changes in your diet and lifestyle may help you avoid ongoing constipation. Your doctor may also prescribe medicine to help loosen your stool. Some medicines can cause constipation. These include pain medicines and antidepressants. Tell your doctor about all the medicines you take. Your doctor may want to make a medicine change to ease your symptoms. Follow-up care is a key part of your treatment and safety. Be sure to make and go to all appointments, and call your doctor if you are having problems. It's also a good idea to know your test results and keep a list of the medicines you take. How can you care for yourself at home? · Drink plenty of fluids, enough so that your urine is light yellow or clear like water. If you have kidney, heart, or liver disease and have to limit fluids, talk with your doctor before you increase the amount of fluids you drink. · Include high-fiber foods in your diet each day. These include fruits, vegetables, beans, and whole grains. · Get at least 30 minutes of exercise on most days of the week. Walking is a good choice. You also may want to do other activities, such as running, swimming, cycling, or playing tennis or team sports. · Take a fiber supplement, such as Citrucel or Metamucil, every day. Read and follow all instructions on the label. · Schedule time each day for a bowel movement. A daily routine may help. Take your time having your bowel movement. · Support your feet with a small step stool when you sit on the toilet. This helps flex your hips and places your pelvis in a squatting position.  
· Your doctor may recommend an over-the-counter laxative to relieve your constipation. Examples are Milk of Magnesia and MiraLax. Read and follow all instructions on the label. Do not use laxatives on a long-term basis. When should you call for help? Call your doctor now or seek immediate medical care if: 
· You have new or worse belly pain. · You have new or worse nausea or vomiting. · You have blood in your stools. Watch closely for changes in your health, and be sure to contact your doctor if: 
· Your constipation is getting worse. · You do not get better as expected. Where can you learn more? Go to http://marissa-marjan.info/. Enter 21  in the search box to learn more about \"Constipation: Care Instructions. \" Current as of: May 27, 2016 Content Version: 11.2 © 4240-6629 [employer] Care instructions adapted under license by [employer] (which disclaims liability or warranty for this information). If you have questions about a medical condition or this instruction, always ask your healthcare professional. [place] [address] any warranty or liability for your use of this information. Introducing [facility]! Brando Jenkins introduces ITeam patient portal. Now you can access parts of your medical record, email your doctor's office, and request medication refills online. 1. In your internet browser, go to [email or web address] 2. Click on the First Time User? Click Here link in the Sign In box. You will see the New Member Sign Up page. 3. Enter your ITeam Access Code exactly as it appears below. You will not need to use this code after youve completed the sign-up process. If you do not sign up before the expiration date, you must request a new code. · ITeam Access Code: [email or web address] Expires: 5/21/2017 12:44 PM 
 
4. Enter the last four digits of your Social Security Number (xxxx) and Date of Birth (mm/dd/yyyy) as indicated and click Submit.  You will be taken to the next sign-up page. 5. Create a [facility] [employer] ID. This will be your [employer] login ID and cannot be changed, so think of one that is secure and easy to remember. 6. Create a [employer] password. You can change your password at any time. 7. Enter your Password Reset Question and Answer. This can be used at a later time if you forget your password. 8. Enter your e-mail address. You will receive e-mail notification when new information is available in [address]. 9. Click Sign Up. You can now view and download portions of your medical record. 10. Click the Download Summary menu link to download a portable copy of your medical information. If you have questions, please visit the Frequently Asked Questions section of the [employer] website. Remember, [facility] is NOT to be used for urgent needs. For medical emergencies, dial 911. Now available from your iPhone and Android! Please provide this summary of care documentation to your next provider. Your primary care clinician is listed as Marion Byrd. If you have any questions after today's visit, please call [phone].

## 2017-04-07 NOTE — PROGRESS NOTES
Patient Name: Kimani Ortiz   MRN: [id number]    Anjel Granados is a 46 y.o. female who presents with the following:     Pre Op  Procedure: anterior cervical fusion C4-C5  Expected Date: 4/26/2017  Surgeon: Dr. Ashley Newman  Hx of complications with general anesthesia: none  Signs and symptoms of cardiovascular disease:  none  Have any of the following: CVA, CHF, Cr > 2.0, insulin-dependent DM, ischemic cardiac disease, or suprainguinal vascular/intrathroacic/intraabdominal surgery:  Insulin-dependent DM  Able to meet > 4 METS: yes  No prior bleeding problems or history of pulmonary emboli. No allergy to latex, tape, or adhesives. Patient recently underwent a sleep study and was diagnosed with SONY; awaiting CPAP machine. Patient requests either Percocet or Tramadol for her chronic neck pain. Immunization History   Administered Date(s) Administered    Tdap 03/10/2014     Constipation  Long standing history of constipation. Was previously on Linzess which helped initially; last Rx given 6/2016 by prior PCP on 6/2016. No prior colonoscopy or GI referral.  No family hx of CRC. Weight loss  Request refill of phentermine (last refill on 3/10/2017). Previously saw Dr. Gilson Singh who started patient on Topamax. Review of Systems   Constitutional: Negative for fever, malaise/fatigue and weight loss. Respiratory: Negative for cough, hemoptysis, shortness of breath and wheezing. Cardiovascular: Negative for chest pain, palpitations, leg swelling and PND. Gastrointestinal: Positive for constipation. Negative for abdominal pain, blood in stool, diarrhea, melena, nausea and vomiting. Musculoskeletal: Positive for neck pain. All other systems reviewed and are negative. The patient's medications, allergies, past medical history, surgical history, family history and social history were reviewed and updated where appropriate.       Prior to Admission medications    Medication Sig Start Date End Date Taking? Authorizing Provider   glimepiride (AMARYL) 2 mg tablet Take 1 Tab by mouth two (2) times a day. 4/4/17  Yes Ravi Ashby MD   topiramate (TOPAMAX) 25 mg tablet Take 1 Tab by mouth two (2) times daily (with meals). 2/20/17  Yes Adam Torres MD   glucose blood VI test strips (ASCENSIA CONTOUR) strip E11.69  Test blood sugar 3 times a day 2/20/17  Yes Adam Torres MD   Lancets misc E11.69 2/20/17  Yes Adam Torres MD   cyclobenzaprine (FLEXERIL) 10 mg tablet take 1 tablet by mouth three times a day if needed for muscle spasm 1/9/17  Yes Ravi Ashby MD   phentermine (ADIPEX-P) 37.5 mg tablet Take 1 Tab by mouth every morning. Max Daily Amount: 37.5 mg. 1/9/17  Yes Ravi Ashby MD   furosemide (LASIX) 40 mg tablet take 1 tablet by mouth once daily 1/5/17  Yes Eric Magallanes DO   potassium chloride (K-DUR, KLOR-CON) 20 mEq tablet take 1 tablet by mouth once daily 1/4/17  Yes Eric Magallanes DO   VITAMIN D3 2,000 unit cap capsule take 1 capsule by mouth once daily 1/4/17  Yes Eric Magallanes DO   atenolol (TENORMIN) 25 mg tablet take 1 tablet by mouth once daily 12/20/16  Yes Eric Magallanes DO   insulin glargine (LANTUS SOLOSTAR) 100 unit/mL (3 mL) pen INJECT 30 UNITS DAILY 12/1/16  Yes Monica Jaime, NP   pravastatin (PRAVACHOL) 40 mg tablet Take 1 Tab by mouth nightly. CHANGE IN THERAPY 11/29/16  Yes Eric Magallanes DO   metFORMIN ER (GLUCOPHAGE XR) 500 mg tablet take 1 tablet by mouth once daily 11/10/16  Yes Eric Magallanes DO   traZODone (DESYREL) 100 mg tablet take 1 tablet by mouth at bedtime 11/10/16  Yes Eric Magallanes DO   amitriptyline (ELAVIL) 25 mg tablet TAKE 1 TABLET BY MOUTH NIGHTLY 10/6/16  Yes Williston Furth, DO   polyethylene glycol (MIRALAX) 17 gram packet Take 1 Packet by mouth daily.  8/23/16  Yes Eric Magallanes, DO   montelukast (SINGULAIR) 10 mg tablet take 1 tablet by mouth once daily 8/11/16  Yes Eric Magallanes, DO   MULTIVITAMIN PO Take by mouth daily. Yes Historical Provider   MULTIVITAMIN WITH MINERALS (HAIR,SKIN AND NAILS PO) Take  by mouth daily. Yes Historical Provider   DOCOSAHEXANOIC ACID/EPA (FISH OIL PO) Take  by mouth daily. Yes Historical Provider   VITAMIN E ACETATE (VITAMIN E PO) Take  by mouth daily. Yes Historical Provider   LINZESS 145 mcg cap capsule TAKE ONE CAPSULE BY MOUTH EVERY DAY BEFORE BREAKFAST 6/30/16  Yes Baeza Fat, DO   gabapentin (NEURONTIN) 300 mg capsule Take 1 Cap by mouth three (3) times daily. 6/28/16  Yes Baeza Fat, DO   BD LUER-MARIELY SYRINGE 3 mL 25 gauge x 1\" syrg USE TO INJECT 1 ML EVERY 2 WEEKS 4/22/16  Yes Baeza Fat, DO   cyanocobalamin (VITAMIN B12) 1,000 mcg/mL injection EVERY OTHER WEEK 2/19/16  Yes Historical Provider   NYSTOP powder two (2) times a day. 2/22/16  Yes Historical Provider   loratadine (CLARITIN) 10 mg tablet Take 1 Tab by mouth daily. 2/26/16  Yes Baeza Fat, DO   aspirin delayed-release 81 mg tablet Take 81 mg by mouth daily.      Yes Historical Provider       Allergies   Allergen Reactions    Pcn [Penicillins] Swelling     Swelling throat    Shellfish Containing Products Swelling     Swells throat and eyes         Past Medical History:   Diagnosis Date    Arthritis     lower back    Asthma     last attack 1994    Bipolar 1 disorder ([address]     Chronic neck pain     DDD (degenerative disc disease), cervical     followed by ortho    Diabetes mellitus type 2, controlled ([address] 9/14/2016    a1c 7.4% at outside EMR    GERD (gastroesophageal reflux disease)     High cholesterol     HTN, goal below 140/90     Migraines     Morbid obesity ([address]     SONY (obstructive sleep apnea)        Past Surgical History:   Procedure Laterality Date    BREAST SURGERY PROCEDURE UNLISTED  2/21/13     RIGHT BREAST EXCISION OF MULTIPLE SEBACEOUS CYSTS    HX BREAST BIOPSY  6/5/2014    LEFT BREAST EXCISION SEBACEOUS CYST, RIGHT CHEST WALL SEBACEOUS CYST EXCISION performed by Nate Bustamante MD at \[facility]"" MAIN OR    HX  SECTION      HX HEENT      wisdom teeth    HX OTHER SURGICAL      spinal tap - viral menigitis    HX OTHER SURGICAL      SEBACEOUS CYST-BACK    HX TOTAL LAPAROSCOPIC HYSTERECTOMY W/ BS&O         Family History   Problem Relation Age of Onset    Cancer Maternal Aunt      OVARIAN;     Cancer Maternal Grandmother      bone cancer    Hypertension Mother     Diabetes Mother     Hypertension Father     Heart Disease Father      CHF    Cancer Paternal Grandmother 79     breast cancer    Anesth Problems Neg Hx        Social History     Social History    Marital status:      Spouse name: N/A    Number of children: N/A    Years of education: N/A     Occupational History    Not on file. Social History Main Topics    Smoking status: Never Smoker    Smokeless tobacco: Never Used    Alcohol use Yes      Comment: RARE    Drug use: No    Sexual activity: Not Currently     Birth control/ protection: None     Other Topics Concern    Not on file     Social History Narrative           OBJECTIVE    Visit Vitals    /78 (BP 1 Location: Left arm, BP Patient Position: Sitting)    Pulse 70    Temp 98.1 °F (36.7 °C) (Oral)    Resp 18    Ht 5' 5\" (1.651 m)    Wt 244 lb 4 oz (110.8 kg)    SpO2 98%    BMI 40.65 kg/m2       Physical Exam   Constitutional: She is well-developed, well-nourished, and in no distress. No distress. HENT:   Head: Normocephalic and atraumatic. Right Ear: External ear normal.   Left Ear: External ear normal.   Eyes: Conjunctivae and EOM are normal. Pupils are equal, round, and reactive to light. Cardiovascular: Normal rate, regular rhythm and normal heart sounds. Exam reveals no gallop and no friction rub. No murmur heard. Pulmonary/Chest: Effort normal and breath sounds normal. No respiratory distress. She has no wheezes. Abdominal: Soft.  Bowel sounds are normal. She exhibits no distension and no mass. There is no tenderness. There is no rebound and no guarding. Skin: She is not diaphoretic. Psychiatric: Mood, memory, affect and judgment normal.   Nursing note and vitals reviewed. EKG: normal sinus rhythm, LVH. ASSESSMENT AND PLAN    1. Pre-op exam  Risk of cardiac death and nonfatal myocardial infarction for noncardiac surgical procedures:  Intermediate (1% to 5%) due to orthopedic/head and neck surgery. Revised Cardiac Risk Index of a major cardiac event is 0.9% given history of diabetes mellitus requiring insulin. Patient has one intermediate clinical predictor of perioperative cardiac complications given history of diabetes. These risk calculators have been reviewed with the patient who expressed understanding of the relative cardiac risk of his/her upcoming procedure given his/her current risk factors. Will await remaining labs before providing cardiac clearance. Discussed pt to hold metformin and diuretics at least one day prior to surgery; hold glimepiride day of surgery; 50% of normal long acting insulin dose on morning of surgery; avoid NSAIDS/ASA 7 days prior to surgery. Addendum 4/10/2017 1:53 PM: A1C well controlled and normal PT/INR. According to the [employer] of Cardiology and AHA Guidelines of perioperative cardiovascular evaluation for noncardiac surgery, patient is cleared for anterior cervical fusion C4-C5 (one intermediate clinical predictor, intermediate risk procedure, > 4 METS). - AMB POC EKG ROUTINE W/ 12 LEADS, INTER & REP  - HEMOGLOBIN A1C WITH EAG  - PROTHROMBIN TIME + INR    2. Constipation, unspecified constipation type  Will continue Linzess as it is helped in the past but discussed the need for GI referral for possible colonoscopy to rule out anatomical causes of chronic constipation as this has not yet been evaluated. - linaclotide (LINZESS) 145 mcg cap capsule; Take 1 Cap by mouth Daily (before breakfast). Dispense: 30 Cap; Refill: 2    3. BMI 40.0-44.9, adult ([facility]  Pt has upcoming appointment with  Dr. Guera Dorantes to clarify if pt should be on Topamax only treatment for weight loss. I discussed the Topamax can be used for weight loss. Medications Discontinued During This Encounter   Medication Reason    LINZESS 145 mcg cap capsule Reorder       Follow-up Disposition:  Return in about 3 months (around 7/7/2017) for DM follow up. Medication risks/benefits/costs/interactions/alternatives discussed with patient. Advised patient to call back or return to office if symptoms worsen/change/persist. If patient cannot reach us or should anything more severe/urgent arise he/she should proceed directly to the nearest emergency department. Discussed expected course/resolution/complications of diagnosis in detail with patient. Patient given a written after visit summary which includes his/her diagnoses, current medications and vitals. Patient expressed understanding with the diagnosis and plan.      Desean Gonzáles M.D.

## 2017-04-07 NOTE — LETTER
4/20/2017 4:03 PM 
 
Ms. Michelle Farley [address] [facility] [address] Dear Michelle Farley: 
 
Please find your most recent results below. Resulted Orders HEMOGLOBIN A1C WITH EAG Result Value Ref Range Hemoglobin A1c 5.8 (H) 4.8 - 5.6 % Comment:  
            Pre-diabetes: 5.7 - 6.4 Diabetes: >6.4 Glycemic control for adults with diabetes: <7.0 Estimated average glucose 120 mg/dL Narrative Performed at:  [address]  [id number] : Will Maloney MD, Phone:  [phone] PROTHROMBIN TIME + INR Result Value Ref Range INR 1.0 0.8 - 1.2 Comment:  
   Reference interval is for non-anticoagulated patients. Suggested INR therapeutic range for Vitamin K 
antagonist therapy: 
   Standard Dose (moderate intensity 
                  therapeutic range):       2.0 - 3.0 Higher intensity therapeutic range       2.5 - 3.5 Prothrombin time 10.2 9.1 - 12.0 sec Narrative Performed at:  [address]  [id number] : Will Maloney MD, Phone:  [phone] RECOMMENDATIONS: 
We have attempted to contact you by phone to inform you of these results and were unsuccessful so we are sending you this letter of notification. A1c under 6.5 therefore currently at goal; continue medications as directed. Please call me if you have any questions: [phone] Sincerely, Lyn Rubinstein, MD

## 2017-04-07 NOTE — PATIENT INSTRUCTIONS
Constipation: Care Instructions  Your Care Instructions  Constipation means that you have a hard time passing stools (bowel movements). People pass stools from 3 times a day to once every 3 days. What is normal for you may be different. Constipation may occur with pain in the rectum and cramping. The pain may get worse when you try to pass stools. Sometimes there are small amounts of bright red blood on toilet paper or the surface of stools. This is because of enlarged veins near the rectum (hemorrhoids). A few changes in your diet and lifestyle may help you avoid ongoing constipation. Your doctor may also prescribe medicine to help loosen your stool. Some medicines can cause constipation. These include pain medicines and antidepressants. Tell your doctor about all the medicines you take. Your doctor may want to make a medicine change to ease your symptoms. Follow-up care is a key part of your treatment and safety. Be sure to make and go to all appointments, and call your doctor if you are having problems. It's also a good idea to know your test results and keep a list of the medicines you take. How can you care for yourself at home? · Drink plenty of fluids, enough so that your urine is light yellow or clear like water. If you have kidney, heart, or liver disease and have to limit fluids, talk with your doctor before you increase the amount of fluids you drink. · Include high-fiber foods in your diet each day. These include fruits, vegetables, beans, and whole grains. · Get at least 30 minutes of exercise on most days of the week. Walking is a good choice. You also may want to do other activities, such as running, swimming, cycling, or playing tennis or team sports. · Take a fiber supplement, such as Citrucel or Metamucil, every day. Read and follow all instructions on the label. · Schedule time each day for a bowel movement. A daily routine may help.  Take your time having your bowel movement. · Support your feet with a small step stool when you sit on the toilet. This helps flex your hips and places your pelvis in a squatting position. · Your doctor may recommend an over-the-counter laxative to relieve your constipation. Examples are Milk of Magnesia and MiraLax. Read and follow all instructions on the label. Do not use laxatives on a long-term basis. When should you call for help? Call your doctor now or seek immediate medical care if:  · You have new or worse belly pain. · You have new or worse nausea or vomiting. · You have blood in your stools. Watch closely for changes in your health, and be sure to contact your doctor if:  · Your constipation is getting worse. · You do not get better as expected. Where can you learn more? Go to http://marissa-marjan.info/. Enter 21 [phone] in the search box to learn more about \"Constipation: Care Instructions. \"  Current as of: May 27, 2016  Content Version: 11.2  © 9451-4795 [employer]. Care instructions adapted under license by [employer] (which disclaims liability or warranty for this information). If you have questions about a medical condition or this instruction, always ask your healthcare professional. [place] [address] any warranty or liability for your use of this information.

## 2017-04-07 NOTE — Clinical Note
Please print and fax over this office note with her pre op forms along with recent EKG and her labs within the past 30 days.

## 2017-04-07 NOTE — PROGRESS NOTES
Patient presents in office today for 2 month follow up for DM and pre-op exam  Patient would like increased dosage of linzess-states it is not working well      Chief Complaint   Patient presents with    Diabetes     2 month follow up with pcp    Pre-op Exam     surgery date 4/26/2017 with Dr. Edward Dixon     1. Have you been to the ER, urgent care clinic since your last visit? Hospitalized since your last visit? No    2. Have you seen or consulted any other health care providers outside of the [address] [place] [address] since your last visit? Include any pap smears or colon screening.  No     Vitals:    04/07/17 1102   BP: 120/78   BP 1 Location: Left arm   BP Patient Position: Sitting   Pulse: 70   Resp: 18   Temp: 98.1 °F (36.7 °C)   TempSrc: Oral   SpO2: 98%   Weight: 244 lb 4 oz (110.8 kg)   Height: 5' 5\" (1.651 m)

## 2017-04-07 NOTE — Clinical Note
Hi Dr. Haley Lyn,  We haven't officially met yet but thank you so much for seeing several of my patients at your weight loss clinic; I'm always happy to hear any feedback that you may have. I saw Amandeep Dao today whom you have seen in your clinic. She stated to me today that she was supposed to still take the phentermine but she wanted me to prescribe it. Based on your last note, the Topamax you prescribed for her was intended for weight loss; I was wondering if the Topamax is supposed to replace the phentermine? I was not planning on continuing the phentermine myself since she has been taking it sporadically throughout the past year without weight loss success but I wanted to reach out to you to see what your thoughts were. Thanks very much and looking forward to working with you!   Keesha Sinclair M.D.

## 2017-04-08 LAB
EST. AVERAGE GLUCOSE BLD GHB EST-MCNC: 120 MG/DL
HBA1C MFR BLD: 5.8 % (ref 4.8–5.6)
INR PPP: 1 (ref 0.8–1.2)
PROTHROMBIN TIME: 10.2 SEC (ref 9.1–12)

## 2017-04-10 NOTE — PROGRESS NOTES
Please notify patient regarding their test results:    A1c under 6.5 therefore currently at goal; continue medications as directed. Will send preop H&P and labs to spinal surgeon (see OV note).

## 2017-04-12 NOTE — PERIOP NOTES
PRE-OP INSTRUCTIONS GIVEN OVER TELEPHONE , WHICH INCLUDED INSTRUCTIONS ON MEDICATIONS TO TAKE DAY OF SURGERY AND MEDICATIONS TO STOP PRIOR TO SURGERY. WRITTEN AND VERBAL INSTRUCTIONS ON NEW DIET PROTOCOL INCLUDED IN MAILING PACKET AS WELL AS VERBAL AND WRITTEN INSTRUCTIONS GIVEN ON USE OF DIAL SOAP  PRIOR TO SURGERY AND USE OF CHLORHEXIDINE WIPES THE EVENING BEFORE SURGERY AND THE MORNING OF SURGERY. PATIENT'S ADDRESS CONFIRMED IN ORDER TO MAIL CHLORHEXIDINE WIPES AND WRITTEN INSTRUCTIONS FOR THEIR USE. PATIENT VOICED UNDERSTANDING OF ABOVE.

## 2017-04-18 ENCOUNTER — OFFICE VISIT (OUTPATIENT)
Dept: FAMILY MEDICINE CLINIC | Age: 42
End: 2017-04-18

## 2017-04-18 VITALS
SYSTOLIC BLOOD PRESSURE: 114 MMHG | BODY MASS INDEX: 40.32 KG/M2 | TEMPERATURE: 98.6 F | HEART RATE: 90 BPM | DIASTOLIC BLOOD PRESSURE: 72 MMHG | RESPIRATION RATE: 16 BRPM | WEIGHT: 242 LBS | OXYGEN SATURATION: 98 % | HEIGHT: 65 IN

## 2017-04-18 DIAGNOSIS — M54.2 CHRONIC NECK PAIN: ICD-10-CM

## 2017-04-18 DIAGNOSIS — M50.20 HERNIATED DISC, CERVICAL: Primary | ICD-10-CM

## 2017-04-18 DIAGNOSIS — Z88.9 H/O SEASONAL ALLERGIES: ICD-10-CM

## 2017-04-18 DIAGNOSIS — G89.29 CHRONIC NECK PAIN: ICD-10-CM

## 2017-04-18 RX ORDER — PREDNISONE 20 MG/1
TABLET ORAL
Refills: 0 | Status: ON HOLD | COMMUNITY
Start: 2017-04-03 | End: 2017-05-01 | Stop reason: CLARIF

## 2017-04-18 RX ORDER — LORATADINE 10 MG/1
10 TABLET ORAL
Qty: 30 TAB | Refills: 6 | Status: ON HOLD | OUTPATIENT
Start: 2017-04-18 | End: 2017-11-07

## 2017-04-18 RX ORDER — OXYCODONE HCL 10 MG/1
10 TABLET, FILM COATED, EXTENDED RELEASE ORAL DAILY
Qty: 7 TAB | Refills: 0 | Status: SHIPPED | OUTPATIENT
Start: 2017-04-18 | End: 2017-05-02

## 2017-04-18 NOTE — PROGRESS NOTES
Chief Complaint   Patient presents with    Pre-op Exam     04/26/27, [facility], Anterior cervical fusion C4-5    Medication Evaluation     Flexeril

## 2017-04-18 NOTE — PROGRESS NOTES
Chief Complaint   Patient presents with    Pre-op Exam     04/26/27, [facility], Anterior cervical fusion C4-5    Medication Evaluation     Flexeril     she is a 46y.o. year old female who presents for evalution. She has surgery on cspine scheduled for next week. She has tingling in the hingers and toes mostly on the left side. She did get the sleep study,  And she has \" mild SONY\" she was told she need to pay 70 dollars a month for the cpap rental.  She was down to 235 but went back up after taking prednisone for the neck pain  She is taking percocet now also        Reviewed PmHx, RxHx, FmHx, SocHx, AllgHx and updated and dated in the chart.     Patient Active Problem List    Diagnosis    Chronic neck pain    SONY (obstructive sleep apnea)    DDD (degenerative disc disease), cervical     followed by ortho      Arthritis     lower back      Asthma     last attack 1994      B[facility] 1 disorder [facility]    GERD (gastroesophageal reflux disease)    High cholesterol    HTN, goal below 140/90    Migraines    Morbid obesity ([facility]    Unspecified sleep apnea     no cpap-NEVER TESTED      Diabetes mellitus type 2, controlled ([facility]     a1c 7.4% at outside EMR         Nurse notes were reviewed and copied and are correct  Review of Systems - negative except as listed above in the HPI    Objective:     Vitals:    04/18/17 1300   BP: 114/72   Pulse: 90   Resp: 16   Temp: 98.6 °F (37 °C)   TempSrc: Oral   SpO2: 98%   Weight: 242 lb (109.8 kg)   Height: 5' 5\" (1.651 m)       Physical Examination: General appearance - alert, well appearing, and in no distress and oriented to person, place, and time  Mental status - alert, oriented to person, place, and time, normal mood, behavior, speech, dress, motor activity, and thought processes  Neck - supple, no significant adenopathy  Lymphatics - no palpable lymphadenopathy, no hepatosplenomegaly  Chest - clear to auscultation, no wheezes, rales or rhonchi, symmetric air entry  Heart - normal rate, regular rhythm, normal S1, S2, no murmurs, rubs, clicks or gallops  Abdomen - soft, nontender, nondistended, no masses or organomegaly      Assessment/ Plan:   Sasha Best was seen today for pre-op exam and medication evaluation. Diagnoses and all orders for this visit:    Herniated disc, cervical  -     oxyCODONE ER (OXYCONTIN) 10 mg ER tablet; Take 1 Tab by mouth daily. Max Daily Amount: 10 mg. This is a short term RX only. Chronic neck pain  -     oxyCODONE ER (OXYCONTIN) 10 mg ER tablet; Take 1 Tab by mouth daily. Max Daily Amount: 10 mg.    H/O seasonal allergies  -     loratadine (CLARITIN) 10 mg tablet; Take 1 Tab by mouth nightly. Follow-up Disposition: Not on File    ICD-10-CM ICD-9-CM    1. Herniated disc, cervical M50.20 722.0 oxyCODONE ER (OXYCONTIN) 10 mg ER tablet   2. Chronic neck pain M54.2 723.1 oxyCODONE ER (OXYCONTIN) 10 mg ER tablet    G89.29 338.29    3. H/O seasonal allergies Z88.9 V15.09 loratadine (CLARITIN) 10 mg tablet       I have discussed the diagnosis with the patient and the intended plan as seen in the above orders. The patient has received an after-visit summary and questions were answered concerning future plans. Medication Side Effects and Warnings were discussed with patient: yes  Patient Labs were reviewed and or requested: yes  Patient Past Records were reviewed and or requested: yes        There are no Patient Instructions on file for this visit.     The patient verbalizes understanding and agrees with the plan of care        Patient has the advanced directives booklet to review

## 2017-04-18 NOTE — MR AVS SNAPSHOT
Visit Information Date & Time Provider Department Dept. Phone Encounter #  
 4/18/2017  1:00 PM Flora Sanchez MD [facility] [id number] Your Appointments 5/30/2017 10:20 AM  
Any with Alena Hogan MD  
[address] [facility]) Appt Note: 1st adherence, bring machine [facility] [address] [place] [address] [address]  
  
   
 [address] [place] [id number] 7/10/2017  9:45 AM  
ROUTINE CARE with Babar Darby MD  
[facility]) Appt Note: 3 months f/u appt  
 [address] [address]  
[phone]  
  
   
 [address] [address] Upcoming Health Maintenance Date Due  
 EYE EXAM RETINAL OR DILATED Q1 9/27/1987 Pneumococcal 19-64 Medium Risk (1 of 1 - PPSV23) 9/27/1996 PAP AKA CERVICAL CYTOLOGY 9/27/1998 MICROALBUMIN Q1 2/26/2017 FOOT EXAM Q1 5/26/2017 HEMOGLOBIN A1C Q6M 10/7/2017 LIPID PANEL Q1 11/11/2017 DTaP/Tdap/Td series (2 - Td) 3/10/2024 Allergies as of 4/18/2017  Review Complete On: 4/18/2017 By: Flora Sanchez MD  
  
 Severity Noted Reaction Type Reactions Pcn [Penicillins] High 05/03/2010    Swelling Swelling throat Shellfish Containing Products High 03/06/2011    Swelling Swells throat and eyes Current Immunizations  Reviewed on 5/26/2016 Name Date Tdap 3/10/2014  5:21 PM  
  
 Not reviewed this visit You Were Diagnosed With   
  
 Codes Comments Herniated disc, cervical    -  Primary ICD-10-CM: M50.20 ICD-9-CM: 722.0 Chronic neck pain     ICD-10-CM: M54.2, G89.29 ICD-9-CM: 723.1, 338.29   
 H/O seasonal allergies     ICD-10-CM: Z88.9 ICD-9-CM: V15.09 Vitals BP Pulse Temp Resp Height(growth percentile) Weight(growth percentile) 114/72 (BP 1 Location: Left arm, BP Patient Position: Sitting) 90 98.6 °F (37 °C) (Oral) 16 5' 5\" (1.651 m) 242 lb (109.8 kg) SpO2 BMI OB Status Smoking Status 98% 40.27 kg/m2 Hysterectomy Never Smoker Vitals History BMI and BSA Data Body Mass Index Body Surface Area  
 40.27 kg/m 2 2.24 m 2 Preferred Pharmacy Pharmacy Name Phone [id number]- [place], [place] - [address] [phone] Your Updated Medication List  
  
   
This list is accurate as of: 4/18/17  1:53 PM.  Always use your most recent med list.  
  
  
  
  
 aspirin delayed-release 81 mg tablet Take 81 mg by mouth daily. atenolol 25 mg tablet Commonly known as:  TENORMIN  
take 1 tablet by mouth once daily BD LUER-MARIELY SYRINGE 3 mL 25 gauge x 1\" Syrg Generic drug:  Syringe with Needle (Disp) USE TO INJECT 1 ML EVERY 2 WEEKS  
  
 cyanocobalamin 1,000 mcg/mL injection Commonly known as:  VITAMIN B12  
EVERY OTHER WEEK  
  
 cyclobenzaprine 10 mg tablet Commonly known as:  FLEXERIL  
take 1 tablet by mouth three times a day if needed for muscle spasm FISH OIL PO Take  by mouth daily. furosemide 40 mg tablet Commonly known as:  LASIX  
take 1 tablet by mouth once daily  
  
 glimepiride 2 mg tablet Commonly known as:  AMARYL Take 1 Tab by mouth two (2) times a day. glucose blood VI test strips strip Commonly known as:  Ascensia CONTOUR  
E11.69 Test blood sugar 3 times a day HAIR,SKIN AND NAILS PO Take  by mouth daily. insulin glargine 100 unit/mL (3 mL) pen Commonly known as:  LANTUS SOLOSTAR INJECT 30 UNITS DAILY Lancets Misc E11.69  
  
 linaclotide 145 mcg Cap capsule Commonly known as:  Charmayne Guile Take 1 Cap by mouth Daily (before breakfast). loratadine 10 mg tablet Commonly known as:  Geovanni Salter Take 1 Tab by mouth nightly. metFORMIN  mg tablet Commonly known as:  GLUCOPHAGE XR  
take 1 tablet by mouth once daily  
  
 montelukast 10 mg tablet Commonly known as:  SINGULAIR  
take 1 tablet by mouth once daily MULTIVITAMIN PO Take  by mouth daily. NYSTOP powder Generic drug:  nystatin  
two (2) times a day. oxyCODONE ER 10 mg ER tablet Commonly known as:  OxyCONTIN Take 1 Tab by mouth daily. Max Daily Amount: 10 mg.  
  
 phentermine 37.5 mg tablet Commonly known as:  ADIPEX-P Take 1 Tab by mouth every morning. Max Daily Amount: 37.5 mg.  
  
 polyethylene glycol 17 gram packet Commonly known as:  Romaine Rast Take 1 Packet by mouth daily. potassium chloride 20 mEq tablet Commonly known as:  K-DUR, KLOR-CON  
take 1 tablet by mouth once daily  
  
 pravastatin 40 mg tablet Commonly known as:  PRAVACHOL Take 1 Tab by mouth nightly. CHANGE IN THERAPY predniSONE 20 mg tablet Commonly known as:  DELTASONE  
TAKE 3 TABS DAILY FOR 3 DAYS,2 TABS FOR 3 DAYS, THEN 1 TAB FOR 3 DAYS  
  
 topiramate 25 mg tablet Commonly known as:  TOPAMAX Take 1 Tab by mouth two (2) times daily (with meals). traZODone 100 mg tablet Commonly known as:  DESYREL  
take 1 tablet by mouth at bedtime VITAMIN D3 2,000 unit Cap capsule Generic drug:  Cholecalciferol (Vitamin D3)  
take 1 capsule by mouth once daily VITAMIN E PO Take  by mouth daily. Prescriptions Printed Refills  
 oxyCODONE ER (OXYCONTIN) 10 mg ER tablet 0 Sig: Take 1 Tab by mouth daily. Max Daily Amount: 10 mg.  
 Class: Print Route: Oral  
  
Prescriptions Sent to Pharmacy Refills  
 loratadine (CLARITIN) 10 mg tablet 6 Sig: Take 1 Tab by mouth nightly. Class: Normal  
 Pharmacy: [facility]/pharmacy [address] [place] [address] [address] Ph #: [phone] Route: Oral  
  
Introducing [facility]!    
 [facility] introduces [facility] patient portal. Now you can access parts of your medical record, email your doctor's office, and request medication refills online. 1. In your internet browser, go to [email or web address] 2. Click on the First Time User? Click Here link in the Sign In box. You will see the New Member Sign Up page. 3. Enter your Banksnob Access Code exactly as it appears below. You will not need to use this code after youve completed the sign-up process. If you do not sign up before the expiration date, you must request a new code. · Banksnob Access Code: [email or web address] Expires: 5/21/2017 12:44 PM 
 
4. Enter the last four digits of your Social Security Number (xxxx) and Date of Birth (mm/dd/yyyy) as indicated and click Submit. You will be taken to the next sign-up page. 5. Create a Banksnob ID. This will be your Banksnob login ID and cannot be changed, so think of one that is secure and easy to remember. 6. Create a Banksnob password. You can change your password at any time. 7. Enter your Password Reset Question and Answer. This can be used at a later time if you forget your password. 8. Enter your e-mail address. You will receive e-mail notification when new information is available in [address] 9. Click Sign Up. You can now view and download portions of your medical record. 10. Click the Download Summary menu link to download a portable copy of your medical information. If you have questions, please visit the Frequently Asked Questions section of the Banksnob website. Remember, Banksnob is NOT to be used for urgent needs. For medical emergencies, dial 911. Now available from your iPhone and Android! Please provide this summary of care documentation to your next provider. Your primary care clinician is listed as Marion Byrd. If you have any questions after today's visit, please call [phone].

## 2017-04-29 ENCOUNTER — ANESTHESIA EVENT (OUTPATIENT)
Dept: SURGERY | Age: 42
DRG: 473 | End: 2017-04-29
Payer: MEDICARE

## 2017-05-01 ENCOUNTER — ANESTHESIA (OUTPATIENT)
Dept: SURGERY | Age: 42
DRG: 473 | End: 2017-05-01
Payer: MEDICARE

## 2017-05-01 ENCOUNTER — APPOINTMENT (OUTPATIENT)
Dept: GENERAL RADIOLOGY | Age: 42
DRG: 473 | End: 2017-05-01
Attending: ORTHOPAEDIC SURGERY
Payer: MEDICARE

## 2017-05-01 ENCOUNTER — HOSPITAL ENCOUNTER (INPATIENT)
Age: 42
LOS: 1 days | Discharge: HOME | DRG: 473 | End: 2017-05-02
Attending: ORTHOPAEDIC SURGERY | Admitting: ORTHOPAEDIC SURGERY
Payer: MEDICARE

## 2017-05-01 PROBLEM — M48.02 CERVICAL STENOSIS OF SPINE: Status: ACTIVE | Noted: 2017-05-01

## 2017-05-01 LAB
ABO + RH BLD: NORMAL
BLOOD GROUP ANTIBODIES SERPL: NORMAL
GLUCOSE BLD STRIP.AUTO-MCNC: 179 MG/DL (ref 65–100)
GLUCOSE BLD STRIP.AUTO-MCNC: 80 MG/DL (ref 65–100)
GLUCOSE BLD STRIP.AUTO-MCNC: 92 MG/DL (ref 65–100)
SERVICE CMNT-IMP: ABNORMAL
SERVICE CMNT-IMP: NORMAL
SERVICE CMNT-IMP: NORMAL
SPECIMEN EXP DATE BLD: NORMAL

## 2017-05-01 PROCEDURE — 77030018846 HC SOL IRR STRL H20 ICUM -A: Performed by: ORTHOPAEDIC SURGERY

## 2017-05-01 PROCEDURE — 77030011267 HC ELECTRD BLD COVD -A: Performed by: ORTHOPAEDIC SURGERY

## 2017-05-01 PROCEDURE — 65270000029 HC RM PRIVATE

## 2017-05-01 PROCEDURE — 77030032490 HC SLV COMPR SCD KNE COVD -B: Performed by: ORTHOPAEDIC SURGERY

## 2017-05-01 PROCEDURE — 77030013567 HC DRN WND RESERV BARD -A: Performed by: ORTHOPAEDIC SURGERY

## 2017-05-01 PROCEDURE — C1713 ANCHOR/SCREW BN/BN,TIS/BN: HCPCS | Performed by: ORTHOPAEDIC SURGERY

## 2017-05-01 PROCEDURE — 74011250637 HC RX REV CODE- 250/637: Performed by: PHYSICIAN ASSISTANT

## 2017-05-01 PROCEDURE — 77030012464: Performed by: ORTHOPAEDIC SURGERY

## 2017-05-01 PROCEDURE — 74011250636 HC RX REV CODE- 250/636

## 2017-05-01 PROCEDURE — 74011000250 HC RX REV CODE- 250: Performed by: ANESTHESIOLOGY

## 2017-05-01 PROCEDURE — 77030003666 HC NDL SPINAL BD -A: Performed by: ORTHOPAEDIC SURGERY

## 2017-05-01 PROCEDURE — 77030031139 HC SUT VCRL2 J&J -A: Performed by: ORTHOPAEDIC SURGERY

## 2017-05-01 PROCEDURE — 0RG10K0 FUSION OF CERVICAL VERTEBRAL JOINT WITH NONAUTOLOGOUS TISSUE SUBSTITUTE, ANTERIOR APPROACH, ANTERIOR COLUMN, OPEN APPROACH: ICD-10-PCS | Performed by: ORTHOPAEDIC SURGERY

## 2017-05-01 PROCEDURE — 74011250636 HC RX REV CODE- 250/636: Performed by: ANESTHESIOLOGY

## 2017-05-01 PROCEDURE — 74011250636 HC RX REV CODE- 250/636: Performed by: ORTHOPAEDIC SURGERY

## 2017-05-01 PROCEDURE — 77030004391 HC BUR FLUT MEDT -C: Performed by: ORTHOPAEDIC SURGERY

## 2017-05-01 PROCEDURE — 77030008684 HC TU ET CUF COVD -B: Performed by: ANESTHESIOLOGY

## 2017-05-01 PROCEDURE — 77030018859 HC TBNG IRR BPLR MALS J&J -B: Performed by: ORTHOPAEDIC SURGERY

## 2017-05-01 PROCEDURE — 76060000033 HC ANESTHESIA 1 TO 1.5 HR: Performed by: ORTHOPAEDIC SURGERY

## 2017-05-01 PROCEDURE — 76010000161 HC OR TIME 1 TO 1.5 HR INTENSV-TIER 1: Performed by: ORTHOPAEDIC SURGERY

## 2017-05-01 PROCEDURE — 77030026438 HC STYL ET INTUB CARD -A: Performed by: ANESTHESIOLOGY

## 2017-05-01 PROCEDURE — 77030022965 HC BIT DRL W/STP GLBM -B: Performed by: ORTHOPAEDIC SURGERY

## 2017-05-01 PROCEDURE — 77030018836 HC SOL IRR NACL ICUM -A: Performed by: ORTHOPAEDIC SURGERY

## 2017-05-01 PROCEDURE — 0RB30ZZ EXCISION OF CERVICAL VERTEBRAL DISC, OPEN APPROACH: ICD-10-PCS | Performed by: ORTHOPAEDIC SURGERY

## 2017-05-01 PROCEDURE — 36415 COLL VENOUS BLD VENIPUNCTURE: CPT | Performed by: ORTHOPAEDIC SURGERY

## 2017-05-01 PROCEDURE — 77030029099 HC BN WAX SSPC -A: Performed by: ORTHOPAEDIC SURGERY

## 2017-05-01 PROCEDURE — 82962 GLUCOSE BLOOD TEST: CPT

## 2017-05-01 PROCEDURE — 86900 BLOOD TYPING SEROLOGIC ABO: CPT | Performed by: ORTHOPAEDIC SURGERY

## 2017-05-01 PROCEDURE — 72020 X-RAY EXAM OF SPINE 1 VIEW: CPT

## 2017-05-01 PROCEDURE — 77030012893

## 2017-05-01 PROCEDURE — 76210000016 HC OR PH I REC 1 TO 1.5 HR: Performed by: ORTHOPAEDIC SURGERY

## 2017-05-01 PROCEDURE — 51798 US URINE CAPACITY MEASURE: CPT

## 2017-05-01 PROCEDURE — 77030012406 HC DRN WND PENRS BARD -A: Performed by: ORTHOPAEDIC SURGERY

## 2017-05-01 PROCEDURE — 74011000250 HC RX REV CODE- 250

## 2017-05-01 PROCEDURE — 74011250636 HC RX REV CODE- 250/636: Performed by: PHYSICIAN ASSISTANT

## 2017-05-01 DEVICE — SET SCREW FOR RIGID SCREWS
Type: IMPLANTABLE DEVICE | Site: SPINE CERVICAL | Status: FUNCTIONAL
Brand: ASSURE

## 2017-05-01 DEVICE — 4.0MM RIGID SCREW, SELF-TAPPING, 12MM
Type: IMPLANTABLE DEVICE | Site: SPINE CERVICAL | Status: FUNCTIONAL
Brand: ASSURE

## 2017-05-01 DEVICE — BONE SPCR CERV LORDC 5X7MM --: Type: IMPLANTABLE DEVICE | Site: SPINE CERVICAL | Status: FUNCTIONAL

## 2017-05-01 DEVICE — ASSURE CERVICAL PLATE 1-LEVEL, 14MM
Type: IMPLANTABLE DEVICE | Site: SPINE CERVICAL | Status: FUNCTIONAL
Brand: ASSURE

## 2017-05-01 RX ORDER — OXYCODONE HYDROCHLORIDE 5 MG/1
5 TABLET ORAL
Status: DISCONTINUED | OUTPATIENT
Start: 2017-05-01 | End: 2017-05-02 | Stop reason: HOSPADM

## 2017-05-01 RX ORDER — FENTANYL CITRATE 50 UG/ML
25 INJECTION, SOLUTION INTRAMUSCULAR; INTRAVENOUS
Status: DISCONTINUED | OUTPATIENT
Start: 2017-05-01 | End: 2017-05-01 | Stop reason: HOSPADM

## 2017-05-01 RX ORDER — NEOSTIGMINE METHYLSULFATE 1 MG/ML
INJECTION INTRAVENOUS AS NEEDED
Status: DISCONTINUED | OUTPATIENT
Start: 2017-05-01 | End: 2017-05-01 | Stop reason: HOSPADM

## 2017-05-01 RX ORDER — MELATONIN
2000 DAILY
Status: DISCONTINUED | OUTPATIENT
Start: 2017-05-02 | End: 2017-05-02 | Stop reason: HOSPADM

## 2017-05-01 RX ORDER — LIDOCAINE HYDROCHLORIDE 20 MG/ML
INJECTION, SOLUTION EPIDURAL; INFILTRATION; INTRACAUDAL; PERINEURAL AS NEEDED
Status: DISCONTINUED | OUTPATIENT
Start: 2017-05-01 | End: 2017-05-01 | Stop reason: HOSPADM

## 2017-05-01 RX ORDER — LORATADINE 10 MG/1
10 TABLET ORAL
Status: DISCONTINUED | OUTPATIENT
Start: 2017-05-01 | End: 2017-05-02 | Stop reason: HOSPADM

## 2017-05-01 RX ORDER — VANCOMYCIN 2 GRAM/500 ML IN 0.9 % SODIUM CHLORIDE INTRAVENOUS
2000 EVERY 12 HOURS
Status: COMPLETED | OUTPATIENT
Start: 2017-05-02 | End: 2017-05-02

## 2017-05-01 RX ORDER — GLYCOPYRROLATE 0.2 MG/ML
0.2 INJECTION INTRAMUSCULAR; INTRAVENOUS
Status: DISCONTINUED | OUTPATIENT
Start: 2017-05-01 | End: 2017-05-01 | Stop reason: HOSPADM

## 2017-05-01 RX ORDER — HYDROMORPHONE HYDROCHLORIDE 1 MG/ML
0.2 INJECTION, SOLUTION INTRAMUSCULAR; INTRAVENOUS; SUBCUTANEOUS
Status: DISCONTINUED | OUTPATIENT
Start: 2017-05-01 | End: 2017-05-01 | Stop reason: HOSPADM

## 2017-05-01 RX ORDER — FENTANYL CITRATE 50 UG/ML
INJECTION, SOLUTION INTRAMUSCULAR; INTRAVENOUS AS NEEDED
Status: DISCONTINUED | OUTPATIENT
Start: 2017-05-01 | End: 2017-05-01 | Stop reason: HOSPADM

## 2017-05-01 RX ORDER — GLIMEPIRIDE 2 MG/1
4 TABLET ORAL
Status: DISCONTINUED | OUTPATIENT
Start: 2017-05-02 | End: 2017-05-02 | Stop reason: HOSPADM

## 2017-05-01 RX ORDER — ONDANSETRON 2 MG/ML
4 INJECTION INTRAMUSCULAR; INTRAVENOUS
Status: DISCONTINUED | OUTPATIENT
Start: 2017-05-01 | End: 2017-05-02 | Stop reason: HOSPADM

## 2017-05-01 RX ORDER — MIDAZOLAM HYDROCHLORIDE 1 MG/ML
0.5 INJECTION, SOLUTION INTRAMUSCULAR; INTRAVENOUS
Status: DISCONTINUED | OUTPATIENT
Start: 2017-05-01 | End: 2017-05-01 | Stop reason: HOSPADM

## 2017-05-01 RX ORDER — TRAZODONE HYDROCHLORIDE 100 MG/1
100 TABLET ORAL
Status: DISCONTINUED | OUTPATIENT
Start: 2017-05-02 | End: 2017-05-02 | Stop reason: HOSPADM

## 2017-05-01 RX ORDER — MIDAZOLAM HYDROCHLORIDE 1 MG/ML
1 INJECTION, SOLUTION INTRAMUSCULAR; INTRAVENOUS AS NEEDED
Status: DISCONTINUED | OUTPATIENT
Start: 2017-05-01 | End: 2017-05-01 | Stop reason: HOSPADM

## 2017-05-01 RX ORDER — MIDAZOLAM HYDROCHLORIDE 1 MG/ML
INJECTION, SOLUTION INTRAMUSCULAR; INTRAVENOUS AS NEEDED
Status: DISCONTINUED | OUTPATIENT
Start: 2017-05-01 | End: 2017-05-01 | Stop reason: HOSPADM

## 2017-05-01 RX ORDER — MAGNESIUM SULFATE 100 %
4 CRYSTALS MISCELLANEOUS AS NEEDED
Status: DISCONTINUED | OUTPATIENT
Start: 2017-05-01 | End: 2017-05-02 | Stop reason: HOSPADM

## 2017-05-01 RX ORDER — SODIUM CHLORIDE 0.9 % (FLUSH) 0.9 %
5-10 SYRINGE (ML) INJECTION AS NEEDED
Status: DISCONTINUED | OUTPATIENT
Start: 2017-05-01 | End: 2017-05-02 | Stop reason: HOSPADM

## 2017-05-01 RX ORDER — DIPHENHYDRAMINE HYDROCHLORIDE 50 MG/ML
12.5 INJECTION, SOLUTION INTRAMUSCULAR; INTRAVENOUS AS NEEDED
Status: DISCONTINUED | OUTPATIENT
Start: 2017-05-01 | End: 2017-05-01 | Stop reason: HOSPADM

## 2017-05-01 RX ORDER — ACETAMINOPHEN 325 MG/1
650 TABLET ORAL
Status: DISCONTINUED | OUTPATIENT
Start: 2017-05-01 | End: 2017-05-02 | Stop reason: HOSPADM

## 2017-05-01 RX ORDER — PRAVASTATIN SODIUM 40 MG/1
40 TABLET ORAL
Status: DISCONTINUED | OUTPATIENT
Start: 2017-05-01 | End: 2017-05-02 | Stop reason: HOSPADM

## 2017-05-01 RX ORDER — ONDANSETRON 2 MG/ML
INJECTION INTRAMUSCULAR; INTRAVENOUS AS NEEDED
Status: DISCONTINUED | OUTPATIENT
Start: 2017-05-01 | End: 2017-05-01 | Stop reason: HOSPADM

## 2017-05-01 RX ORDER — ROCURONIUM BROMIDE 10 MG/ML
INJECTION, SOLUTION INTRAVENOUS AS NEEDED
Status: DISCONTINUED | OUTPATIENT
Start: 2017-05-01 | End: 2017-05-01 | Stop reason: HOSPADM

## 2017-05-01 RX ORDER — PROPOFOL 10 MG/ML
INJECTION, EMULSION INTRAVENOUS AS NEEDED
Status: DISCONTINUED | OUTPATIENT
Start: 2017-05-01 | End: 2017-05-01 | Stop reason: HOSPADM

## 2017-05-01 RX ORDER — HYDROCODONE BITARTRATE AND ACETAMINOPHEN 5; 325 MG/1; MG/1
1 TABLET ORAL AS NEEDED
Status: DISCONTINUED | OUTPATIENT
Start: 2017-05-01 | End: 2017-05-01 | Stop reason: HOSPADM

## 2017-05-01 RX ORDER — POTASSIUM CHLORIDE 750 MG/1
20 TABLET, FILM COATED, EXTENDED RELEASE ORAL DAILY
Status: DISCONTINUED | OUTPATIENT
Start: 2017-05-02 | End: 2017-05-02 | Stop reason: HOSPADM

## 2017-05-01 RX ORDER — METFORMIN HYDROCHLORIDE 500 MG/1
1000 TABLET, EXTENDED RELEASE ORAL
Status: DISCONTINUED | OUTPATIENT
Start: 2017-05-02 | End: 2017-05-02 | Stop reason: HOSPADM

## 2017-05-01 RX ORDER — SODIUM CHLORIDE 0.9 % (FLUSH) 0.9 %
5-10 SYRINGE (ML) INJECTION EVERY 8 HOURS
Status: DISCONTINUED | OUTPATIENT
Start: 2017-05-01 | End: 2017-05-02 | Stop reason: HOSPADM

## 2017-05-01 RX ORDER — HYDROMORPHONE HYDROCHLORIDE 1 MG/ML
INJECTION, SOLUTION INTRAMUSCULAR; INTRAVENOUS; SUBCUTANEOUS AS NEEDED
Status: DISCONTINUED | OUTPATIENT
Start: 2017-05-01 | End: 2017-05-01 | Stop reason: HOSPADM

## 2017-05-01 RX ORDER — DEXTROSE 50 % IN WATER (D50W) INTRAVENOUS SYRINGE
12.5-25 AS NEEDED
Status: DISCONTINUED | OUTPATIENT
Start: 2017-05-01 | End: 2017-05-02 | Stop reason: HOSPADM

## 2017-05-01 RX ORDER — KETAMINE HYDROCHLORIDE 10 MG/ML
INJECTION, SOLUTION INTRAMUSCULAR; INTRAVENOUS AS NEEDED
Status: DISCONTINUED | OUTPATIENT
Start: 2017-05-01 | End: 2017-05-01 | Stop reason: HOSPADM

## 2017-05-01 RX ORDER — POLYETHYLENE GLYCOL 3350 17 G/17G
17 POWDER, FOR SOLUTION ORAL DAILY
Status: DISCONTINUED | OUTPATIENT
Start: 2017-05-02 | End: 2017-05-02 | Stop reason: HOSPADM

## 2017-05-01 RX ORDER — NALOXONE HYDROCHLORIDE 0.4 MG/ML
0.4 INJECTION, SOLUTION INTRAMUSCULAR; INTRAVENOUS; SUBCUTANEOUS AS NEEDED
Status: DISCONTINUED | OUTPATIENT
Start: 2017-05-01 | End: 2017-05-02 | Stop reason: HOSPADM

## 2017-05-01 RX ORDER — MORPHINE SULFATE 5 MG/ML
INJECTION, SOLUTION INTRAVENOUS
Status: DISCONTINUED | OUTPATIENT
Start: 2017-05-01 | End: 2017-05-02

## 2017-05-01 RX ORDER — MORPHINE SULFATE 10 MG/ML
2 INJECTION, SOLUTION INTRAMUSCULAR; INTRAVENOUS
Status: DISCONTINUED | OUTPATIENT
Start: 2017-05-01 | End: 2017-05-01 | Stop reason: HOSPADM

## 2017-05-01 RX ORDER — SUCCINYLCHOLINE CHLORIDE 20 MG/ML
INJECTION INTRAMUSCULAR; INTRAVENOUS AS NEEDED
Status: DISCONTINUED | OUTPATIENT
Start: 2017-05-01 | End: 2017-05-01 | Stop reason: HOSPADM

## 2017-05-01 RX ORDER — SODIUM CHLORIDE 9 MG/ML
25 INJECTION, SOLUTION INTRAVENOUS CONTINUOUS
Status: DISCONTINUED | OUTPATIENT
Start: 2017-05-01 | End: 2017-05-01 | Stop reason: HOSPADM

## 2017-05-01 RX ORDER — DIAZEPAM 5 MG/1
5 TABLET ORAL
Status: DISCONTINUED | OUTPATIENT
Start: 2017-05-01 | End: 2017-05-02 | Stop reason: HOSPADM

## 2017-05-01 RX ORDER — MONTELUKAST SODIUM 10 MG/1
10 TABLET ORAL
Status: DISCONTINUED | OUTPATIENT
Start: 2017-05-02 | End: 2017-05-02 | Stop reason: HOSPADM

## 2017-05-01 RX ORDER — ROPIVACAINE HYDROCHLORIDE 5 MG/ML
30 INJECTION, SOLUTION EPIDURAL; INFILTRATION; PERINEURAL AS NEEDED
Status: DISCONTINUED | OUTPATIENT
Start: 2017-05-01 | End: 2017-05-01 | Stop reason: HOSPADM

## 2017-05-01 RX ORDER — ATENOLOL 25 MG/1
25 TABLET ORAL DAILY
Status: DISCONTINUED | OUTPATIENT
Start: 2017-05-02 | End: 2017-05-02 | Stop reason: HOSPADM

## 2017-05-01 RX ORDER — SODIUM CHLORIDE, SODIUM LACTATE, POTASSIUM CHLORIDE, CALCIUM CHLORIDE 600; 310; 30; 20 MG/100ML; MG/100ML; MG/100ML; MG/100ML
1000 INJECTION, SOLUTION INTRAVENOUS CONTINUOUS
Status: DISCONTINUED | OUTPATIENT
Start: 2017-05-01 | End: 2017-05-01 | Stop reason: HOSPADM

## 2017-05-01 RX ORDER — DEXAMETHASONE SODIUM PHOSPHATE 4 MG/ML
INJECTION, SOLUTION INTRA-ARTICULAR; INTRALESIONAL; INTRAMUSCULAR; INTRAVENOUS; SOFT TISSUE AS NEEDED
Status: DISCONTINUED | OUTPATIENT
Start: 2017-05-01 | End: 2017-05-01 | Stop reason: HOSPADM

## 2017-05-01 RX ORDER — GLYCOPYRROLATE 0.2 MG/ML
INJECTION INTRAMUSCULAR; INTRAVENOUS AS NEEDED
Status: DISCONTINUED | OUTPATIENT
Start: 2017-05-01 | End: 2017-05-01 | Stop reason: HOSPADM

## 2017-05-01 RX ORDER — SODIUM CHLORIDE 0.9 % (FLUSH) 0.9 %
5-10 SYRINGE (ML) INJECTION EVERY 8 HOURS
Status: DISCONTINUED | OUTPATIENT
Start: 2017-05-02 | End: 2017-05-02 | Stop reason: HOSPADM

## 2017-05-01 RX ORDER — TOPIRAMATE 25 MG/1
25 TABLET ORAL 2 TIMES DAILY WITH MEALS
Status: DISCONTINUED | OUTPATIENT
Start: 2017-05-02 | End: 2017-05-02 | Stop reason: HOSPADM

## 2017-05-01 RX ORDER — LIDOCAINE HYDROCHLORIDE 10 MG/ML
0.1 INJECTION, SOLUTION EPIDURAL; INFILTRATION; INTRACAUDAL; PERINEURAL AS NEEDED
Status: DISCONTINUED | OUTPATIENT
Start: 2017-05-01 | End: 2017-05-01 | Stop reason: HOSPADM

## 2017-05-01 RX ORDER — FACIAL-BODY WIPES
10 EACH TOPICAL DAILY PRN
Status: DISCONTINUED | OUTPATIENT
Start: 2017-05-03 | End: 2017-05-02 | Stop reason: HOSPADM

## 2017-05-01 RX ORDER — FENTANYL CITRATE 50 UG/ML
50 INJECTION, SOLUTION INTRAMUSCULAR; INTRAVENOUS AS NEEDED
Status: DISCONTINUED | OUTPATIENT
Start: 2017-05-01 | End: 2017-05-01 | Stop reason: HOSPADM

## 2017-05-01 RX ORDER — ONDANSETRON 2 MG/ML
4 INJECTION INTRAMUSCULAR; INTRAVENOUS AS NEEDED
Status: DISCONTINUED | OUTPATIENT
Start: 2017-05-01 | End: 2017-05-01 | Stop reason: HOSPADM

## 2017-05-01 RX ORDER — VANCOMYCIN 2 GRAM/500 ML IN 0.9 % SODIUM CHLORIDE INTRAVENOUS
2000 ONCE
Status: COMPLETED | OUTPATIENT
Start: 2017-05-01 | End: 2017-05-01

## 2017-05-01 RX ORDER — SODIUM CHLORIDE 9 MG/ML
125 INJECTION, SOLUTION INTRAVENOUS CONTINUOUS
Status: DISCONTINUED | OUTPATIENT
Start: 2017-05-01 | End: 2017-05-02 | Stop reason: HOSPADM

## 2017-05-01 RX ORDER — ALBUTEROL SULFATE 0.83 MG/ML
2.5 SOLUTION RESPIRATORY (INHALATION) AS NEEDED
Status: DISCONTINUED | OUTPATIENT
Start: 2017-05-01 | End: 2017-05-01 | Stop reason: HOSPADM

## 2017-05-01 RX ORDER — DIPHENHYDRAMINE HYDROCHLORIDE 50 MG/ML
12.5 INJECTION, SOLUTION INTRAMUSCULAR; INTRAVENOUS
Status: DISCONTINUED | OUTPATIENT
Start: 2017-05-01 | End: 2017-05-02 | Stop reason: HOSPADM

## 2017-05-01 RX ORDER — AMOXICILLIN 250 MG
1 CAPSULE ORAL 2 TIMES DAILY
Status: DISCONTINUED | OUTPATIENT
Start: 2017-05-01 | End: 2017-05-02 | Stop reason: HOSPADM

## 2017-05-01 RX ORDER — INSULIN LISPRO 100 [IU]/ML
INJECTION, SOLUTION INTRAVENOUS; SUBCUTANEOUS
Status: DISCONTINUED | OUTPATIENT
Start: 2017-05-01 | End: 2017-05-02 | Stop reason: HOSPADM

## 2017-05-01 RX ADMIN — ONDANSETRON 4 MG: 2 INJECTION INTRAMUSCULAR; INTRAVENOUS at 20:21

## 2017-05-01 RX ADMIN — ROCURONIUM BROMIDE 35 MG: 10 INJECTION, SOLUTION INTRAVENOUS at 16:01

## 2017-05-01 RX ADMIN — ONDANSETRON 4 MG: 2 INJECTION INTRAMUSCULAR; INTRAVENOUS at 16:05

## 2017-05-01 RX ADMIN — FENTANYL CITRATE 150 MCG: 50 INJECTION, SOLUTION INTRAMUSCULAR; INTRAVENOUS at 16:09

## 2017-05-01 RX ADMIN — SODIUM CHLORIDE 125 ML/HR: 900 INJECTION, SOLUTION INTRAVENOUS at 18:53

## 2017-05-01 RX ADMIN — SODIUM CHLORIDE, SODIUM LACTATE, POTASSIUM CHLORIDE, AND CALCIUM CHLORIDE 1000 ML: 600; 310; 30; 20 INJECTION, SOLUTION INTRAVENOUS at 14:14

## 2017-05-01 RX ADMIN — KETAMINE HYDROCHLORIDE 40 MG: 10 INJECTION, SOLUTION INTRAMUSCULAR; INTRAVENOUS at 16:05

## 2017-05-01 RX ADMIN — Medication 10 ML: at 21:24

## 2017-05-01 RX ADMIN — HYDROMORPHONE HYDROCHLORIDE 0.5 MG: 1 INJECTION, SOLUTION INTRAMUSCULAR; INTRAVENOUS; SUBCUTANEOUS at 16:30

## 2017-05-01 RX ADMIN — DIPHENHYDRAMINE HYDROCHLORIDE 12.5 MG: 50 INJECTION, SOLUTION INTRAMUSCULAR; INTRAVENOUS at 17:55

## 2017-05-01 RX ADMIN — PROPOFOL 170 MG: 10 INJECTION, EMULSION INTRAVENOUS at 15:53

## 2017-05-01 RX ADMIN — FENTANYL CITRATE 25 MCG: 50 INJECTION, SOLUTION INTRAMUSCULAR; INTRAVENOUS at 18:03

## 2017-05-01 RX ADMIN — LIDOCAINE HYDROCHLORIDE 100 MG: 20 INJECTION, SOLUTION EPIDURAL; INFILTRATION; INTRACAUDAL; PERINEURAL at 15:53

## 2017-05-01 RX ADMIN — NEOSTIGMINE METHYLSULFATE 4.5 MG: 1 INJECTION INTRAVENOUS at 16:53

## 2017-05-01 RX ADMIN — DEXAMETHASONE SODIUM PHOSPHATE 8 MG: 4 INJECTION, SOLUTION INTRA-ARTICULAR; INTRALESIONAL; INTRAMUSCULAR; INTRAVENOUS; SOFT TISSUE at 16:05

## 2017-05-01 RX ADMIN — LORATADINE 10 MG: 10 TABLET ORAL at 21:23

## 2017-05-01 RX ADMIN — FENTANYL CITRATE 25 MCG: 50 INJECTION, SOLUTION INTRAMUSCULAR; INTRAVENOUS at 17:54

## 2017-05-01 RX ADMIN — VANCOMYCIN HYDROCHLORIDE 2000 MG: 10 INJECTION, POWDER, LYOPHILIZED, FOR SOLUTION INTRAVENOUS at 14:29

## 2017-05-01 RX ADMIN — MORPHINE SULFATE: 5 INJECTION, SOLUTION INTRAVENOUS at 18:00

## 2017-05-01 RX ADMIN — ROCURONIUM BROMIDE 5 MG: 10 INJECTION, SOLUTION INTRAVENOUS at 15:53

## 2017-05-01 RX ADMIN — LIDOCAINE HYDROCHLORIDE 0.1 ML: 10 INJECTION, SOLUTION EPIDURAL; INFILTRATION; INTRACAUDAL; PERINEURAL at 14:13

## 2017-05-01 RX ADMIN — GLYCOPYRROLATE 0.9 MG: 0.2 INJECTION INTRAMUSCULAR; INTRAVENOUS at 16:53

## 2017-05-01 RX ADMIN — PRAVASTATIN SODIUM 40 MG: 40 TABLET ORAL at 21:23

## 2017-05-01 RX ADMIN — SUCCINYLCHOLINE CHLORIDE 200 MG: 20 INJECTION INTRAMUSCULAR; INTRAVENOUS at 15:54

## 2017-05-01 RX ADMIN — MIDAZOLAM HYDROCHLORIDE 5 MG: 1 INJECTION, SOLUTION INTRAMUSCULAR; INTRAVENOUS at 15:46

## 2017-05-01 RX ADMIN — FENTANYL CITRATE 100 MCG: 50 INJECTION, SOLUTION INTRAMUSCULAR; INTRAVENOUS at 15:53

## 2017-05-01 NOTE — ANESTHESIA PREPROCEDURE EVALUATION
Anesthetic History   No history of anesthetic complications            Review of Systems / Medical History  Patient summary reviewed, nursing notes reviewed and pertinent labs reviewed    Pulmonary        Sleep apnea    Asthma        Neuro/Psych   Within defined limits           Cardiovascular    Hypertension                   GI/Hepatic/Renal     GERD           Endo/Other    Diabetes    Morbid obesity and arthritis     Other Findings              Physical Exam    Airway  Mallampati: II  TM Distance: > 6 cm  Neck ROM: normal range of motion   Mouth opening: Normal     Cardiovascular  Regular rate and rhythm,  S1 and S2 normal,  no murmur, click, rub, or gallop             Dental  No notable dental hx       Pulmonary  Breath sounds clear to auscultation               Abdominal  GI exam deferred       Other Findings            Anesthetic Plan    ASA: 3  Anesthesia type: general          Induction: Intravenous  Anesthetic plan and risks discussed with: Patient

## 2017-05-01 NOTE — BRIEF OP NOTE
[address], [address]    BRIEF OPERATIVE NOTE    Date of Procedure: 5/1/2017   Preoperative Diagnosis: HNP C4-5   Postoperative Diagnosis: HNP C4-5     Procedure: Procedure(s):   ANTERIOR CERVICAL FUSION C4-5   Surgeon: Dr. Edward Dixon  Assistant(s): Clint Correa PA-C  Anesthesia: General  Estimated Blood Loss: 20cc  Specimens: * No specimens in log *   Findings: Disc herniation  Complications: None  Implants:   Implant Name Type Inv.  Item Serial No.  Lot No. LRB No. Used Action   BONE SPCR CERV LORDC 5X7MM --  - [id number]  BONE SPCR CERV LORDC 5X7MM --  [id number] [facility] TISSUE [facility] N/A N/A 1 Implanted   PLATE SPNE CERV 1L ASSURE 14MM --  - SN/A  PLATE SPNE CERV 1L ASSURE 14MM --  N/A [employer] N/A N/A 1 Implanted   SCR SPNE RIDIG ST ASSURE 4X12 --  - SN/A  SCR SPNE RIDIG ST ASSURE 4X12 --  N/A [employer] N/A N/A 4 Implanted   SCR SET RIGID ASSURE --  - SN/A   SCR SET RIGID ASSURE --  N/A [employer] N/A N/A 4 Implanted       David Taverasma  5/1/2017

## 2017-05-01 NOTE — IP AVS SNAPSHOT
[address] [facility] [address] 
[phone] Patient: Marian Rebolledo MRN: [id number] :1975 You are allergic to the following Allergen Reactions Pcn (Penicillins) Swelling Swelling throat Shellfish Containing Products Swelling Swells throat and eyes Recent Documentation Height Weight BMI OB Status Smoking Status 1.651 m 106.6 kg 39.11 kg/m2 Hysterectomy Never Smoker Emergency Contacts Name Discharge Info Relation Home Work Mobile Ghada Aly CAREGIVER [3] Daughter [21] [phone] Ramonita Treviño NO [2] Mother [14] [phone] [address] CAREGIVER [3] Other Relative [6] [phone] Linette Leung  Spouse [3] [phone] About your hospitalization You were admitted on:  May 1, 2017 You last received care in the:  [address] [place] [address] You were discharged on:  May 2, 2017 Unit phone number:  [phone] Why you were hospitalized Your primary diagnosis was:  Not on File Your diagnoses also included:  Cervical Stenosis Of Spine Providers Seen During Your Hospitalizations Provider Role Specialty Primary office phone Destini Kay MD Attending Provider Orthopedic Surgery [phone] Your Primary Care Physician (PCP) Primary Care Physician Office Phone Office Fax Rigo Bernstein [phone] Follow-up Information Follow up With Details Comments Contact Info Luke Whelan MD   [address] [facility] 
[phone] Your Appointments Tuesday May 30, 2017 10:20 AM EDT Any with Garrick Mcclellan MD  
[address] [facility] ([address]) [facility] [address] [phone] Current Discharge Medication List  
  
 START taking these medications Dose & Instructions Dispensing Information Comments Morning Noon Evening Bedtime  
 oxyCODONE IR 5 mg immediate release tablet Commonly known as:  Kala Barrera Replaces:  oxyCODONE ER 10 mg ER tablet Your last dose was: Your next dose is:    
   
   
 Dose:  5-10 mg Take 1-2 Tabs by mouth every four (4) hours as needed. Max Daily Amount: 60 mg.  
 Quantity:  60 Tab Refills:  0 CONTINUE these medications which have CHANGED Dose & Instructions Dispensing Information Comments Morning Noon Evening Bedtime  
 glimepiride 2 mg tablet Commonly known as:  AMARYL What changed:   
- how much to take - when to take this 
- additional instructions Your last dose was: Your next dose is:    
   
   
 Dose:  2 mg Take 1 Tab by mouth two (2) times a day. Quantity:  60 Tab Refills:  0 No further refills, patient needs appointment  
    
   
   
   
  
 insulin glargine 100 unit/mL (3 mL) pen Commonly known as:  LANTUS SOLOSTAR What changed:   
- how much to take - when to take this 
- additional instructions Your last dose was: Your next dose is: INJECT 30 UNITS DAILY Quantity:  15 Each Refills:  0  
     
   
   
   
  
 metFORMIN  mg tablet Commonly known as:  GLUCOPHAGE XR What changed:  See the new instructions. Your last dose was: Your next dose is:    
   
   
 take 1 tablet by mouth once daily Quantity:  90 Tab Refills:  3  
 pt needs to schedule appointment and labs CONTINUE these medications which have NOT CHANGED Dose & Instructions Dispensing Information Comments Morning Noon Evening Bedtime  
 atenolol 25 mg tablet Commonly known as:  TENORMIN Your last dose was: Your next dose is:    
   
   
 take 1 tablet by mouth once daily Quantity:  30 Tab Refills:  3 BD LUER-MARIELY SYRINGE 3 mL 25 gauge x 1\" Syrg Generic drug:  Syringe with Needle (Disp) Your last dose was: Your next dose is:    
   
   
 USE TO INJECT 1 ML EVERY 2 WEEKS Quantity:  4 Syringe Refills:  4  
     
   
   
   
  
 cyanocobalamin 1,000 mcg/mL injection Commonly known as:  VITAMIN B12 Your last dose was: Your next dose is:    
   
   
 EVERY OTHER WEEK Refills:  0  
     
   
   
   
  
 cyclobenzaprine 10 mg tablet Commonly known as:  FLEXERIL Your last dose was: Your next dose is:    
   
   
 take 1 tablet by mouth three times a day if needed for muscle spasm Quantity:  20 Tab Refills:  0  
     
   
   
   
  
 FISH OIL PO Your last dose was: Your next dose is: Take  by mouth daily. Refills:  0  
     
   
   
   
  
 furosemide 40 mg tablet Commonly known as:  LASIX Your last dose was: Your next dose is:    
   
   
 take 1 tablet by mouth once daily Quantity:  30 Tab Refills:  1  
 pt needs to schedile appt  
    
   
   
   
  
 glucose blood VI test strips strip Commonly known as:  Ascensia CONTOUR Your last dose was: Your next dose is:    
   
   
 E11.69 Test blood sugar 3 times a day Quantity:  100 Strip Refills:  3 HAIR,SKIN AND NAILS PO Your last dose was: Your next dose is: Take  by mouth daily. Refills:  0 Lancets Misc Your last dose was: Your next dose is:    
   
   
 E11.69 Quantity:  100 Each Refills:  3  
     
   
   
   
  
 linaclotide 145 mcg Cap capsule Commonly known as:  Yobani Mckeon Your last dose was: Your next dose is:    
   
   
 Dose:  145 mcg Take 1 Cap by mouth Daily (before breakfast). Quantity:  30 Cap Refills:  2  
     
   
   
   
  
 loratadine 10 mg tablet Commonly known as:  Quinten Gusman Your last dose was: Your next dose is:    
   
   
 Dose:  10 mg Take 1 Tab by mouth nightly. Quantity:  30 Tab Refills:  6  
     
   
   
   
  
 montelukast 10 mg tablet Commonly known as:  SINGULAIR Your last dose was: Your next dose is:    
   
   
 take 1 tablet by mouth once daily Quantity:  30 Tab Refills:  6 MULTIVITAMIN PO Your last dose was: Your next dose is: Take  by mouth daily. Refills:  0  
     
   
   
   
  
 NYSTOP powder Generic drug:  nystatin Your last dose was: Your next dose is:    
   
   
 two (2) times a day. Refills:  0 phentermine 37.5 mg tablet Commonly known as:  ADIPEX-P Your last dose was: Your next dose is:    
   
   
 Dose:  37.5 mg Take 1 Tab by mouth every morning. Max Daily Amount: 37.5 mg.  
 Quantity:  30 Tab Refills:  0  
     
   
   
   
  
 polyethylene glycol 17 gram packet Commonly known as:  Zuleika Lulú Your last dose was: Your next dose is:    
   
   
 Dose:  17 g Take 1 Packet by mouth daily. Quantity:  30 Each Refills:  5  
     
   
   
   
  
 potassium chloride 20 mEq tablet Commonly known as:  K-DUR, KLOR-CON Your last dose was: Your next dose is:    
   
   
 take 1 tablet by mouth once daily Quantity:  30 Tab Refills:  5  
     
   
   
   
  
 pravastatin 40 mg tablet Commonly known as:  PRAVACHOL Your last dose was: Your next dose is:    
   
   
 Dose:  40 mg Take 1 Tab by mouth nightly. CHANGE IN THERAPY Quantity:  90 Tab Refills:  1  
     
   
   
   
  
 topiramate 25 mg tablet Commonly known as:  TOPAMAX Your last dose was: Your next dose is:    
   
   
 Dose:  25 mg Take 1 Tab by mouth two (2) times daily (with meals). Quantity:  60 Tab Refills:  6  
     
   
   
   
  
 traZODone 100 mg tablet Commonly known as:  Shelva [place]  
   
 Your last dose was: Your next dose is:    
   
   
 take 1 tablet by mouth at bedtime Quantity:  90 Tab Refills:  3 VITAMIN D3 2,000 unit Cap capsule Generic drug:  Cholecalciferol (Vitamin D3) Your last dose was: Your next dose is:    
   
   
 take 1 capsule by mouth once daily Quantity:  90 Cap Refills:  1 VITAMIN E PO Your last dose was: Your next dose is: Take  by mouth daily. Refills:  0 STOP taking these medications   
 aspirin delayed-release 81 mg tablet  
   
  
 oxyCODONE ER 10 mg ER tablet Commonly known as:  OxyCONTIN Replaced by:  oxyCODONE IR 5 mg immediate release tablet Where to Get Your Medications Information on where to get these meds will be given to you by the nurse or doctor. ! Ask your nurse or doctor about these medications  
  oxyCODONE IR 5 mg immediate release tablet Discharge Instructions Patient meets criteria for BUNDLED PAYMENT  
for Care Improvement Initiative Criteria Contact Information for Orthopedic Nurse Navigator:     
THIERNO Mike, RN-BC 
X:[phone] L:[phone] [facility], [facility]. Jewel Walker MD 
Will GUILLERMINA Weinstein PA-C 
[phone] After Hospital Care Plan:  Discharge Instructions Cervical (Neck) Spine Surgery Patient Name Michael Jasso Date of procedure: 17  Date of discharge: 17 Procedure (Procedure(s): ANTERIOR CERVICAL FUSION C4-5 ) Surgeon (Surgeon(s) and Role: Andi hCiu MD - Primary)   PCP: 
 
MAY RESTART ASPIRIN 17 Follow up appointments 
-Follow up with Dr. Jose Antonio Mg in 2 weeks. Call [phone] to make an appointment as soon as you get home from the hospital. 
 
When to call your Orthopaedic Surgeon: -Difficulty swallowing that is worse than when you left the hospital 
-Signs of infection-if your incision is red; continues to have drainage; drainage has a foul odor or if you have a persistent fever over 101 degrees for 24 hours 
-Nausea or vomiting, severe headache 
-Changes in sensation in your arms or legs (numbness, tingling, loss of color) -Increased weakness-greater than before your surgery 
-Severe pain or pain not relieved by medications 
-Signs of a blood clot in your leg-calf pain, tenderness, redness, swelling of lower leg When to call your Primary Care Physician: 
-Concerns about medical conditions such as diabetes, high blood pressure, asthma, congestive heart failure 
-Call if blood sugars are elevated, persistent headache or dizziness, coughing or congestion, constipation or diarrhea, burning with urination, abnormal heart rate When to call 911 and go to the nearest emergency room: 
-Acute onset of chest pain, shortness of breath or difficulty breathing Activity 
-You are going home a well person, be as active as possible. Your only exercise should be walking. Start with short frequent walks and increase your walking distance each day. 
-Limit the amount of time you sit to 20-30 minute intervals. Sitting for prolonged periods of time will be uncomfortable for you following surgery. - Do NOT lift anything over 10 pounds 
-Do NOT do any neck exercises until you have been instructed by your doctor 
-When you are in bed, you may lay on your back or on either side. Do NOT lie on your stomach Cervical Collar (Aspen Collar) -You are required to wear your cervical collar at all times; except while showering. You may remove the collar long enough to change the pads when needed and to change your dressing each day. -Do not bend or twist when your collar is off. It is best to have someone assist you when changing the pads and your dressing to prevent you from bending your neck. -Everyday, apply a clean set of pads to your collar. Use a mild soap and water to clean the pads. Pat them dry with a towel and lay out to air dry. Do not use heat to dry the pads. Diet 
-Resume usual diet; drink plenty of fluids; eat foods high in fiber 
-It is important to have regular bowel movements. Pain medications may cause constipation. You may want to take a stool softener (such as Senokot-S or Colace) to prevent constipation. If constipation occurs, take a laxative (such as Dulcolax tablets, Milk of Magnesia, or a suppository). Laxatives should only be used if the above preventable measures have failed and you still have not had a bowel moveme Driving 
-You may not drive or return to work until instructed by your physician. However, you may ride in the car for short periods of time. Incision Care 
-You may take brief showers but do not run the water run directly onto the wound. After showering or bathing, remove the wet dressing and gently blot the wound dry with a soft towel. 
-Do not rub or apply any lotions or ointments to your incision site.  
-Do not soak or scrub your wound 
-Keep a dry dressing (ABD and paper tape) on your incision and have it changed daily for 6 days after surgery; more often if your incision is draining. Have your caregiver wash their hands thoroughly before changing your dressing. 
-You will have absorbable sutures and steri-strips (white tape) on your incision. Leave the steri-strips on until they fall off. Showering 
-You may shower in approximately 4 days after your surgery.   
-Leave the dressing on during your shower. Do NOT allow the water to run directly onto your dressing. Once you get out of the shower, put on a dry dressing. 
-Reminder- neck collars do not need to be worn while in the shower. Make sure you put clean dry pads on your collar after your shower. 
-Do not take a tub bath. Preventing blood clots -You have been given T.E.D. stockings to wear. Continue to wear these for 7 days after your discharge. Put them on in the morning and take them off at night.   
-They are used to increase your circulation and prevent blood clots from forming in your legs 
-T. E.D. stockings can be machine washed, temperature not to exceed 160° F (71°C) and machine dried for 15 to 20 minutes, temperature not to exceed 250° F (121°C). Pain management 
-Take pain medication as prescribed; decrease the amount you use as your pain lessens 
-Do not wait until you are in extreme pain to take your medication 
-Avoid alcoholic beverages while taking pain medication Pain Medication Safety DO: 
-Read the Medication Guide  
-Take your medicine exactly as prescribed  
-Store your medicine away from children and in a safe place  
-Flush unused medicine down the toilet  
-Call your healthcare provider for medical advice about side effects. You may report side effects to FDA at 9-873-FDA-0879.  
-Please be aware that many medications contain Tylenol. We do not want you to over medicate so please read the information below as a guide. Do not take more than 4 Grams of Tylenol in a 24 hour period. (There are 1000 milligrams in one Gram) Percocet contains 325 mg of Tylenol per tablet (do not take more than 12 tablets in 24 hours) Norco contains 325 mg of Tylenol per tablet (do not take more than 12 tablets in 24 hours) DO NOT: 
-Do not give your medicine to others  
-Do not take medicine unless it was prescribed for you  
-Do not stop taking your medicine without talking to your healthcare provider  
-Do not break, chew, crush, dissolve, or inject your medicine.  If you cannot swallow your medicine whole, talk to your healthcare provider. 
-Do not drink alcohol while taking this medicine 
-Do not take anti-inflammatory medications unless instructed by your     physician. Discharge Orders None ACO Transitions of Care Introducing [employer] [address]ria offers a voluntary care coordination program to provide high quality service and care to [facility] fee-for-service beneficiaries. Baldomero Castelan was designed to help you enhance your health and well-being through the following services: ? Transitions of Care  support for individuals who are transitioning from one care setting to another (example: Hospital to home). ? Chronic and Complex Care Coordination  support for individuals and caregivers of those with serious or chronic illnesses or with more than one chronic (ongoing) condition and those who take a number of different medications. If you meet specific medical criteria, a [facility] [address] may call you directly to coordinate your care with your primary care physician and your other care providers. For questions about the [facility] programs, please, contact your physicians office. For general questions or additional information about Accountable Care Organizations: 
Please visit www.medicare.gov/acos. html or call 1-800-MEDICARE (1-810.446.3354) TTY users should call 7-424.691.5489. Introducing [facility]! Ruchi Abbott introduces Invisible Puppy patient portal. Now you can access parts of your medical record, email your doctor's office, and request medication refills online. 1. In your internet browser, go to [email or web address] 2. Click on the First Time User? Click Here link in the Sign In box. You will see the New Member Sign Up page. 3. Enter your Invisible Puppy Access Code exactly as it appears below.  You will not need to use this code after youve completed the sign-up process. If you do not sign up before the expiration date, you must request a new code. · [employer]ik Access Code: [email or web address] Expires: 5/21/2017 12:44 PM 
 
4. Enter the last four digits of your Social Security Number (xxxx) and Date of Birth (mm/dd/yyyy) as indicated and click Submit. You will be taken to the next sign-up page. 5. Create a Meddik ID. This will be your [employer]ik login ID and cannot be changed, so think of one that is secure and easy to remember. 6. Create a [employer]dik password. You can change your password at any time. 7. Enter your Password Reset Question and Answer. This can be used at a later time if you forget your password. 8. Enter your e-mail address. You will receive e-mail notification when new information is available in [address] 9. Click Sign Up. You can now view and download portions of your medical record. 10. Click the Download Summary menu link to download a portable copy of your medical information. If you have questions, please visit the Frequently Asked Questions section of the [employer] website. Remember, [employer] is NOT to be used for urgent needs. For medical emergencies, dial 911. Now available from your iPhone and Android! General Information Please provide this summary of care documentation to your next provider. Patient Signature:  ____________________________________________________________ Date:  ____________________________________________________________  
  
[facility] Provider Signature:  ____________________________________________________________ Date:  ____________________________________________________________

## 2017-05-01 NOTE — PERIOP NOTES
TRANSFER - OUT REPORT:    Verbal report given to Claude Egan RN(name) on Ade Caal  being transferred to 528(unit) for routine post - op       Report consisted of patients Situation, Background, Assessment and   Recommendations(SBAR). Time Pre op antibiotic given:1429  Anesthesia Stop time: 259  Altamirano Present on Transfer to floor:no  Order for Altamirano on Chart:no    Information from the following report(s) SBAR, Kardex, Intake/Output and MAR was reviewed with the receiving nurse. Opportunity for questions and clarification was provided. Is the patient on 02? YES       L/Min 2       Other na    Is the patient on a monitor? NO    Is the nurse transporting with the patient? NO    Surgical Waiting Area notified of patient's transfer from PACU?  YES      The following personal items collected during your admission accompanied patient upon transfer:   Dental Appliance: Dental Appliances: None  Vision: Visual Aid: None  Hearing Aid:    Jewelry:    Clothing: Clothing:  (clothing bag to pacu; cervical collar to pacu w/ pt)  Other Valuables:    Valuables sent to safe:

## 2017-05-02 VITALS
WEIGHT: 235.01 LBS | RESPIRATION RATE: 18 BRPM | OXYGEN SATURATION: 98 % | DIASTOLIC BLOOD PRESSURE: 77 MMHG | HEART RATE: 85 BPM | HEIGHT: 65 IN | BODY MASS INDEX: 39.16 KG/M2 | SYSTOLIC BLOOD PRESSURE: 132 MMHG | TEMPERATURE: 98.4 F

## 2017-05-02 LAB
GLUCOSE BLD STRIP.AUTO-MCNC: 154 MG/DL (ref 65–100)
SERVICE CMNT-IMP: ABNORMAL

## 2017-05-02 PROCEDURE — 74011000250 HC RX REV CODE- 250: Performed by: ORTHOPAEDIC SURGERY

## 2017-05-02 PROCEDURE — 74011250637 HC RX REV CODE- 250/637: Performed by: PHYSICIAN ASSISTANT

## 2017-05-02 PROCEDURE — 82962 GLUCOSE BLOOD TEST: CPT

## 2017-05-02 PROCEDURE — 74011250637 HC RX REV CODE- 250/637: Performed by: ORTHOPAEDIC SURGERY

## 2017-05-02 PROCEDURE — 74011636637 HC RX REV CODE- 636/637: Performed by: PHYSICIAN ASSISTANT

## 2017-05-02 PROCEDURE — 74011250636 HC RX REV CODE- 250/636: Performed by: ORTHOPAEDIC SURGERY

## 2017-05-02 PROCEDURE — 77030012893

## 2017-05-02 PROCEDURE — 74011250636 HC RX REV CODE- 250/636: Performed by: PHYSICIAN ASSISTANT

## 2017-05-02 RX ORDER — OXYCODONE HYDROCHLORIDE 5 MG/1
5-10 TABLET ORAL
Qty: 60 TAB | Refills: 0 | Status: SHIPPED | OUTPATIENT
Start: 2017-05-02 | End: 2017-10-04

## 2017-05-02 RX ORDER — OXYCODONE HYDROCHLORIDE 5 MG/1
10 TABLET ORAL
Status: DISCONTINUED | OUTPATIENT
Start: 2017-05-02 | End: 2017-05-02 | Stop reason: HOSPADM

## 2017-05-02 RX ADMIN — POTASSIUM CHLORIDE 20 MEQ: 750 TABLET, FILM COATED, EXTENDED RELEASE ORAL at 11:01

## 2017-05-02 RX ADMIN — VITAMIN D, TAB 1000IU (100/BT) 2000 UNITS: 25 TAB at 11:01

## 2017-05-02 RX ADMIN — POLYETHYLENE GLYCOL 3350 17 G: 17 POWDER, FOR SOLUTION ORAL at 11:00

## 2017-05-02 RX ADMIN — ONDANSETRON 4 MG: 2 INJECTION INTRAMUSCULAR; INTRAVENOUS at 00:33

## 2017-05-02 RX ADMIN — BENZOCAINE AND MENTHOL 1 LOZENGE: 15; 3.6 LOZENGE ORAL at 07:54

## 2017-05-02 RX ADMIN — OXYCODONE HYDROCHLORIDE 10 MG: 5 TABLET ORAL at 02:01

## 2017-05-02 RX ADMIN — DIAZEPAM 5 MG: 5 TABLET ORAL at 07:54

## 2017-05-02 RX ADMIN — ATENOLOL 25 MG: 25 TABLET ORAL at 11:01

## 2017-05-02 RX ADMIN — DOCUSATE SODIUM AND SENNOSIDES 1 TABLET: 8.6; 5 TABLET, FILM COATED ORAL at 11:01

## 2017-05-02 RX ADMIN — VANCOMYCIN HYDROCHLORIDE 2000 MG: 10 INJECTION, POWDER, LYOPHILIZED, FOR SOLUTION INTRAVENOUS at 02:06

## 2017-05-02 RX ADMIN — OXYCODONE HYDROCHLORIDE 10 MG: 5 TABLET ORAL at 07:02

## 2017-05-02 RX ADMIN — SODIUM CHLORIDE 125 ML/HR: 900 INJECTION, SOLUTION INTRAVENOUS at 02:05

## 2017-05-02 RX ADMIN — INSULIN LISPRO 2 UNITS: 100 INJECTION, SOLUTION INTRAVENOUS; SUBCUTANEOUS at 07:02

## 2017-05-02 RX ADMIN — SODIUM CHLORIDE 5 MG: 9 INJECTION INTRAMUSCULAR; INTRAVENOUS; SUBCUTANEOUS at 01:56

## 2017-05-02 NOTE — PROGRESS NOTES
ORTHOPAEDIC CERVICAL FUSION PROGRESS NOTE    NAME: Toshia Shipman   :  1975   MRN:  [id number]   DATE:  2017    POD: 1 Day Post-Op  S/P: Procedure(s):   ANTERIOR CERVICAL FUSION C4-5     SUBJECTIVE:  Patient is lying with head elevated and appears well. Nurse is at the bedside. She has some complaints of pain across the shoulders. Pain seems to be managed. No arm pain or numbness.  + Sore throat. No difficulty swallowing. Afebrile/VSS. No nausea/vomiting. Voiding. Patient Vitals for the past 12 hrs:   BP Temp Pulse Resp SpO2   17 0213 142/82 97.8 °F (36.6 °C) 85 14 95 %   17 2205 141/83 97.6 °F (36.4 °C) 74 16 95 %   17 2123 134/84 97.8 °F (36.6 °C) 89 18 97 %   17 120/83 97.5 °F (36.4 °C) 83 16 92 %       Exam:  Positive strength/ROM bilat upper ext. Neuro intact   Dressings clean and dry  ASPEN collar inplace / intact. Collar adjusted. PLAN:  PO pain medications as needed  PT, OOB  Cepachol lozenges at bedside  Bowel regimen. Slowly advance diet as tolerated.   VTE prophylaxis:  SCDs and TEDs  Encourage use of ICS  Remove drain  Discharge Home today pending no changes      Geraldyne Fabry, PA

## 2017-05-02 NOTE — PROGRESS NOTES
[facility]  SBAR Bundled Payment Handoff     FROM:                                TO: Home                                                      ([address] [place] [address] or Facility name)  [address]  [address] [id number]  Dept: [address]: [phone]                                      Room#:  225/59                                                      Discharging Nurse: Bree Ludwig RN  Unit AG#:[id number]         SITUATION      ASAScore: ASA 3 - Patient with moderate systemic disease with functional limitations    Admitted:  5/1/2017  Hospital Day: 2      Attending Provider:  No att. providers found     Consultations:  None    PCP:  Doreen Mccord MD   [phone]     Admitting Dx:  HNP C4-5   Cervical stenosis of spine       Active Problems:    Cervical stenosis of spine (5/1/2017)      1 Day Post-Op of   Procedure(s):   ANTERIOR CERVICAL FUSION C4-5    BY: Telly Evans MD             ON: 5/1/2017                  Code Status: Full Code             Advance Directive? No Doesnt Have (Send w/patient)     Isolation:  There are currently no Active Isolations       MDRO: No current active infections    BACKGROUND     Allergies:   Allergies   Allergen Reactions    Pcn [Penicillins] Swelling     Swelling throat    Shellfish Containing Products Swelling     Swells throat and eyes       Past Medical History:   Diagnosis Date    Arthritis     lower back    Asthma     last attack 1994    Bipolar 1 disorder ([address]     Chronic neck pain     DDD (degenerative disc disease), cervical     followed by ortho    Diabetes mellitus type 2, controlled ([address]     GERD (gastroesophageal reflux disease)     High cholesterol     HTN, goal below 140/90     Migraines     Morbid obesity ([address]     SONY (obstructive sleep apnea)        Past Surgical History:   Procedure Laterality Date    BREAST SURGERY PROCEDURE UNLISTED  2/21/13     RIGHT BREAST EXCISION OF MULTIPLE SEBACEOUS CYSTS    HX BREAST BIOPSY  2014    LEFT BREAST EXCISION SEBACEOUS CYST, RIGHT CHEST WALL SEBACEOUS CYST EXCISION performed by Rishi [facility]ler [facility] at [facility] MAIN OR    HX  SECTION      HX HEENT      wisdom teeth    HX OTHER SURGICAL      spinal tap - viral menigitis    HX OTHER SURGICAL      SEBACEOUS CYST-BACK    HX TOTAL LAPAROSCOPIC HYSTERECTOMY W/ BS&O         Prior to Admission Medications   Prescriptions Last Dose Informant Patient Reported? Taking? BD LUER-MARIELY SYRINGE 3 mL 25 gauge x 1\" syrg   No No   Sig: USE TO INJECT 1 ML EVERY 2 WEEKS   DOCOSAHEXANOIC ACID/EPA (FISH OIL PO) 2017 at Unknown time  Yes Yes   Sig: Take  by mouth daily. Lancets misc 2017 at Unknown time  No Yes   Sig: E11.69   MULTIVITAMIN PO 2017 at Unknown time  Yes Yes   Sig: Take  by mouth daily. MULTIVITAMIN WITH MINERALS (HAIR,SKIN AND NAILS PO) 2017 at Unknown time  Yes Yes   Sig: Take  by mouth daily. NYSTOP powder 2017 at Unknown time  Yes Yes   Sig: two (2) times a day. VITAMIN D3 2,000 unit cap capsule 2017 at Unknown time  No Yes   Sig: take 1 capsule by mouth once daily   VITAMIN E ACETATE (VITAMIN E PO) 2017 at Unknown time  Yes Yes   Sig: Take  by mouth daily. atenolol (TENORMIN) 25 mg tablet 2017 at Unknown time  No Yes   Sig: take 1 tablet by mouth once daily   cyanocobalamin (VITAMIN B12) 1,000 mcg/mL injection 2017 at Unknown time  Yes Yes   Sig: EVERY OTHER WEEK   cyclobenzaprine (FLEXERIL) 10 mg tablet 2017 at Unknown time  No Yes   Sig: take 1 tablet by mouth three times a day if needed for muscle spasm   furosemide (LASIX) 40 mg tablet 2017 at Unknown time  No Yes   Sig: take 1 tablet by mouth once daily   glimepiride (AMARYL) 2 mg tablet 2017 at Unknown time  No Yes   Sig: Take 1 Tab by mouth two (2) times a day. Patient taking differently: Take 4 mg by mouth nightly.  PATIENT STATES THAT SHE TAKES 4 MG AT BEDTIME INSTEAD OF 2 MG BID AND THAT HER PRESCRIBING PHYSICIAN KNOWS THIS   glucose blood VI test strips (ASCENSIA CONTOUR) strip 4/30/2017 at Unknown time  No Yes   Sig: E11.69  Test blood sugar 3 times a day   insulin glargine (LANTUS SOLOSTAR) 100 unit/mL (3 mL) pen 4/29/2017  No No   Sig: INJECT 30 UNITS DAILY   Patient taking differently: 30 Units nightly. INJECT 30 UNITS DAILY   linaclotide (LINZESS) 145 mcg cap capsule 5/1/2017 at Unknown time  No Yes   Sig: Take 1 Cap by mouth Daily (before breakfast). loratadine (CLARITIN) 10 mg tablet 4/29/2017  No No   Sig: Take 1 Tab by mouth nightly. metFORMIN ER (GLUCOPHAGE XR) 500 mg tablet 4/30/2017 at Unknown time  No Yes   Sig: take 1 tablet by mouth once daily   Patient taking differently: PATIENT STATES SHE IS TAKING 1000 MG AT BEDTIME AND THAT HER PHYSICIAN KNOWS THIS. SHE HAS DONE THIS SINCE 2010 PER PATIENT   montelukast (SINGULAIR) 10 mg tablet 4/29/2017  No No   Sig: take 1 tablet by mouth once daily   phentermine (ADIPEX-P) 37.5 mg tablet 4/1/2017 at Unknown time  No Yes   Sig: Take 1 Tab by mouth every morning. Max Daily Amount: 37.5 mg.   polyethylene glycol (MIRALAX) 17 gram packet 4/1/2017 at Unknown time  No Yes   Sig: Take 1 Packet by mouth daily. potassium chloride (K-DUR, KLOR-CON) 20 mEq tablet 4/30/2017 at Unknown time  No Yes   Sig: take 1 tablet by mouth once daily   pravastatin (PRAVACHOL) 40 mg tablet 4/30/2017 at Unknown time  No Yes   Sig: Take 1 Tab by mouth nightly. CHANGE IN THERAPY   topiramate (TOPAMAX) 25 mg tablet 4/24/2017 at Unknown time  No Yes   Sig: Take 1 Tab by mouth two (2) times daily (with meals). traZODone (DESYREL) 100 mg tablet 4/30/2017 at Unknown time  No Yes   Sig: take 1 tablet by mouth at bedtime      Facility-Administered Medications: None       Vaccinations:    Immunization History   Administered Date(s) Administered    Tdap 03/10/2014         ASSESSMENT   Age: 46 y.o.              Gender: female Height: Height: 5' 5\" (165.1 cm)                    Weight:Weight: 106.6 kg (235 lb 0.2 oz)     No data found. Active Orders   Diet    DIET GI LITE (POST SURGICAL)       Orientation: Orientation Level: Oriented X4    Active Lines/Drains:  (Peg Tube / [place] / CL or S/L?):no    Urinary Status: Voiding      Last BM:       Skin Integrity: Incision (comment)   Wound Neck Anterior-DRESSING STATUS: Clean, dry, and intact    Wound Neck Anterior-DRESSING TYPE: Dry dressing    Mobility: Slightly limited   Weight Bearing Status: WBAT (Weight Bearing as Tolerated)            On Anticoagulation? NO        Pain Medications given:  Oxycodone 10mg                                Last dose: 5/2/2017 at  0702    Lab Results   Component Value Date/Time    Glucose 89 04/02/2017 09:41 PM    Hemoglobin A1c 5.8 04/07/2017 12:15 PM    INR 1.0 04/07/2017 12:15 PM    HGB 13.3 04/02/2017 09:41 PM    HGB 9.5 08/10/2016 03:35 AM    HGB 11.3 08/02/2016 08:59 AM    HGB 11.5 06/04/2014 09:14 AM       Readmission Risks:  Score:         RECOMMENDATION     See After Visit Summary (AVS) for:  · Discharge instructions  · After [address]   · Medication Reconciliation          [facility] Orthopaedic Nurse Navigator  THIERNO Perkins, RN-BC       Office  [phone]  Cell      [phone]  Fax      [phone]  [email or web address]             . Tommy

## 2017-05-02 NOTE — PROGRESS NOTES
TRANSFER - IN REPORT:    Verbal report received from Ripu (name) on Elliot Palencia  being received from PACU (unit) for routine progression of care via telephone at 18:15. Report consisted of patients Situation, Background, Assessment and   Recommendations(SBAR). Information from the following report(s) SBAR, Procedure Summary, Intake/Output and MAR was reviewed with the receiving nurse. Opportunity for questions and clarification was provided. Assessment completed upon patients arrival to unit and care assumed.

## 2017-05-02 NOTE — ANESTHESIA POSTPROCEDURE EVALUATION
Post-Anesthesia Evaluation and Assessment    Patient: Ramon Newell MRN: [id number]  SSN: [id number]    YOB: 1975  Age: 46 y.o. Sex: female       Cardiovascular Function/Vital Signs  Visit Vitals    /82    Pulse 85    Temp 36.6 °C (97.8 °F)    Resp 14    Ht 5' 5\" (1.651 m)    Wt 106.6 kg (235 lb 0.2 oz)    SpO2 95%    BMI 39.11 kg/m2       Patient is status post general anesthesia for Procedure(s):   ANTERIOR CERVICAL FUSION C4-5 . Nausea/Vomiting: None    Postoperative hydration reviewed and adequate. Pain:  Pain Scale 1: Numeric (0 - 10) (05/02/17 0213)  Pain Intensity 1: 10 (05/02/17 0213)   Managed    Neurological Status:   Neuro [facility]):  (numbness down LT arm) (05/01/17 1347)  Neuro  Neurologic State: Drowsy (05/01/17 2020)  Orientation Level: Oriented X4 (05/01/17 2020)  Cognition: Follows commands (05/01/17 2020)  Speech: Clear (05/01/17 2020)   At baseline    Mental Status and Level of Consciousness: Arousable    Pulmonary Status:   O2 Device: Room air (05/02/17 0542)   Adequate oxygenation and airway patent    Complications related to anesthesia: None    Post-anesthesia assessment completed.  No concerns    Signed By: Savita Craven MD     May 2, 2017

## 2017-05-02 NOTE — OP NOTES
[facility] [address], [address]   OP NOTE       Name:  Ac Henley   MR#:  [id number]   :  1975   Account #:  [de-identified]    Surgery Date:     Date of Adm:  2017       PREOPERATIVE DIAGNOSIS: Herniated cervical disc, C4-C5,   predominant left-sided radiculopathy. POSTOPERATIVE DIAGNOSIS: Herniated cervical disc, C4-C5,   predominant left-sided radiculopathy. PROCEDURES PERFORMED: Anterior cervical discectomy and   fusion, C4-C5, using structural allograft (VG2 supplemented with   Globus endocervical plating). SURGEON: AVIVA Bai MD    ASSISTANT: Armond Zuniga PA-C    ESTIMATED BLOOD LOSS: 20cc    SPECIMENS REMOVED: None    ANESTHESIA: General.    INDICATIONS: A 72-year-old female seen with complaints of neck,   shoulder, arm pain to the left side. She failed to improve with   conservative measures. Ultimately MR scan of his neck completed   showing an isolated pathology at C4-C5 with disc herniation and   resulting in neural foraminal narrowing to the left side. Given the   persistent pain and dysfunction despite those measures, an anterior   decompression and fusion now offered. Potential benefits and   complications reviewed. DESCRIPTION OF PROCEDURE: The patient was brought to the   room in supine position, a general anesthetic administered. Anterior   cervical region prepped and draped in normal fashion. Preoperative   antibiotics administered. Thigh-high SHOLA hose and sequential pumps   applied to the patient's lower extremities. A transverse incision was   made starting in the midline, extending to the border of the   sternocleidomastoid muscle. Sharp dissection carried down to the level   of platysma. The platysma, then split in line with its fibers,   exposing deep cervical fascia.  Deep cervical fascia incised along the   medial border of the sternocleidomastoid muscle and blunt finger   dissection carried down to the anterior aspect of the spine. X-ray taken   to verify the level. Longus colli muscle elevated. Retractor blades   placed. Bloomery pins placed into the body of C4-C5. Annulus incised   with a 15 blade. Using a series of curettes and pituitary rongeurs,   evacuated the entire disc, inclusive of the cartilaginous endplates. Midas Otis bur used to remove some of the osteophytes both anteriorly   and posteriorly. I eventually entered into the epidural space and   completed a central and neural foraminal decompression of the spinal   canal, mostly on the left side. Once completed, the dura was free. No   evidence of nerve root compression. A 5 x 7 mm graft chosen and   impacted into the interspace without difficulty once the endplates were   prepared using a bony rasp. The graft was impacted without difficulty   and excellent overall bony apposition. A 14 mm plate then secured to   the anterior surface of the spine using a series of 4 screws, each   measuring 4 mm in diameter and 12 mm in length with good purchase. These were locked to the plate using locking screws. The wound was   were irrigated. Drain was utilized. Platysma closed using 2-0 Vicryl. Skin approximated using running 3-0 Vicryl. The patient awoke from   the anesthetic, extubated, taken to recovery in stable condition.         MD MANISHA Taylor / HAILEE   D:  05/01/2017   19:49   T:  05/02/2017   05:56   Job #:  [id number]

## 2017-05-02 NOTE — PROGRESS NOTES
Care Management Interventions  PCP Verified by CM: Yes  Last Visit to PCP: 04/16/17  Mode of Transport at Discharge: Other (see comment) (spouse)  MyChart Signup: No  Discharge Durable Medical Equipment: No  Physical Therapy Consult: No  Occupational Therapy Consult: No  Speech Therapy Consult: No  Current Support Network: Lives with Spouse (Dr. Alejandro Yoo, [phone])  Confirm Follow Up Transport: Family  Plan discussed with Pt/Family/Caregiver: Yes  Freedom of Choice Offered: Yes  Discharge Location  Discharge Placement: Home with family assistance      Chart reviewed for transitions of care, discussed patient during rounds. Patient was admitted for a cervical fusion and is being discharged home today. Cm met with patient to explain role and offer support. Patient confirmed that she lives with her  and two adult children in their private home in [place]. Patient has Medicare insurance and recently changed PCP's to Dr. Alejandro Yoo (whom she last saw April 18th). Cm asked the care management specialist to make an appointment with her PCP.  will provide transportation home. No other needs at this time.   [employer] , [place]

## 2017-05-02 NOTE — DISCHARGE INSTRUCTIONS
Patient meets criteria for   BUNDLED PAYMENT   for Care Improvement Initiative Criteria    Contact Information for Orthopedic Nurse Navigator:      THIERNO Garibay, RN-BC  T:[phone]  H:[phone]  L:[phone]    [place] [facility] Fannie Arguelles MD  Will GUILLERMINA Cunningham PA-C  [phone]    After [address]:  Discharge Instructions Cervical (Neck) Spine Surgery     Patient Name Michelle Taveras)    Date of procedure: 5/1/17  Date of discharge: 5/2/17    Procedure (Procedure(s):   ANTERIOR CERVICAL FUSION C4-5 )    Surgeon (Surgeon(s) and Role:     * Elly De Leon MD - Primary)   PCP:    MAY RESTART ASPIRIN 5/6/17    Follow up appointments  -Follow up with Dr. Julieta Hawk in 2 weeks.   Call [phone] to make an appointment as soon as you get home from the hospital.    When to call your Orthopaedic Surgeon:  -Difficulty swallowing that is worse than when you left the hospital  -Signs of infection-if your incision is red; continues to have drainage; drainage has a foul odor or if you have a persistent fever over 101 degrees for 24 hours  -Nausea or vomiting, severe headache  -Changes in sensation in your arms or legs (numbness, tingling, loss of color)  -Increased weakness-greater than before your surgery  -Severe pain or pain not relieved by medications  -Signs of a blood clot in your leg-calf pain, tenderness, redness, swelling of lower leg    When to call your Primary Care Physician:  -Concerns about medical conditions such as diabetes, high blood pressure, asthma, congestive heart failure  -Call if blood sugars are elevated, persistent headache or dizziness, coughing or congestion, constipation or diarrhea, burning with urination, abnormal heart rate    When to call 911 and go to the nearest emergency room:  -Acute onset of chest pain, shortness of breath or difficulty breathing    Activity  -You are going home a well person, be as active as possible. Your only exercise should be walking. Start with short frequent walks and increase your walking distance each day.  -Limit the amount of time you sit to 20-30 minute intervals. Sitting for prolonged periods of time will be uncomfortable for you following surgery. - Do NOT lift anything over 10 pounds  -Do NOT do any neck exercises until you have been instructed by your doctor  -When you are in bed, you may lay on your back or on either side. Do NOT lie on your stomach      Cervical Collar (Aspen Collar)  -You are required to wear your cervical collar at all times; except while showering. You may remove the collar long enough to change the pads when needed and to change your dressing each day. -Do not bend or twist when your collar is off. It is best to have someone assist you when changing the pads and your dressing to prevent you from bending your neck. -Everyday, apply a clean set of pads to your collar. Use a mild soap and water to clean the pads. Pat them dry with a towel and lay out to air dry. Do not use heat to dry the pads. Diet  -Resume usual diet; drink plenty of fluids; eat foods high in fiber  -It is important to have regular bowel movements. Pain medications may cause constipation. You may want to take a stool softener (such as Senokot-S or Colace) to prevent constipation. If constipation occurs, take a laxative (such as Dulcolax tablets, Milk of Magnesia, or a suppository). Laxatives should only be used if the above preventable measures have failed and you still have not had a bowel moveme    Driving  -You may not drive or return to work until instructed by your physician. However, you may ride in the car for short periods of time. Incision Care  -You may take brief showers but do not run the water run directly onto the wound.   After showering or bathing, remove the wet dressing and gently blot the wound dry with a soft towel.  -Do not rub or apply any lotions or ointments to your incision site.   -Do not soak or scrub your wound  -Keep a dry dressing (ABD and paper tape) on your incision and have it changed daily for 6 days after surgery; more often if your incision is draining. Have your caregiver wash their hands thoroughly before changing your dressing.  -You will have absorbable sutures and steri-strips (white tape) on your incision. Leave the steri-strips on until they fall off. Showering  -You may shower in approximately 4 days after your surgery.    -Leave the dressing on during your shower. Do NOT allow the water to run directly onto your dressing. Once you get out of the shower, put on a dry dressing.  -Reminder- neck collars do not need to be worn while in the shower. Make sure you put clean dry pads on your collar after your shower.  -Do not take a tub bath. Preventing blood clots  -You have been given T.E.D. stockings to wear. Continue to wear these for 7 days after your discharge. Put them on in the morning and take them off at night.    -They are used to increase your circulation and prevent blood clots from forming in your legs  -T. E.D. stockings can be machine washed, temperature not to exceed 160° F (71°C) and machine dried for 15 to 20 minutes, temperature not to exceed 250° F (121°C). Pain management  -Take pain medication as prescribed; decrease the amount you use as your pain lessens  -Do not wait until you are in extreme pain to take your medication  -Avoid alcoholic beverages while taking pain medication    Pain Medication Safety  DO:  -Read the Medication Guide   -Take your medicine exactly as prescribed   -Store your medicine away from children and in a safe place   -Flush unused medicine down the toilet   -Call your healthcare provider for medical advice about side effects. You may report side effects to FDA at 7-327-FDA-7151.   -Please be aware that many medications contain Tylenol.   We do not want you to over medicate so please read the information below as a guide. Do not take more than 4 Grams of Tylenol in a 24 hour period. (There are 1000 milligrams in one Gram)                                                                                                                                                                                                                        Percocet contains 325 mg of Tylenol per tablet (do not take more than 12 tablets in 24 hours)  Norco contains 325 mg of Tylenol per tablet (do not take more than 12 tablets in 24 hours)  DO NOT:  -Do not give your medicine to others   -Do not take medicine unless it was prescribed for you   -Do not stop taking your medicine without talking to your healthcare provider   -Do not break, chew, crush, dissolve, or inject your medicine. If you cannot swallow your medicine whole, talk to your healthcare provider.  -Do not drink alcohol while taking this medicine  -Do not take anti-inflammatory medications unless instructed by your     physician.

## 2017-05-03 ENCOUNTER — PATIENT OUTREACH (OUTPATIENT)
Dept: FAMILY MEDICINE CLINIC | Age: 42
End: 2017-05-03

## 2017-05-03 NOTE — PROGRESS NOTES
Resolving current episode. Transitions of care complete. Per EMR review, patient had scheduled ortho surgery and has established care with Dr. Abi Elias at [facility]. . Patient has attended follow-up appointments as directed. There has been no outreach from patient to this writer.

## 2017-05-04 ENCOUNTER — TELEPHONE (OUTPATIENT)
Dept: MEDSURG UNIT | Age: 42
End: 2017-05-04

## 2017-05-04 NOTE — TELEPHONE ENCOUNTER
5/4 - called and spoke with patient in regards to her recovery at home S/P ACDF. States that she is having a \"horrible headache\". Doesn't get better when she lays down, isn't worse when she's mobile. Patient believes that it is due to her Aspen collar. I have advised that she calls Dr. Bandar Hernandez office, and I will also notify his nurse, Yesenia. Confirmed that patient is aware of her appointments with her PCP and the sleep clinic which is intends on keeping and going to.

## 2017-05-05 RX ORDER — GLIMEPIRIDE 2 MG/1
2 TABLET ORAL 2 TIMES DAILY
Qty: 60 TAB | Refills: 0 | Status: SHIPPED | OUTPATIENT
Start: 2017-05-05 | End: 2017-06-09 | Stop reason: SDUPTHER

## 2017-05-09 ENCOUNTER — OFFICE VISIT (OUTPATIENT)
Dept: FAMILY MEDICINE CLINIC | Age: 42
End: 2017-05-09

## 2017-05-09 ENCOUNTER — TELEPHONE (OUTPATIENT)
Dept: FAMILY MEDICINE CLINIC | Age: 42
End: 2017-05-09

## 2017-05-09 VITALS
RESPIRATION RATE: 16 BRPM | TEMPERATURE: 97.9 F | WEIGHT: 242.8 LBS | BODY MASS INDEX: 40.45 KG/M2 | HEIGHT: 65 IN | SYSTOLIC BLOOD PRESSURE: 102 MMHG | OXYGEN SATURATION: 96 % | HEART RATE: 90 BPM | DIASTOLIC BLOOD PRESSURE: 70 MMHG

## 2017-05-09 DIAGNOSIS — E66.01 OBESITY, MORBID, BMI 40.0-49.9 (HCC): ICD-10-CM

## 2017-05-09 DIAGNOSIS — E53.8 VITAMIN B12 DEFICIENCY: ICD-10-CM

## 2017-05-09 DIAGNOSIS — L08.9 PUSTULE: ICD-10-CM

## 2017-05-09 DIAGNOSIS — E11.9 WELL CONTROLLED TYPE 2 DIABETES MELLITUS (HCC): Primary | ICD-10-CM

## 2017-05-09 RX ORDER — POTASSIUM CHLORIDE 1500 MG/1
TABLET, FILM COATED, EXTENDED RELEASE ORAL
Refills: 2 | COMMUNITY
Start: 2017-04-18 | End: 2017-10-04 | Stop reason: SDUPTHER

## 2017-05-09 RX ORDER — CYANOCOBALAMIN 1000 UG/ML
1000 INJECTION, SOLUTION INTRAMUSCULAR; SUBCUTANEOUS ONCE
Qty: 1 VIAL | Refills: 6
Start: 2017-05-09 | End: 2017-05-09

## 2017-05-09 RX ORDER — MUPIROCIN 20 MG/G
OINTMENT TOPICAL DAILY
Qty: 22 G | Refills: 0 | Status: SHIPPED | OUTPATIENT
Start: 2017-05-09 | End: 2017-10-04

## 2017-05-09 NOTE — PROGRESS NOTES
Chief Complaint   Patient presents with   [facility] Follow Up     she is a 46y.o. year old female who presents for evalution. She has a hard collar on post op cspine stenosis repair. She has 3 more weeks in this collar. She is asking for refills on some meds. Reviewed PmHx, RxHx, FmHx, SocHx, AllgHx and updated and dated in the chart. Patient Active Problem List    Diagnosis    Cervical stenosis of spine    Chronic neck pain    SONY (obstructive sleep apnea)    DDD (degenerative disc disease), cervical     followed by ortho      Arthritis     lower back      Asthma     last attack 1994      Bipolar 1 disorder ([address]    GERD (gastroesophageal reflux disease)    High cholesterol    HTN, goal below 140/90    Migraines    Morbid obesity ([address]    Unspecified sleep apnea     no cpap-NEVER TESTED      Diabetes mellitus type 2, controlled ([address]     a1c 7.4% at outside EMR         Nurse notes were reviewed and copied and are correct  Review of Systems - negative except as listed above in the HPI    Objective:     Vitals:    05/09/17 1326   BP: 102/70   Pulse: 90   Resp: 16   Temp: 97.9 °F (36.6 °C)   TempSrc: Oral   SpO2: 96%   Weight: 242 lb 12.8 oz (110.1 kg)   Height: 5' 5\" (1.651 m)        Physical Examination: General appearance - alert, well appearing, and in no distress  Mental status - alert, oriented to person, place, and time  Lymphatics - no palpable lymphadenopathy, no hepatosplenomegaly  Chest - clear to auscultation, no wheezes, rales or rhonchi, symmetric air entry  Heart - normal rate, regular rhythm, normal S1, S2, no murmurs, rubs, clicks or gallops  Breasts - breasts appear normal, no suspicious masses, no skin or nipple changes or axillary nodes  Skin: raised papule under the left breast    Assessment/ Plan:   Irasema Shearer was seen today for hospital follow up.     Diagnoses and all orders for this visit:    Well controlled type 2 diabetes mellitus ([address]    Obesity, morbid, BMI 40.0-49.9 ([facility]    Vitamin B12 deficiency  -     cyanocobalamin (VITAMIN B12) 1,000 mcg/mL injection; 1 mL by IntraMUSCular route once for 1 dose. EVERY OTHER WEEK  -     CBC WITH AUTOMATED DIFF  -     VITAMIN B12  -     REFERRAL TO GASTROENTEROLOGY    Pustule  -     mupirocin (BACTROBAN) 2 % ointment; Apply  to affected area daily. Follow-up Disposition:  Return in about 1 month (around 6/9/2017). ICD-10-CM ICD-9-CM    1. Well controlled type 2 diabetes mellitus ([facility] E11.9 250.00    2. Obesity, morbid, BMI 40.0-49.9 ([facility]) E66.01 278.01    3. Vitamin B12 deficiency E53.8 266.2 cyanocobalamin (VITAMIN B12) 1,000 mcg/mL injection      CBC WITH AUTOMATED DIFF      VITAMIN B12      REFERRAL TO GASTROENTEROLOGY   4. Pustule L08.9 686.9 mupirocin (BACTROBAN) 2 % ointment       I have discussed the diagnosis with the patient and the intended plan as seen in the above orders. The patient has received an after-visit summary and questions were answered concerning future plans. Medication Side Effects and Warnings were discussed with patient: yes  Patient Labs were reviewed and or requested: yes    Patient Past Records were reviewed and or requested: yes        Patient Instructions        Diabetes Foot Health: Care Instructions  Your Care Instructions    When you have diabetes, your feet need extra care and attention. Diabetes can damage the nerve endings and blood vessels in your feet, making you less likely to notice when your feet are injured. Diabetes also limits your body's ability to fight infection and get blood to areas that need it. If you get a minor foot injury, it could become an ulcer or a serious infection. With good foot care, you can prevent most of these problems. Caring for your feet can be quick and easy. Most of the care can be done when you are bathing or getting ready for bed. Follow-up care is a key part of your treatment and safety.  Be sure to make and go to all appointments, and call your doctor if you are having problems. Its also a good idea to know your test results and keep a list of the medicines you take. How can you care for yourself at home? · Keep your blood sugar close to normal by watching what and how much you eat, monitoring blood sugar, taking medicines if prescribed, and getting regular exercise. · Do not smoke. Smoking affects blood flow and can make foot problems worse. If you need help quitting, talk to your doctor about stop-smoking programs and medicines. These can increase your chances of quitting for good. · Eat a diet that is low in fats. High fat intake can cause fat to build up in your blood vessels and decrease blood flow. · Inspect your feet daily for blisters, cuts, cracks, or sores. If you cannot see well, use a mirror or have someone help you. · Take care of your feet:  [facility] your feet every day. Use warm (not hot) water. Check the water temperature with your wrists or other part of your body, not your feet. ¨ Dry your feet well. Pat them dry. Do not rub the skin on your feet too hard. Dry well between your toes. If the skin on your feet stays moist, bacteria or a fungus can grow, which can lead to infection. ¨ Keep your skin soft. Use moisturizing skin cream to keep the skin on your feet soft and prevent calluses and cracks. But do not put the cream between your toes, and stop using any cream that causes a rash. ¨ Clean underneath your toenails carefully. Do not use a sharp object to clean underneath your toenails. Use the blunt end of a nail file or other rounded tool. ¨ Trim and file your toenails straight across to prevent ingrown toenails. Use a nail clipper, not scissors. Use an emery board to smooth the edges. · Change socks daily. Socks without seams are best, because seams often rub the feet. You can find socks for people with diabetes from specialty catalogs.   · Look inside your shoes every day for things like gravel or torn linings, which could cause blisters or sores. · Buy shoes that fit well:  ¨ Look for shoes that have plenty of space around the toes. This helps prevent bunions and blisters. ¨ Try on shoes while wearing the kind of socks you will usually wear with the shoes. ¨ Avoid plastic shoes. They may rub your feet and cause blisters. Good shoes should be made of materials that are flexible and breathable, such as leather or cloth. ¨ Break in new shoes slowly by wearing them for no more than an hour a day for several days. Take extra time to check your feet for red areas, blisters, or other problems after you wear new shoes. · Do not go barefoot. Do not wear sandals, and do not wear shoes with very thin soles. Thin soles are easy to puncture. They also do not protect your feet from hot pavement or cold weather. · Have your doctor check your feet during each visit. If you have a foot problem, see your doctor. Do not try to treat an early foot problem at home. Home remedies or treatments that you can buy without a prescription (such as corn removers) can be harmful. · Always get early treatment for foot problems. A minor irritation can lead to a major problem if not properly cared for early. When should you call for help? Call your doctor now or seek immediate medical care if:  · You have a foot sore, an ulcer or break in the skin that is not healing after 4 days, bleeding corns or calluses, or an ingrown toenail. · You have blue or black areas, which can mean bruising or blood flow problems. · You have peeling skin or tiny blisters between your toes or cracking or oozing of the skin. · You have a fever for more than 24 hours and a foot sore. · You have new numbness or tingling in your feet that does not go away after you move your feet or change positions. · You have unexplained or unusual swelling of the foot or ankle.   Watch closely for changes in your health, and be sure to contact your doctor if:  · You cannot do proper foot care.  Where can you learn more? Go to http://marissa-marjan.info/. Enter A739 in the search box to learn more about \"Diabetes Foot Health: Care Instructions. \"  Current as of: May 23, 2016  Content Version: 11.2  © 2848-1694 [employer]. Care instructions adapted under license by [employer] (which disclaims liability or warranty for this information). If you have questions about a medical condition or this instruction, always ask your healthcare professional. [place] [address] any warranty or liability for your use of this information.         The patient verbalizes understanding and agrees with the plan of care        Patient has the advanced directives booklet to review

## 2017-05-09 NOTE — PATIENT INSTRUCTIONS
Diabetes Foot Health: Care Instructions  Your Care Instructions    When you have diabetes, your feet need extra care and attention. Diabetes can damage the nerve endings and blood vessels in your feet, making you less likely to notice when your feet are injured. Diabetes also limits your body's ability to fight infection and get blood to areas that need it. If you get a minor foot injury, it could become an ulcer or a serious infection. With good foot care, you can prevent most of these problems. Caring for your feet can be quick and easy. Most of the care can be done when you are bathing or getting ready for bed. Follow-up care is a key part of your treatment and safety. Be sure to make and go to all appointments, and call your doctor if you are having problems. Its also a good idea to know your test results and keep a list of the medicines you take. How can you care for yourself at home? · Keep your blood sugar close to normal by watching what and how much you eat, monitoring blood sugar, taking medicines if prescribed, and getting regular exercise. · Do not smoke. Smoking affects blood flow and can make foot problems worse. If you need help quitting, talk to your doctor about stop-smoking programs and medicines. These can increase your chances of quitting for good. · Eat a diet that is low in fats. High fat intake can cause fat to build up in your blood vessels and decrease blood flow. · Inspect your feet daily for blisters, cuts, cracks, or sores. If you cannot see well, use a mirror or have someone help you. · Take care of your feet:  [facility] your feet every day. Use warm (not hot) water. Check the water temperature with your wrists or other part of your body, not your feet. ¨ Dry your feet well. Pat them dry. Do not rub the skin on your feet too hard. Dry well between your toes. If the skin on your feet stays moist, bacteria or a fungus can grow, which can lead to infection.   ¨ Keep your skin soft. Use moisturizing skin cream to keep the skin on your feet soft and prevent calluses and cracks. But do not put the cream between your toes, and stop using any cream that causes a rash. ¨ Clean underneath your toenails carefully. Do not use a sharp object to clean underneath your toenails. Use the blunt end of a nail file or other rounded tool. ¨ Trim and file your toenails straight across to prevent ingrown toenails. Use a nail clipper, not scissors. Use an emery board to smooth the edges. · Change socks daily. Socks without seams are best, because seams often rub the feet. You can find socks for people with diabetes from specialty catalogs. · Look inside your shoes every day for things like gravel or torn linings, which could cause blisters or sores. · Buy shoes that fit well:  ¨ Look for shoes that have plenty of space around the toes. This helps prevent bunions and blisters. ¨ Try on shoes while wearing the kind of socks you will usually wear with the shoes. ¨ Avoid plastic shoes. They may rub your feet and cause blisters. Good shoes should be made of materials that are flexible and breathable, such as leather or cloth. ¨ Break in new shoes slowly by wearing them for no more than an hour a day for several days. Take extra time to check your feet for red areas, blisters, or other problems after you wear new shoes. · Do not go barefoot. Do not wear sandals, and do not wear shoes with very thin soles. Thin soles are easy to puncture. They also do not protect your feet from hot pavement or cold weather. · Have your doctor check your feet during each visit. If you have a foot problem, see your doctor. Do not try to treat an early foot problem at home. Home remedies or treatments that you can buy without a prescription (such as corn removers) can be harmful. · Always get early treatment for foot problems. A minor irritation can lead to a major problem if not properly cared for early.   When should you call for help? Call your doctor now or seek immediate medical care if:  · You have a foot sore, an ulcer or break in the skin that is not healing after 4 days, bleeding corns or calluses, or an ingrown toenail. · You have blue or black areas, which can mean bruising or blood flow problems. · You have peeling skin or tiny blisters between your toes or cracking or oozing of the skin. · You have a fever for more than 24 hours and a foot sore. · You have new numbness or tingling in your feet that does not go away after you move your feet or change positions. · You have unexplained or unusual swelling of the foot or ankle. Watch closely for changes in your health, and be sure to contact your doctor if:  · You cannot do proper foot care. Where can you learn more? Go to http://marissa-marjan.info/. Enter A739 in the search box to learn more about \"Diabetes Foot Health: Care Instructions. \"  Current as of: May 23, 2016  Content Version: 11.2  © 4992-0998 [employer]. Care instructions adapted under license by [employer] (which disclaims liability or warranty for this information). If you have questions about a medical condition or this instruction, always ask your healthcare professional. [address] any warranty or liability for your use of this information.

## 2017-05-09 NOTE — TELEPHONE ENCOUNTER
Message from Dr. Claudean Poag regarding glimepiride given to patient at [address] Patient states that she has been taking her medication this way for years.

## 2017-05-09 NOTE — TELEPHONE ENCOUNTER
----- Message from Tiesha Osullivan MD sent at 5/5/2017  4:56 PM EDT -----  Please call and tell her it is best to split the pills to take one tab bid to cover all 24 hours. Taking 2 at night might make the blood sugar drop too low at night.

## 2017-05-09 NOTE — MR AVS SNAPSHOT
Visit Information Date & Time Provider Department Dept. Phone Encounter #  
 5/9/2017  1:30 PM Flora Sanchez MD [facility] [id number] Follow-up Instructions Return in about 1 month (around 6/9/2017). Your Appointments 5/30/2017 10:20 AM  
Any with Alena Hogan MD  
[facility] [facility] ([address]) Appt Note: 1st adherence, bring machine [address] [facility] [address] [facility] [address]  
  
   
 [address] [facility] [address] [facility] [id number] 7/10/2017  9:45 AM  
ROUTINE CARE with Babar Darby MD  
[facility]) Appt Note: 3 months f/u appt  
 [address] [phone]  
[phone]  
  
   
 [address] [phone] Upcoming Health Maintenance Date Due  
 EYE EXAM RETINAL OR DILATED Q1 9/27/1987 MEDICARE YEARLY EXAM 9/27/1995 Pneumococcal 19-64 Medium Risk (1 of 1 - PPSV23) 9/27/1996 PAP AKA CERVICAL CYTOLOGY 9/27/1998 MICROALBUMIN Q1 2/26/2017 FOOT EXAM Q1 5/26/2017 INFLUENZA AGE 9 TO ADULT 8/1/2017 HEMOGLOBIN A1C Q6M 10/7/2017 LIPID PANEL Q1 11/11/2017 DTaP/Tdap/Td series (2 - Td) 3/10/2024 Allergies as of 5/9/2017  Review Complete On: 5/9/2017 By: [place] Severity Noted Reaction Type Reactions Pcn [Penicillins] High 05/03/2010    Swelling Swelling throat Shellfish Containing Products High 03/06/2011    Swelling Swells throat and eyes Current Immunizations  Reviewed on 5/26/2016 Name Date Tdap 3/10/2014  5:21 PM  
  
 Not reviewed this visit You Were Diagnosed With   
  
 Codes Comments Well controlled type 2 diabetes mellitus ([facility] [address]    -  Primary ICD-10-CM: E11.9 ICD-9-CM: 250.00 Obesity, morbid, BMI 40.0-49.9 (HCC)     ICD-10-CM: E66.01 
ICD-9-CM: 278.01 Vitamin B12 deficiency     ICD-10-CM: E53.8 ICD-9-CM: 266.2 Pustule     ICD-10-CM: L08.9 ICD-9-CM: 854. 9 Vitals BP Pulse Temp Resp Height(growth percentile) Weight(growth percentile) 102/70 (BP 1 Location: Right arm, BP Patient Position: Sitting) 90 97.9 °F (36.6 °C) (Oral) 16 5' 5\" (1.651 m) 242 lb 12.8 oz (110.1 kg) SpO2 BMI OB Status Smoking Status 96% 40.4 kg/m2 Hysterectomy Never Smoker BMI and BSA Data Body Mass Index Body Surface Area 40.4 kg/m 2 2.25 m 2 Preferred Pharmacy Pharmacy Name Phone [id number]- [place], [place] - [address] [phone] Your Updated Medication List  
  
   
This list is accurate as of: 5/9/17  2:08 PM.  Always use your most recent med list.  
  
  
  
  
 atenolol 25 mg tablet Commonly known as:  TENORMIN  
take 1 tablet by mouth once daily BD LUER-MARIELY SYRINGE 3 mL 25 gauge x 1\" Syrg Generic drug:  Syringe with Needle (Disp) USE TO INJECT 1 ML EVERY 2 WEEKS  
  
 cyanocobalamin 1,000 mcg/mL injection Commonly known as:  VITAMIN B12  
1 mL by IntraMUSCular route once for 1 dose. EVERY OTHER WEEK  
  
 cyclobenzaprine 10 mg tablet Commonly known as:  FLEXERIL  
take 1 tablet by mouth three times a day if needed for muscle spasm FISH OIL PO Take  by mouth daily. furosemide 40 mg tablet Commonly known as:  LASIX  
take 1 tablet by mouth once daily  
  
 glimepiride 2 mg tablet Commonly known as:  AMARYL Take 1 Tab by mouth two (2) times a day. glucose blood VI test strips strip Commonly known as:  Ascensia CONTOUR  
E11.69 Test blood sugar 3 times a day HAIR,SKIN AND NAILS PO Take  by mouth daily. insulin glargine 100 unit/mL (3 mL) pen Commonly known as:  LANTUS SOLOSTAR INJECT 30 UNITS DAILY Lancets Misc E11.69  
  
 linaclotide 145 mcg Cap capsule Commonly known as:  Luz Chávez Take 1 Cap by mouth Daily (before breakfast). loratadine 10 mg tablet Commonly known as:  Clorinda Deer Take 1 Tab by mouth nightly. metFORMIN  mg tablet Commonly known as:  GLUCOPHAGE XR  
take 1 tablet by mouth once daily  
  
 montelukast 10 mg tablet Commonly known as:  SINGULAIR  
take 1 tablet by mouth once daily MULTIVITAMIN PO Take  by mouth daily. mupirocin 2 % ointment Commonly known as:  [employer] Apply  to affected area daily. NYSTOP powder Generic drug:  nystatin  
two (2) times a day. oxyCODONE IR 5 mg immediate release tablet Commonly known as:  Alicia Bunk Take 1-2 Tabs by mouth every four (4) hours as needed. Max Daily Amount: 60 mg.  
  
 phentermine 37.5 mg tablet Commonly known as:  ADIPEX-P Take 1 Tab by mouth every morning. Max Daily Amount: 37.5 mg.  
  
 polyethylene glycol 17 gram packet Commonly known as:  Bettylou Dolores Take 1 Packet by mouth daily. * potassium chloride 20 mEq tablet Commonly known as:  K-DUR, KLOR-CON  
take 1 tablet by mouth once daily * potassium chloride SR 20 mEq tablet Commonly known as:  K-TAB  
TAKE ONE TABLET BY MOUTH DAILY pravastatin 40 mg tablet Commonly known as:  PRAVACHOL Take 1 Tab by mouth nightly. CHANGE IN THERAPY topiramate 25 mg tablet Commonly known as:  TOPAMAX Take 1 Tab by mouth two (2) times daily (with meals). traZODone 100 mg tablet Commonly known as:  DESYREL  
take 1 tablet by mouth at bedtime VITAMIN D3 2,000 unit Cap capsule Generic drug:  Cholecalciferol (Vitamin D3)  
take 1 capsule by mouth once daily VITAMIN E PO Take  by mouth daily. * Notice: This list has 2 medication(s) that are the same as other medications prescribed for you. Read the directions carefully, and ask your doctor or other care provider to review them with you. Prescriptions Sent to Pharmacy Refills  
 mupirocin (BACTROBAN) 2 % ointment 0 Sig: Apply  to affected area daily. Class: Normal  
 Pharmacy: [facility] [address] [facility] [address]  #: [phone] Route: Topical  
  
We Performed the Following CBC WITH AUTOMATED DIFF [82519 CPT(R)] REFERRAL TO GASTROENTEROLOGY [KHS30 Custom] Comments:  
 Please evaluate patient for gives h/o vit b 12 deficiency. Need to evaluate for malabsorption. Question: does the patient need continued vit b12 injections VITAMIN B12 [id number] CPT(R)] Follow-up Instructions Return in about 1 month (around 6/9/2017). Referral Information Referral ID Referred By Referred To  
  
 [id number] Nadine RENAE   
   [address] [place] [address]  [place], [address] [facility] Visits Status Start Date End Date 1 New Request 5/9/17 5/9/18 If your referral has a status of pending review or denied, additional information will be sent to support the outcome of this decision. Patient Instructions Diabetes Foot Health: Care Instructions Your Care Instructions When you have diabetes, your feet need extra care and attention. Diabetes can damage the nerve endings and blood vessels in your feet, making you less likely to notice when your feet are injured. Diabetes also limits your body's ability to fight infection and get blood to areas that need it. If you get a minor foot injury, it could become an ulcer or a serious infection. With good foot care, you can prevent most of these problems. Caring for your feet can be quick and easy. Most of the care can be done when you are bathing or getting ready for bed. Follow-up care is a key part of your treatment and safety. Be sure to make and go to all appointments, and call your doctor if you are having problems. Its also a good idea to know your test results and keep a list of the medicines you take. How can you care for yourself at home? · Keep your blood sugar close to normal by watching what and how much you eat, monitoring blood sugar, taking medicines if prescribed, and getting regular exercise. · Do not smoke. Smoking affects blood flow and can make foot problems worse. If you need help quitting, talk to your doctor about stop-smoking programs and medicines. These can increase your chances of quitting for good. · Eat a diet that is low in fats. High fat intake can cause fat to build up in your blood vessels and decrease blood flow. · Inspect your feet daily for blisters, cuts, cracks, or sores. If you cannot see well, use a mirror or have someone help you. · Take care of your feet: 
[facility] your feet every day. Use warm (not hot) water. Check the water temperature with your wrists or other part of your body, not your feet. ¨ Dry your feet well. Pat them dry. Do not rub the skin on your feet too hard. Dry well between your toes. If the skin on your feet stays moist, bacteria or a fungus can grow, which can lead to infection. ¨ Keep your skin soft. Use moisturizing skin cream to keep the skin on your feet soft and prevent calluses and cracks. But do not put the cream between your toes, and stop using any cream that causes a rash. ¨ Clean underneath your toenails carefully. Do not use a sharp object to clean underneath your toenails. Use the blunt end of a nail file or other rounded tool. ¨ Trim and file your toenails straight across to prevent ingrown toenails. Use a nail clipper, not scissors. Use an emery board to smooth the edges. · Change socks daily. Socks without seams are best, because seams often rub the feet. You can find socks for people with diabetes from specialty catalogs. · Look inside your shoes every day for things like gravel or torn linings, which could cause blisters or sores. · Buy shoes that fit well: 
¨ Look for shoes that have plenty of space around the toes. This helps prevent bunions and blisters. ¨ Try on shoes while wearing the kind of socks you will usually wear with the shoes. ¨ Avoid plastic shoes. They may rub your feet and cause blisters. Good shoes should be made of materials that are flexible and breathable, such as leather or cloth. ¨ Break in new shoes slowly by wearing them for no more than an hour a day for several days. Take extra time to check your feet for red areas, blisters, or other problems after you wear new shoes. · Do not go barefoot. Do not wear sandals, and do not wear shoes with very thin soles. Thin soles are easy to puncture. They also do not protect your feet from hot pavement or cold weather. · Have your doctor check your feet during each visit. If you have a foot problem, see your doctor. Do not try to treat an early foot problem at home. Home remedies or treatments that you can buy without a prescription (such as corn removers) can be harmful. · Always get early treatment for foot problems. A minor irritation can lead to a major problem if not properly cared for early. When should you call for help? Call your doctor now or seek immediate medical care if: 
· You have a foot sore, an ulcer or break in the skin that is not healing after 4 days, bleeding corns or calluses, or an ingrown toenail. · You have blue or black areas, which can mean bruising or blood flow problems. · You have peeling skin or tiny blisters between your toes or cracking or oozing of the skin. · You have a fever for more than 24 hours and a foot sore. · You have new numbness or tingling in your feet that does not go away after you move your feet or change positions. · You have unexplained or unusual swelling of the foot or ankle. Watch closely for changes in your health, and be sure to contact your doctor if: 
· You cannot do proper foot care. Where can you learn more? Go to http://marissa-marjan.info/.  
Enter A701 in the search box to learn more about \"Diabetes Foot Health: Care Instructions. \" Current as of: May 23, 2016 Content Version: 11.2 © 6124-0486 [employer]. Care instructions adapted under license by [employer] (which disclaims liability or warranty for this information). If you have questions about a medical condition or this instruction, always ask your healthcare professional. [address]  any warranty or liability for your use of this information. Introducing [facility]! Molly Medina introduces [employer] patient portal. Now you can access parts of your medical record, email your doctor's office, and request medication refills online. 1. In your internet browser, go to [email or web address][email or web address] 2. Click on the First Time User? Click Here link in the Sign In box. You will see the New Member Sign Up page. 3. Enter your [employer] Access Code exactly as it appears below. You will not need to use this code after youve completed the sign-up process. If you do not sign up before the expiration date, you must request a new code. · [employer] Access Code: [email or web address] Expires: 5/21/2017 12:44 PM 
 
4. Enter the last four digits of your Social Security Number (xxxx) and Date of Birth (mm/dd/yyyy) as indicated and click Submit. You will be taken to the next sign-up page. 5. Create a [employer] ID. This will be your [employer] login ID and cannot be changed, so think of one that is secure and easy to remember. 6. Create a [employer] password. You can change your password at any time. 7. Enter your Password Reset Question and Answer. This can be used at a later time if you forget your password. 8. Enter your e-mail address. You will receive e-mail notification when new information is available in [address] 9. Click Sign Up. You can now view and download portions of your medical record. 10. Click the Download Summary menu link to download a portable copy of your medical information. If you have questions, please visit the Frequently Asked Questions section of the [employer] website. Remember, [employer] is NOT to be used for urgent needs. For medical emergencies, dial 911. Now available from your iPhone and Android! Please provide this summary of care documentation to your next provider. Your primary care clinician is listed as Wanda Diaz. If you have any questions after today's visit, please call [phone].

## 2017-05-09 NOTE — PROGRESS NOTES
1. Have you been to the ER, urgent care clinic since your last visit? Hospitalized since your last visit? No    2. Have you seen or consulted any other health care providers outside of the [address] [place] [address] since your last visit? Include any pap smears or colon screening.  No     Chief Complaint   Patient presents with   [facility] Follow Up

## 2017-05-10 LAB
BASOPHILS # BLD AUTO: 0 X10E3/UL (ref 0–0.2)
BASOPHILS NFR BLD AUTO: 0 %
EOSINOPHIL # BLD AUTO: 0.1 X10E3/UL (ref 0–0.4)
EOSINOPHIL NFR BLD AUTO: 1 %
ERYTHROCYTE [DISTWIDTH] IN BLOOD BY AUTOMATED COUNT: 13.7 % (ref 12.3–15.4)
HCT VFR BLD AUTO: 37.2 % (ref 34–46.6)
HGB BLD-MCNC: 12.4 G/DL (ref 11.1–15.9)
IMM GRANULOCYTES # BLD: 0 X10E3/UL (ref 0–0.1)
IMM GRANULOCYTES NFR BLD: 0 %
LYMPHOCYTES # BLD AUTO: 2.7 X10E3/UL (ref 0.7–3.1)
LYMPHOCYTES NFR BLD AUTO: 32 %
MCH RBC QN AUTO: 28.7 PG (ref 26.6–33)
MCHC RBC AUTO-ENTMCNC: 33.3 G/DL (ref 31.5–35.7)
MCV RBC AUTO: 86 FL (ref 79–97)
MONOCYTES # BLD AUTO: 0.6 X10E3/UL (ref 0.1–0.9)
MONOCYTES NFR BLD AUTO: 7 %
NEUTROPHILS # BLD AUTO: 5.1 X10E3/UL (ref 1.4–7)
NEUTROPHILS NFR BLD AUTO: 60 %
PLATELET # BLD AUTO: 311 X10E3/UL (ref 150–379)
RBC # BLD AUTO: 4.32 X10E6/UL (ref 3.77–5.28)
VIT B12 SERPL-MCNC: 611 PG/ML (ref 211–946)
WBC # BLD AUTO: 8.5 X10E3/UL (ref 3.4–10.8)

## 2017-05-17 ENCOUNTER — DOCUMENTATION ONLY (OUTPATIENT)
Dept: SLEEP MEDICINE | Age: 42
End: 2017-05-17

## 2017-05-17 NOTE — PROGRESS NOTES
Patients 1st adherence is scheduled for 5/30. Patient showed up to appointment today with no doctors in the office. Patient admits to not getting a machine due to it costing her $65 per [facility]. Advised we would notify Dr. Paul Aponte. She also came to what she thought was her appointment today with severe back pain from what she states was due to a recent spinal surgery yesterday. Iliana Cortez called volunteer to assist her down. Patient stated she was beginning to feel dizzy and nauseous. Fifi Browning asked patient if she wanted us to call 911 and suggested she visit the ER. Patient declined and volunteer wheeled her out of our office.

## 2017-06-02 RX ORDER — DICLOFENAC SODIUM 75 MG/1
TABLET, DELAYED RELEASE ORAL
Qty: 60 TAB | Refills: 5 | Status: ON HOLD | OUTPATIENT
Start: 2017-06-02 | End: 2017-11-07

## 2017-06-02 NOTE — TELEPHONE ENCOUNTER
Received faxed refill request for this medication from the pharmacy that is on file.     diclofenac EC (VOLTAREN) 75 mg EC tablet  Normal, Disp-60 Tab, R-5

## 2017-06-09 ENCOUNTER — OFFICE VISIT (OUTPATIENT)
Dept: FAMILY MEDICINE CLINIC | Age: 42
End: 2017-06-09

## 2017-06-09 VITALS
OXYGEN SATURATION: 98 % | RESPIRATION RATE: 16 BRPM | DIASTOLIC BLOOD PRESSURE: 65 MMHG | WEIGHT: 240.8 LBS | HEIGHT: 65 IN | TEMPERATURE: 97.9 F | BODY MASS INDEX: 40.12 KG/M2 | HEART RATE: 81 BPM | SYSTOLIC BLOOD PRESSURE: 95 MMHG

## 2017-06-09 DIAGNOSIS — Z00.00 ROUTINE GENERAL MEDICAL EXAMINATION AT A HEALTH CARE FACILITY: Primary | ICD-10-CM

## 2017-06-09 DIAGNOSIS — Z13.39 SCREENING FOR ALCOHOLISM: ICD-10-CM

## 2017-06-09 DIAGNOSIS — E11.9 WELL CONTROLLED TYPE 2 DIABETES MELLITUS (HCC): ICD-10-CM

## 2017-06-09 DIAGNOSIS — Z71.89 ADVANCED DIRECTIVES, COUNSELING/DISCUSSION: ICD-10-CM

## 2017-06-09 RX ORDER — GLIMEPIRIDE 2 MG/1
TABLET ORAL
Qty: 60 TAB | Refills: 0 | Status: SHIPPED | OUTPATIENT
Start: 2017-06-09 | End: 2017-07-16 | Stop reason: SDUPTHER

## 2017-06-09 NOTE — ACP (ADVANCE CARE PLANNING)
Advance Care Planning    Advance Care Planning (ACP) Provider Conversation Snapshot    Date of ACP Conversation: 06/11/17  Persons included in Conversation:  patient  Length of ACP Conversation in minutes:  16 minutes    Authorized Decision Maker (if patient is incapable of making informed decisions): This person is:    Other Legally Authorized Decision Maker (e.g. Next of Kin)          For Patients with Decision Making Capacity:   Values/Goals: Exploration of values, goals, and preferences if recovery is not expected, even with continued medical treatment in the event of:  Imminent death  Severe, permanent brain injury    Conversation Outcomes / Follow-Up Plan:   Completed new Advance Directive

## 2017-06-09 NOTE — PROGRESS NOTES
Demetrius Riddle is a 46 y.o. female  Chief Complaint   Patient presents with   [address] Follow Up     6 wks post op c4- c5 fusion      1. Have you been to the ER, urgent care clinic since your last visit? Hospitalized since your last visit? No    2. Have you seen or consulted any other health care providers outside of the [address] [place] [address] since your last visit? Include any pap smears or colon screening.   No

## 2017-06-09 NOTE — PROGRESS NOTES
This is a Subsequent Medicare Annual Wellness Visit providing Personalized Prevention Plan Services (PPPS) (Performed 12 months after initial AWV and PPPS )    I have reviewed the patient's medical history in detail and updated the computerized patient record. History     Past Medical History:   Diagnosis Date    Arthritis     lower back    Asthma     last attack     Bipolar 1 disorder ([facility]     Chronic neck pain     DDD (degenerative disc disease), cervical     followed by ortho    Diabetes mellitus type 2, controlled ([facility]     GERD (gastroesophageal reflux disease)     High cholesterol     HTN, goal below 140/90     Migraines     Morbid obesity ([facility]     SONY (obstructive sleep apnea)       Past Surgical History:   Procedure Laterality Date    BREAST SURGERY PROCEDURE UNLISTED  13     RIGHT BREAST EXCISION OF MULTIPLE SEBACEOUS CYSTS    HX BREAST BIOPSY  2014    LEFT BREAST EXCISION SEBACEOUS CYST, RIGHT CHEST WALL SEBACEOUS CYST EXCISION performed by Eleanore Landau, MD at [facility] MAIN OR    HX  SECTION      HX HEENT      wisdom teeth    HX OTHER SURGICAL      spinal tap - viral menigitis    HX OTHER SURGICAL      SEBACEOUS CYST-BACK    HX TOTAL LAPAROSCOPIC HYSTERECTOMY W/ BS&O       Current Outpatient Prescriptions   Medication Sig Dispense Refill    pravastatin (PRAVACHOL) 40 mg tablet Take 1 Tab by mouth nightly. 90 Tab 0    diclofenac EC (VOLTAREN) 75 mg EC tablet take 1 tablet by mouth twice a day 60 Tab 5    mupirocin (BACTROBAN) 2 % ointment Apply  to affected area daily. 22 g 0    oxyCODONE IR (ROXICODONE) 5 mg immediate release tablet Take 1-2 Tabs by mouth every four (4) hours as needed. Max Daily Amount: 60 mg. (Patient taking differently: Take 10 mg by mouth every four (4) hours as needed.) 60 Tab 0    loratadine (CLARITIN) 10 mg tablet Take 1 Tab by mouth nightly.  30 Tab 6    linaclotide (LINZESS) 145 mcg cap capsule Take 1 Cap by mouth Daily (before breakfast). 30 Cap 2    topiramate (TOPAMAX) 25 mg tablet Take 1 Tab by mouth two (2) times daily (with meals). 60 Tab 6    glucose blood VI test strips (ASCENSIA CONTOUR) strip E11.69  Test blood sugar 3 times a day 100 Strip 3    Lancets misc E11.69 100 Each 3    cyclobenzaprine (FLEXERIL) 10 mg tablet take 1 tablet by mouth three times a day if needed for muscle spasm 20 Tab 0    phentermine (ADIPEX-P) 37.5 mg tablet Take 1 Tab by mouth every morning. Max Daily Amount: 37.5 mg. 30 Tab 0    furosemide (LASIX) 40 mg tablet take 1 tablet by mouth once daily 30 Tab 1    potassium chloride (K-DUR, KLOR-CON) 20 mEq tablet take 1 tablet by mouth once daily 30 Tab 5    VITAMIN D3 2,000 unit cap capsule take 1 capsule by mouth once daily 90 Cap 1    atenolol (TENORMIN) 25 mg tablet take 1 tablet by mouth once daily 30 Tab 3    insulin glargine (LANTUS SOLOSTAR) 100 unit/mL (3 mL) pen INJECT 30 UNITS DAILY (Patient taking differently: 30 Units nightly. INJECT 30 UNITS DAILY) 15 Each 0    metFORMIN ER (GLUCOPHAGE XR) 500 mg tablet take 1 tablet by mouth once daily (Patient taking differently: PATIENT STATES SHE IS TAKING 1000 MG AT BEDTIME AND THAT HER PHYSICIAN KNOWS THIS. SHE HAS DONE THIS SINCE 2010 PER PATIENT) 90 Tab 3    traZODone (DESYREL) 100 mg tablet take 1 tablet by mouth at bedtime 90 Tab 3    polyethylene glycol (MIRALAX) 17 gram packet Take 1 Packet by mouth daily. 30 Each 5    montelukast (SINGULAIR) 10 mg tablet take 1 tablet by mouth once daily 30 Tab 6    MULTIVITAMIN PO Take  by mouth daily.  MULTIVITAMIN WITH MINERALS (HAIR,SKIN AND NAILS PO) Take  by mouth daily.  DOCOSAHEXANOIC ACID/EPA (FISH OIL PO) Take  by mouth daily.  VITAMIN E ACETATE (VITAMIN E PO) Take  by mouth daily.       BD LUER-MARIELY SYRINGE 3 mL 25 gauge x 1\" syrg USE TO INJECT 1 ML EVERY 2 WEEKS 4 Syringe 4    NYSTOP powder two (2) times a day.  0    glimepiride (AMARYL) 2 mg tablet TAKE 1 TAB BY MOUTH TWO (2) TIMES A DAY. 60 Tab 0    potassium chloride SR (K-TAB) 20 mEq tablet TAKE ONE TABLET BY MOUTH DAILY  2     Allergies   Allergen Reactions    Pcn [Penicillins] Swelling     Swelling throat    Shellfish Containing Products Swelling     Swells throat and eyes     Family History   Problem Relation Age of Onset    Cancer Maternal Aunt      OVARIAN;     Cancer Maternal Grandmother      bone cancer    Hypertension Mother     Diabetes Mother     Hypertension Father     Heart Disease Father      CHF    Cancer Paternal Grandmother 79     breast cancer    Anesth Problems Neg Hx      Social History   Substance Use Topics    Smoking status: Never Smoker    Smokeless tobacco: Never Used    Alcohol use Yes      Comment: RARE     Patient Active Problem List   Diagnosis Code    Diabetes mellitus type 2, controlled ([facility] E11.9    Arthritis M19.90    Asthma J45.909    Bipolar 1 disorder ([facility]) F31.9    GERD (gastroesophageal reflux disease) K21.9    High cholesterol E78.00    HTN, goal below 140/90 I10    Migraines G43.909    Morbid obesity ([facility] E66.01    Unspecified sleep apnea G47.30    DDD (degenerative disc disease), cervical M50.30    Chronic neck pain M54.2, G89.29    SONY (obstructive sleep apnea) G47.33    Cervical stenosis of spine M48.02       Depression Risk Factor Screening:     PHQ over the last two weeks 1/9/2017   Little interest or pleasure in doing things Several days   Feeling down, depressed or hopeless Several days   Total Score PHQ 2 2     Alcohol Risk Factor Screening: On any occasion during the past 3 months, have you had more than 3 drinks containing alcohol? No    Do you average more than 7 drinks per week? No      Functional Ability and Level of Safety:     Hearing Loss   normal-to-mild    Activities of Daily Living   Self-care. Requires assistance with: no ADLs    Fall Risk     Fall Risk Assessment, last 12 mths 1/31/2014   Able to walk?  Yes   Fall in past 15 months? No     Abuse Screen   Patient is not abused    Review of Systems   A comprehensive review of systems was negative except for that written in the HPI. Physical Examination     Evaluation of Cognitive Function:  Mood/affect:  happy  Appearance: age appropriate  Family member/caregiver input: none    Visit Vitals    BP 95/65 (BP 1 Location: Right arm, BP Patient Position: Sitting)    Pulse 81    Temp 97.9 °F (36.6 °C) (Oral)    Resp 16    Ht 5' 5\" (1.651 m)    Wt 240 lb 12.8 oz (109.2 kg)    SpO2 98%    BMI 40.07 kg/m2     General:  Alert, cooperative, no distress, appears stated age. Head:  Normocephalic, without obvious abnormality, atraumatic. Eyes:  Conjunctivae/corneas clear. PERRL, EOMs intact. Fundi benign. Ears:  Normal TMs and external ear canals both ears. Nose: Nares normal. Septum midline. Mucosa normal. No drainage or sinus tenderness. Throat: Lips, mucosa, and tongue normal. Teeth and gums normal.   Neck: Supple, symmetrical, trachea midline, no adenopathy, thyroid: no enlargement/tenderness/nodules, no carotid bruit and no JVD. Back:   Symmetric, no curvature. ROM normal. No CVA tenderness. Lungs:   Clear to auscultation bilaterally. Chest wall:  No tenderness or deformity. Heart:  Regular rate and rhythm, S1, S2 normal, no murmur, click, rub or gallop. Breast Exam:  No tenderness, masses, or nipple abnormality. Abdomen:   Soft, non-tender. Bowel sounds normal. No masses,  No organomegaly. Extremities: Extremities normal, atraumatic, no cyanosis or edema. Pulses: 2+ and symmetric all extremities. Skin: Skin color, texture, turgor normal. No rashes or lesions. Lymph nodes: Cervical, supraclavicular, and axillary nodes normal.   Neurologic: CNII-XII intact. Normal strength, sensation and reflexes throughout. Sensory exam of the foot is normal, tested with the monofilament. Good pulses, no lesions or ulcers, good peripheral pulses.       Patient Care Team:  Beth Malcolm MD as PCP - General (Family Practice)  Dimitris Camara MD as Referring Provider (Gynecology)  Xavier Boast, MD (Orthopedic Surgery)    Advice/Referrals/Counseling   Education and counseling provided:  Are appropriate based on today's review and evaluation  End-of-Life planning (with patient's consent)      Assessment/Plan       ICD-10-CM ICD-9-CM    1. Routine general medical examination at a health care facility Z00.00 V70.0    2. Well controlled type 2 diabetes mellitus ([facility] E11.9 250.00 MICROALBUMIN, UR, RAND W/ MICROALBUMIN/CREA RATIO      HM DIABETES FOOT EXAM   3. Screening for alcoholism Z13.89 V79.1    4. Advanced directives, counseling/discussion Z71.89 V65.49 ADVANCE CARE PLANNING FIRST 30 MINS   .

## 2017-06-09 NOTE — MR AVS SNAPSHOT
Visit Information Date & Time Provider Department Dept. Phone Encounter #  
 6/9/2017 10:45 AM Tiesha Osullivan MD [address] [facility] [id number] Follow-up Instructions Return in 3 months (on 9/9/2017). Your Appointments 6/13/2017 10:40 AM  
Any with Ivon Zaidi MD  
[address] [facility] ([address]) Appt Note: F/up- no pap setup. Reviewing other treatment options. [address]  
  
   
 [address] [facility] [address] [facility] [id number] 7/10/2017  9:45 AM  
ROUTINE CARE with Chandra Chaves MD  
[facility]) Appt Note: 3 months f/u appt  
 [address] [phone]  
[phone]  
  
   
 [address] [address] Upcoming Health Maintenance Date Due  
 EYE EXAM RETINAL OR DILATED Q1 9/27/1987 PAP AKA CERVICAL CYTOLOGY 9/27/1998 INFLUENZA AGE 9 TO ADULT 8/1/2017 HEMOGLOBIN A1C Q6M 10/7/2017 LIPID PANEL Q1 11/11/2017 FOOT EXAM Q1 6/9/2018 MICROALBUMIN Q1 6/9/2018 MEDICARE YEARLY EXAM 6/10/2018 DTaP/Tdap/Td series (2 - Td) 3/10/2024 Allergies as of 6/9/2017  Review Complete On: 6/9/2017 By: Tiesha Osullivan MD  
  
 Severity Noted Reaction Type Reactions Pcn [Penicillins] High 05/03/2010    Swelling Swelling throat Shellfish Containing Products High 03/06/2011    Swelling Swells throat and eyes Current Immunizations  Reviewed on 5/26/2016 Name Date Tdap 3/10/2014  5:21 PM  
  
 Not reviewed this visit You Were Diagnosed With   
  
 Codes Comments Well controlled type 2 diabetes mellitus ([facility]    -  Primary ICD-10-CM: E11.9 ICD-9-CM: 250.00 Routine general medical examination at a health care facility     ICD-10-CM: Z00.00 ICD-9-CM: V70.0 Screening for alcoholism     ICD-10-CM: Z13.89 ICD-9-CM: V79.1 Advanced directives, counseling/discussion     ICD-10-CM: Z71.89 ICD-9-CM: V65.49 Vitals BP Pulse Temp Resp Height(growth percentile) Weight(growth percentile) 95/65 (BP 1 Location: Right arm, BP Patient Position: Sitting) 81 97.9 °F (36.6 °C) (Oral) 16 5' 5\" (1.651 m) 240 lb 12.8 oz (109.2 kg) SpO2 BMI OB Status Smoking Status 98% 40.07 kg/m2 Hysterectomy Never Smoker Vitals History BMI and BSA Data Body Mass Index Body Surface Area 40.07 kg/m 2 2.24 m 2 Preferred Pharmacy Pharmacy Name Phone [id number]- [place], [place] - [address] [phone] Your Updated Medication List  
  
   
This list is accurate as of: 6/9/17 11:32 AM.  Always use your most recent med list.  
  
  
  
  
 atenolol 25 mg tablet Commonly known as:  TENORMIN  
take 1 tablet by mouth once daily BD LUER-MARIELY SYRINGE 3 mL 25 gauge x 1\" Syrg Generic drug:  Syringe with Needle (Disp) USE TO INJECT 1 ML EVERY 2 WEEKS  
  
 cyclobenzaprine 10 mg tablet Commonly known as:  FLEXERIL  
take 1 tablet by mouth three times a day if needed for muscle spasm  
  
 diclofenac EC 75 mg EC tablet Commonly known as:  VOLTAREN  
take 1 tablet by mouth twice a day FISH OIL PO Take  by mouth daily. furosemide 40 mg tablet Commonly known as:  LASIX  
take 1 tablet by mouth once daily  
  
 glimepiride 2 mg tablet Commonly known as:  AMARYL Take 1 Tab by mouth two (2) times a day. glucose blood VI test strips strip Commonly known as:  Ascensia CONTOUR  
E11.69 Test blood sugar 3 times a day HAIR,SKIN AND NAILS PO Take  by mouth daily. insulin glargine 100 unit/mL (3 mL) Inpn Commonly known as:  LANTUS SOLOSTAR INJECT 30 UNITS DAILY Lancets Misc E11.69  
  
 linaclotide 145 mcg Cap capsule Commonly known as:  Darolyn Bathe Take 1 Cap by mouth Daily (before breakfast). loratadine 10 mg tablet Commonly known as:  Eston Deacon Take 1 Tab by mouth nightly. metFORMIN  mg tablet Commonly known as:  GLUCOPHAGE XR  
take 1 tablet by mouth once daily  
  
 montelukast 10 mg tablet Commonly known as:  SINGULAIR  
take 1 tablet by mouth once daily MULTIVITAMIN PO Take  by mouth daily. mupirocin 2 % ointment Commonly known as:  [employer] Apply  to affected area daily. NYSTOP powder Generic drug:  nystatin  
two (2) times a day. oxyCODONE IR 5 mg immediate release tablet Commonly known as:  Cira Fennel Take 1-2 Tabs by mouth every four (4) hours as needed. Max Daily Amount: 60 mg.  
  
 phentermine 37.5 mg tablet Commonly known as:  ADIPEX-P Take 1 Tab by mouth every morning. Max Daily Amount: 37.5 mg.  
  
 polyethylene glycol 17 gram packet Commonly known as:  Minus Heladio Take 1 Packet by mouth daily. * potassium chloride 20 mEq tablet Commonly known as:  K-DUR, KLOR-CON  
take 1 tablet by mouth once daily * potassium chloride SR 20 mEq tablet Commonly known as:  K-TAB  
TAKE ONE TABLET BY MOUTH DAILY pravastatin 40 mg tablet Commonly known as:  PRAVACHOL Take 1 Tab by mouth nightly. topiramate 25 mg tablet Commonly known as:  TOPAMAX Take 1 Tab by mouth two (2) times daily (with meals). traZODone 100 mg tablet Commonly known as:  DESYREL  
take 1 tablet by mouth at bedtime VITAMIN D3 2,000 unit Cap capsule Generic drug:  Cholecalciferol (Vitamin D3)  
take 1 capsule by mouth once daily VITAMIN E PO Take  by mouth daily. * Notice: This list has 2 medication(s) that are the same as other medications prescribed for you. Read the directions carefully, and ask your doctor or other care provider to review them with you. We Performed the Following ADVANCE CARE PLANNING FIRST 30 MINS [21156 CPT(R)] MICROALBUMIN, UR, RAND W/ MICROALBUMIN/CREA RATIO [id number] CPT(R)] Follow-up Instructions Return in 3 months (on 9/9/2017). Introducing [facility]! [facility] introduces [employer] patient portal. Now you can access parts of your medical record, email your doctor's office, and request medication refills online. 1. In your internet browser, go to [email or web address] 2. Click on the First Time User? Click Here link in the Sign In box. You will see the New Member Sign Up page. 3. Enter your [employer] Max Access Code exactly as it appears below. You will not need to use this code after youve completed the sign-up process. If you do not sign up before the expiration date, you must request a new code. · Office Max Access Code: [email or web address] Expires: 9/7/2017 11:25 AM 
 
4. Enter the last four digits of your Social Security Number (xxxx) and Date of Birth (mm/dd/yyyy) as indicated and click Submit. You will be taken to the next sign-up page. 5. Create a Office Max ID. This will be your Office Max login ID and cannot be changed, so think of one that is secure and easy to remember. 6. Create a [employer] Max password. You can change your password at any time. 7. Enter your Password Reset Question and Answer. This can be used at a later time if you forget your password. 8. Enter your e-mail address. You will receive e-mail notification when new information is available in [address] 9. Click Sign Up. You can now view and download portions of your medical record. 10. Click the Download Summary menu link to download a portable copy of your medical information. If you have questions, please visit the Frequently Asked Questions section of the [employer] website. Remember, Office [employer] is NOT to be used for urgent needs. For medical emergencies, dial 911. Now available from your iPhone and Android! Please provide this summary of care documentation to your next provider. Your primary care clinician is listed as Arabella Flores. If you have any questions after today's visit, please call [phone].

## 2017-06-10 LAB
ALBUMIN/CREAT UR: 6.4 MG/G CREAT (ref 0–30)
CREAT UR-MCNC: 675.5 MG/DL
MICROALBUMIN UR-MCNC: 43.2 UG/ML

## 2017-06-13 ENCOUNTER — DOCUMENTATION ONLY (OUTPATIENT)
Dept: SLEEP MEDICINE | Age: 42
End: 2017-06-13

## 2017-06-13 ENCOUNTER — OFFICE VISIT (OUTPATIENT)
Dept: SLEEP MEDICINE | Age: 42
End: 2017-06-13

## 2017-06-13 VITALS
SYSTOLIC BLOOD PRESSURE: 109 MMHG | HEIGHT: 65 IN | DIASTOLIC BLOOD PRESSURE: 70 MMHG | HEART RATE: 88 BPM | OXYGEN SATURATION: 98 % | TEMPERATURE: 97.8 F | WEIGHT: 238 LBS | BODY MASS INDEX: 39.65 KG/M2

## 2017-06-13 DIAGNOSIS — F31.9 BIPOLAR 1 DISORDER (HCC): ICD-10-CM

## 2017-06-13 DIAGNOSIS — I10 HTN, GOAL BELOW 140/90: ICD-10-CM

## 2017-06-13 DIAGNOSIS — G47.33 OSA (OBSTRUCTIVE SLEEP APNEA): Primary | ICD-10-CM

## 2017-06-13 NOTE — PATIENT INSTRUCTIONS
[address] [facility] [place]. [place], [address]  Tel.  [phone]  Fax. 100 [facility] 60  [place], [address] [facility]  Tel.  [phone]  Fax. [phone] [address]  Tel.  [phone]  Fax. [phone]     Sleep Apnea: After Your Visit  Your Care Instructions  Sleep apnea occurs when you frequently stop breathing for 10 seconds or longer during sleep. It can be mild to severe, based on the number of times per hour that you stop breathing or have slowed breathing. Blocked or narrowed airways in your nose, mouth, or throat can cause sleep apnea. Your airway can become blocked when your throat muscles and tongue relax during sleep. Sleep apnea is common, occurring in 1 out of 20 individuals. Individuals having any of the following characteristics should be evaluated and treated right away due to high risk and detrimental consequences from untreated sleep apnea:  1. Obesity  2. Congestive Heart failure  3. Atrial Fibrillation  4. Uncontrolled Hypertension  5. Type II Diabetes  6. Night-time Arrhythmias  7. Stroke  8. Pulmonary Hypertension  9. High-risk Driving Populations (pilots, truck drivers, etc.)  10. Patients Considering Weight-loss Surgery    How do you know you have sleep apnea? You probably have sleep apnea if you answer 'yes' to 3 or more of the following questions:  S - Have you been told that you Snore? T - Are you often Tired during the day? O - Has anyone Observed you stop breathing while sleeping? P- Do you have (or are being treated for) high blood Pressure? B - Are you obese (Body Mass Index > 35)? A - Is your Age 48years old or older? N - Is your Neck size greater than 16 inches? G - Are you male Gender? A sleep physician can prescribe a breathing device that prevents tissues in the throat from blocking your airway.  Or your doctor may recommend using a dental device (oral breathing device) to help keep your airway open. In some cases, surgery may be needed to remove enlarged tissues in the throat. Follow-up care is a key part of your treatment and safety. Be sure to make and go to all appointments, and call your doctor if you are having problems. It's also a good idea to know your test results and keep a list of the medicines you take. How can you care for yourself at home? · Lose weight, if needed. It may reduce the number of times you stop breathing or have slowed breathing. · Go to bed at the same time every night. · Sleep on your side. It may stop mild apnea. If you tend to roll onto your back, sew a pocket in the back of your pajama top. Put a tennis ball into the pocket, and stitch the pocket shut. This will help keep you from sleeping on your back. · Avoid alcohol and medicines such as sleeping pills and sedatives before bed. · Do not smoke. Smoking can make sleep apnea worse. If you need help quitting, talk to your doctor about stop-smoking programs and medicines. These can increase your chances of quitting for good. · Prop up the head of your bed 4 to 6 inches by putting bricks under the legs of the bed. · Treat breathing problems, such as a stuffy nose, caused by a cold or allergies. · Use a continuous positive airway pressure (CPAP) breathing machine if lifestyle changes do not help your apnea and your doctor recommends it. The machine keeps your airway from closing when you sleep. · If CPAP does not help you, ask your doctor whether you should try other breathing machines. A bilevel positive airway pressure machine has two types of air pressureâone for breathing in and one for breathing out. Another device raises or lowers air pressure as needed while you breathe. · If your nose feels dry or bleeds when using one of these machines, talk with your doctor about increasing moisture in the air. A humidifier may help.   · If your nose is runny or stuffy from using a breathing machine, talk with your doctor about using decongestants or a corticosteroid nasal spray. When should you call for help? Watch closely for changes in your health, and be sure to contact your doctor if:  · You still have sleep apnea even though you have made lifestyle changes. · You are thinking of trying a device such as CPAP. · You are having problems using a CPAP or similar machine. Where can you learn more? Go to [employer]. Enter N488 in the search box to learn more about \"Sleep Apnea: After Your Visit. \"   © 9452-3071 Healthwise, [employer]. Care instructions adapted under license by Gildardo Block (which disclaims liability or warranty for this information). This care instruction is for use with your licensed healthcare professional. If you have questions about a medical condition or this instruction, always ask your healthcare professional. Philip Martiniss any warranty or liability for your use of this information. PROPER SLEEP HYGIENE    What to avoid  · Do not have drinks with caffeine, such as coffee or black tea, for 8 hours before bed. · Do not smoke or use other types of tobacco near bedtime. Nicotine is a stimulant and can keep you awake. · Avoid drinking alcohol late in the evening, because it can cause you to wake in the middle of the night. · Do not eat a big meal close to bedtime. If you are hungry, eat a light snack. · Do not drink a lot of water close to bedtime, because the need to urinate may wake you up during the night. · Do not read or watch TV in bed. Use the bed only for sleeping and sexual activity. What to try  · Go to bed at the same time every night, and wake up at the same time every morning. Do not take naps during the day. · Keep your bedroom quiet, dark, and cool. · Get regular exercise, but not within 3 to 4 hours of your bedtime. .  · Sleep on a comfortable pillow and mattress.   · If watching the clock makes you anxious, turn it facing away from you so you cannot see the time. · If you worry when you lie down, start a worry book. Well before bedtime, write down your worries, and then set the book and your concerns aside. · Try meditation or other relaxation techniques before you go to bed. · If you cannot fall asleep, get up and go to another room until you feel sleepy. Do something relaxing. Repeat your bedtime routine before you go to bed again. · Make your house quiet and calm about an hour before bedtime. Turn down the lights, turn off the TV, log off the computer, and turn down the volume on music. This can help you relax after a busy day. Drowsy Driving  The [address] [place] [address] Road Traffic Safety Administration cites drowsiness as a causing factor in more than 802,200 police reported crashes annually, resulting in 76,000 injuries and 1,500 deaths. Other surveys suggest 55% of people polled have driven while drowsy in the past year, 23% had fallen asleep but not crashed, 3% crashed, and 2% had and accident due to drowsy driving. Who is at risk? Young Drivers: One study of drowsy driving accidents states that 55% of the drivers were under 25 years. Of those, 75% were male. Shift Workers and Travelers: People who work overnight or travel across time zones frequently are at higher risk of experiencing Circadian Rhythm Disorders. They are trying to work and function when their body is programed to sleep. Sleep Deprived: Lack of sleep has a serious impact on your ability to pay attention or focus on a task. Consistently getting less than the average of 8 hours your body needs creates partial or cumulative sleep deprivation. Untreated Sleep Disorders: Sleep Apnea, Narcolepsy, R.L.S., and other sleep disorders (untreated) prevent a person from getting enough restful sleep. This leads to excessive daytime sleepiness and increases the risk for drowsy driving accidents by up to 7 times.   Medications / Alcohol: Even over the counter medications can cause drowsiness. Medications that impair a drivers attention should have a warning label. Alcohol naturally makes you sleepy and on its own can cause accidents. Combined with excessive drowsiness its effects are amplified. Signs of Drowsy Driving:   * You don't remember driving the last few miles   * You may drift out of your kimi   * You are unable to focus and your thoughts wander   * You may yawn more often than normal   * You have difficulty keeping your eyes open / nodding off   * Missing traffic signs, speeding, or tailgating  Prevention-   Good sleep hygiene, lifestyle and behavioral choices have the most impact on drowsy driving. There is no substitute for sleep and the average person requires 8 hours nightly. If you find yourself driving drowsy, stop and sleep. Consider the sleep hygiene tips provided during your visit as well. Medication Refill Policy: Refills for all medications require 1 week advance notice. Please have your pharmacy fax a refill request. We are unable to fax, or call in \"controled substance\" medications and you will need to pick these prescriptions up from our office. Contraqer Activation    Thank you for requesting access to Contraqer. Please follow the instructions below to securely access and download your online medical record. Contraqer allows you to send messages to your doctor, view your test results, renew your prescriptions, schedule appointments, and more. How Do I Sign Up? 1. In your internet browser, go to [email or web address]. 2. Click on the First Time User? Click Here link in the Sign In box. You will see the New Member Sign Up page. 3. Enter your Contraqer Access Code exactly as it appears below. You will not need to use this code after youve completed the sign-up process. If you do not sign up before the expiration date, you must request a new code.     Contraqer Access Code: [email or web address]  Expires: 9/7/2017 11:25 AM (This is the date your Appvance access code will )    4. Enter the last four digits of your Social Security Number (xxxx) and Date of Birth (mm/dd/yyyy) as indicated and click Submit. You will be taken to the next sign-up page. 5. Create a NetDragont ID. This will be your Appvance login ID and cannot be changed, so think of one that is secure and easy to remember. 6. Create a Appvance password. You can change your password at any time. 7. Enter your Password Reset Question and Answer. This can be used at a later time if you forget your password. 8. Enter your e-mail address. You will receive e-mail notification when new information is available in [address] 9. Click Sign Up. You can now view and download portions of your medical record. 10. Click the Download Summary menu link to download a portable copy of your medical information. Additional Information    If you have questions, please call [phone]. Remember, Appvance is NOT to be used for urgent needs. For medical emergencies, dial 911.

## 2017-06-13 NOTE — PROGRESS NOTES
Home sleep testing was performed and the results of the study were explained to the patient. Please refer to interpretation report for further details. Apnea/Hypopnea index of 8.9 per hour, SpO2 saira was 89 % which indicates mild apnea. She continues to have snoring, snorting, periods of not breathing, awakening in the middle of the night because of no specific reason and daytime tiredness / fatigue. She was prescribed an APAP Device which she has not been able to use because of cost and is currently interested in reassessment and ongoing therapy. Visit Vitals    /70    Pulse 88    Temp 97.8 °F (36.6 °C)    Ht 5' 5\" (1.651 m)    Wt 238 lb (108 kg)    SpO2 98%    BMI 39.61 kg/m2           General:   Alert, oriented, not in distress   Neck:   No JVD    Chest/Lungs:  Equal lung expansion , clear on auscultation    CVS:  Normal rate, regular rhythm ;    Extremities:  no obvious rashes , negative edema    Neuro:  No focal deficits ; No obvious tremor    Psych:  Normal affect ,  Normal countenance ;       Encounter Diagnoses   Name Primary?  SONY (obstructive sleep apnea) Yes    Bipolar 1 disorder (HCC)     HTN, goal below 140/90      Orders Placed This Encounter    SPLIT CPAP/PSG     Standing Status:   Future     Standing Expiration Date:   12/12/2017     Order Specific Question:   Reason for Exam     Answer:   SONY     * Testing ordered to re-qualify for therapy. * Patient to look into whether she wants to stay with [facility] or would get a donated device from [facility]. Office visit exceeded 15 minutes with counseling and direction of care taking up more than 50% of the allotted time.

## 2017-06-13 NOTE — PROGRESS NOTES
At check out, patient said she would give us a call back to schedule her study. She was also given Bushra Goodwin's DME information.

## 2017-06-14 DIAGNOSIS — E53.8 VITAMIN B12 DEFICIENCY: ICD-10-CM

## 2017-06-14 NOTE — PROGRESS NOTES
Your kidneys are spilling protein now. It is very important to control your blood sugar and pressure.  We will continue to work on this

## 2017-06-15 RX ORDER — CYANOCOBALAMIN 1000 UG/ML
1000 INJECTION, SOLUTION INTRAMUSCULAR; SUBCUTANEOUS ONCE
Qty: 1 VIAL | Refills: 6 | OUTPATIENT
Start: 2017-06-16 | End: 2017-06-16

## 2017-07-18 ENCOUNTER — OFFICE VISIT (OUTPATIENT)
Dept: BARIATRICS/WEIGHT MGMT | Age: 42
End: 2017-07-18

## 2017-07-18 DIAGNOSIS — E66.01 MORBID OBESITY, UNSPECIFIED OBESITY TYPE (HCC): Primary | ICD-10-CM

## 2017-07-26 ENCOUNTER — OFFICE VISIT (OUTPATIENT)
Dept: FAMILY MEDICINE CLINIC | Age: 42
End: 2017-07-26

## 2017-07-26 VITALS
WEIGHT: 240.2 LBS | HEIGHT: 67 IN | TEMPERATURE: 97.7 F | RESPIRATION RATE: 19 BRPM | BODY MASS INDEX: 37.7 KG/M2 | DIASTOLIC BLOOD PRESSURE: 83 MMHG | OXYGEN SATURATION: 100 % | SYSTOLIC BLOOD PRESSURE: 125 MMHG | HEART RATE: 81 BPM

## 2017-07-26 DIAGNOSIS — G47.33 OSA (OBSTRUCTIVE SLEEP APNEA): ICD-10-CM

## 2017-07-26 DIAGNOSIS — E78.00 HIGH CHOLESTEROL: ICD-10-CM

## 2017-07-26 DIAGNOSIS — I10 HTN, GOAL BELOW 140/90: ICD-10-CM

## 2017-07-26 DIAGNOSIS — E11.21 CONTROLLED TYPE 2 DIABETES MELLITUS WITH DIABETIC NEPHROPATHY, WITHOUT LONG-TERM CURRENT USE OF INSULIN (HCC): Primary | ICD-10-CM

## 2017-07-26 RX ORDER — PHENTERMINE HYDROCHLORIDE 37.5 MG/1
37.5 TABLET ORAL
Qty: 30 TAB | Refills: 0 | Status: SHIPPED | OUTPATIENT
Start: 2017-07-26 | End: 2017-08-29 | Stop reason: SDUPTHER

## 2017-07-26 NOTE — PROGRESS NOTES
1. Have you been to the ER, urgent care clinic since your last visit? Hospitalized since your last visit? No    2. Have you seen or consulted any other health care providers outside of the [address] [place] [address] since your last visit? Include any pap smears or colon screening.  No    Chief Complaint   Patient presents with    Weight Management     Body Weight: 240.2  Body Fat%: 42.5  Muscle Mass Weight: 38.7  Body Water Weight: 27.5  Basal Metabolic Rate: 6829  BMI: 38.19

## 2017-07-26 NOTE — PATIENT INSTRUCTIONS
Learning About CPAP for Sleep Apnea  What is CPAP? CPAP is a small machine that you use at home every night while you sleep. It increases air pressure in your throat to keep your airway open. When you have sleep apnea, this can help you sleep better so you feel much better. CPAP stands for \"continuous positive airway pressure. \"  The CPAP machine will have one of the following:  · A mask that covers your nose and mouth  · Prongs that fit into your nose  · A mask that covers your nose only, the most common type. This type is called NCPAP. The N stands for \"nasal.\"  Why is it done? CPAP is usually the best treatment for obstructive sleep apnea. It is the first treatment choice and the most widely used. Your doctor may suggest CPAP if you have:  · Moderate to severe sleep apnea. · Sleep apnea and coronary artery disease (CAD) or heart failure. How does it help? · CPAP can help you have more normal sleep, so you feel less sleepy and more alert during the daytime. · CPAP may help keep heart failure or other heart problems from getting worse. · CPAP may help lower your blood pressure. · If you use CPAP, your bed partner may also sleep better because you are not snoring or restless. What are the side effects? Some people who use CPAP have:  · A dry or stuffy nose and a sore throat. · Irritated skin on the face. · Sore eyes. · Bloating. If you have any of these problems, work with your doctor to fix them. Here are some things you can try:  · Be sure the mask or nasal prongs fit well. · See if your doctor can adjust the pressure of your CPAP. · If your nose is dry, try a humidifier. · If your nose is runny or stuffy, try decongestant medicine or a steroid nasal spray. Be safe with medicines. Read and follow all instructions on the label. Do not use the medicine longer than the label says. If these things do not help, you might try a different type of machine.  Some machines have air pressure that adjusts on its own. Others have air pressures that are different when you breathe in than when you breathe out. This may reduce discomfort caused by too much pressure in your nose. Where can you learn more? Go to http://marissa-marjan.info/. Enter A660 in the search box to learn more about \"Learning About CPAP for Sleep Apnea. \"  Current as of: March 25, 2017  Content Version: 11.3  © 7611-7660 [employer], [employer]. Care instructions adapted under license by [employer] (which disclaims liability or warranty for this information). If you have questions about a medical condition or this instruction, always ask your healthcare professional. [address] any warranty or liability for your use of this information.

## 2017-07-26 NOTE — MR AVS SNAPSHOT
Visit Information Date & Time Provider Department Dept. Phone Encounter #  
 7/26/2017 10:00 AM David Young MD [address] [place] [address] [id number] Follow-up Instructions Return in about 2 weeks (around 8/9/2017). Your Appointments 9/11/2017 10:45 AM  
ROUTINE CARE with MD Charis Garcia Taylor Martin [address] [place] [address]) Appt Note: follow up Clarence Bower [id number]  
[facility] [id number] Upcoming Health Maintenance Date Due  
 EYE EXAM RETINAL OR DILATED Q1 9/27/1987 PAP AKA CERVICAL CYTOLOGY 9/27/1998 INFLUENZA AGE 9 TO ADULT 8/1/2017 HEMOGLOBIN A1C Q6M 10/7/2017 LIPID PANEL Q1 11/11/2017 FOOT EXAM Q1 6/9/2018 MICROALBUMIN Q1 6/9/2018 MEDICARE YEARLY EXAM 6/10/2018 DTaP/Tdap/Td series (2 - Td) 3/10/2024 Allergies as of 7/26/2017  Review Complete On: 7/26/2017 By: David Young MD  
  
 Severity Noted Reaction Type Reactions Pcn [Penicillins] High 05/03/2010    Swelling Swelling throat Shellfish Containing Products High 03/06/2011    Swelling Swells throat and eyes Current Immunizations  Reviewed on 5/26/2016 Name Date Tdap 3/10/2014  5:21 PM  
  
 Not reviewed this visit You Were Diagnosed With   
  
 Codes Comments Controlled type 2 diabetes mellitus with diabetic nephropathy, without long-term current use of insulin ([facility] [address]    -  Primary ICD-10-CM: E11.21 
ICD-9-CM: 250.40, 583.81 Obesity, Class II, BMI 35-39.9, with comorbidity     ICD-10-CM: E66.9 ICD-9-CM: 278.00   
 HTN, goal below 140/90     ICD-10-CM: I10 
ICD-9-CM: 401.9 High cholesterol     ICD-10-CM: E78.00 ICD-9-CM: 272.0   
 SONY (obstructive sleep apnea)     ICD-10-CM: G47.33 
ICD-9-CM: 327.23 Vitals BP Pulse Temp Resp Height(growth percentile) Weight(growth percentile) 125/83 81 97.7 °F (36.5 °C) (Oral) 19 5' 6.5\" (1.689 m) 240 lb 3.2 oz (109 kg) SpO2 BMI OB Status Smoking Status 100% 38.19 kg/m2 Hysterectomy Never Smoker Vitals History BMI and BSA Data Body Mass Index Body Surface Area  
 38.19 kg/m 2 2.26 m 2 Preferred Pharmacy Pharmacy Name Phone [id number]- [place], [place] - [address] [phone] Your Updated Medication List  
  
   
This list is accurate as of: 7/26/17 10:45 AM.  Always use your most recent med list.  
  
  
  
  
 atenolol 25 mg tablet Commonly known as:  TENORMIN  
take 1 tablet by mouth once daily BD LUER-MARIELY SYRINGE 3 mL 25 gauge x 1\" Syrg Generic drug:  Syringe with Needle (Disp) USE TO INJECT 1 ML EVERY 2 WEEKS  
  
 cyclobenzaprine 10 mg tablet Commonly known as:  FLEXERIL  
take 1 tablet by mouth three times a day if needed for muscle spasm  
  
 diclofenac EC 75 mg EC tablet Commonly known as:  VOLTAREN  
take 1 tablet by mouth twice a day FISH OIL PO Take  by mouth daily. furosemide 40 mg tablet Commonly known as:  LASIX  
take 1 tablet by mouth once daily  
  
 glimepiride 2 mg tablet Commonly known as:  AMARYL  
TAKE 1 TAB BY MOUTH TWO (2) TIMES A DAY. glucose blood VI test strips strip Commonly known as:  Ascensia CONTOUR  
E11.69 Test blood sugar 3 times a day HAIR,SKIN AND NAILS PO Take  by mouth daily. insulin glargine 100 unit/mL (3 mL) Inpn Commonly known as:  LANTUS SOLOSTAR INJECT 30 UNITS DAILY Lancets Misc E11.69  
  
 linaclotide 145 mcg Cap capsule Commonly known as:  Herschell Harp Take 1 Cap by mouth Daily (before breakfast). loratadine 10 mg tablet Commonly known as:  Ronold Dahlen Take 1 Tab by mouth nightly. metFORMIN  mg tablet Commonly known as:  GLUCOPHAGE XR  
take 1 tablet by mouth once daily  
  
 montelukast 10 mg tablet Commonly known as:  SINGULAIR  
take 1 tablet by mouth once daily MULTIVITAMIN PO Take  by mouth daily. mupirocin 2 % ointment Commonly known as:  [employer] Apply  to affected area daily. NYSTOP powder Generic drug:  nystatin  
two (2) times a day. oxyCODONE IR 5 mg immediate release tablet Commonly known as:  Gareld Maid Take 1-2 Tabs by mouth every four (4) hours as needed. Max Daily Amount: 60 mg.  
  
 phentermine 37.5 mg tablet Commonly known as:  ADIPEX-P Take 1 Tab by mouth every morning. Max Daily Amount: 37.5 mg.  
  
 polyethylene glycol 17 gram packet Commonly known as:  Marlane Blinks Take 1 Packet by mouth daily. * potassium chloride 20 mEq tablet Commonly known as:  K-DUR, KLOR-CON  
take 1 tablet by mouth once daily * potassium chloride SR 20 mEq tablet Commonly known as:  K-TAB  
TAKE ONE TABLET BY MOUTH DAILY pravastatin 40 mg tablet Commonly known as:  PRAVACHOL Take 1 Tab by mouth nightly. topiramate 25 mg tablet Commonly known as:  TOPAMAX Take 1 Tab by mouth two (2) times daily (with meals). traZODone 100 mg tablet Commonly known as:  DESYREL  
take 1 tablet by mouth at bedtime VITAMIN D3 2,000 unit Cap capsule Generic drug:  Cholecalciferol (Vitamin D3)  
take 1 capsule by mouth once daily VITAMIN E PO Take  by mouth daily. * Notice: This list has 2 medication(s) that are the same as other medications prescribed for you. Read the directions carefully, and ask your doctor or other care provider to review them with you. Prescriptions Printed Refills  
 phentermine (ADIPEX-P) 37.5 mg tablet 0 Sig: Take 1 Tab by mouth every morning. Max Daily Amount: 37.5 mg.  
 Class: Print Route: Oral  
  
Follow-up Instructions Return in about 2 weeks (around 8/9/2017). Patient Instructions Learning About CPAP for Sleep Apnea What is CPAP? CPAP is a small machine that you use at home every night while you sleep. It increases air pressure in your throat to keep your airway open. When you have sleep apnea, this can help you sleep better so you feel much better. CPAP stands for \"continuous positive airway pressure. \" The CPAP machine will have one of the following: · A mask that covers your nose and mouth · Prongs that fit into your nose · A mask that covers your nose only, the most common type. This type is called NCPAP. The N stands for \"nasal.\" Why is it done? CPAP is usually the best treatment for obstructive sleep apnea. It is the first treatment choice and the most widely used. Your doctor may suggest CPAP if you have: · Moderate to severe sleep apnea. · Sleep apnea and coronary artery disease (CAD) or heart failure. How does it help? · CPAP can help you have more normal sleep, so you feel less sleepy and more alert during the daytime. · CPAP may help keep heart failure or other heart problems from getting worse. · CPAP may help lower your blood pressure. · If you use CPAP, your bed partner may also sleep better because you are not snoring or restless. What are the side effects? Some people who use CPAP have: · A dry or stuffy nose and a sore throat. · Irritated skin on the face. · Sore eyes. · Bloating. If you have any of these problems, work with your doctor to fix them. Here are some things you can try: · Be sure the mask or nasal prongs fit well. · See if your doctor can adjust the pressure of your CPAP. · If your nose is dry, try a humidifier. · If your nose is runny or stuffy, try decongestant medicine or a steroid nasal spray. Be safe with medicines. Read and follow all instructions on the label. Do not use the medicine longer than the label says. If these things do not help, you might try a different type of machine. Some machines have air pressure that adjusts on its own.  Others have air pressures that are different when you breathe in than when you breathe out. This may reduce discomfort caused by too much pressure in your nose. Where can you learn more? Go to http://marissa-marjan.info/. Enter S966 in the search box to learn more about \"Learning About CPAP for Sleep Apnea. \" Current as of: March 25, 2017 Content Version: 11.3 © 5373-7536 [employer]. Care instructions adapted under license by [employer] (which disclaims liability or warranty for this information). If you have questions about a medical condition or this instruction, always ask your healthcare professional. [place] [address] any warranty or liability for your use of this information. Introducing [facility]! [facility] introduces SchoolFeed patient portal. Now you can access parts of your medical record, email your doctor's office, and request medication refills online. 1. In your internet browser, go to [email or web address]. [facility] 2. Click on the First Time User? Click Here link in the Sign In box. You will see the New Member Sign Up page. 3. Enter your SchoolFeed Access Code exactly as it appears below. You will not need to use this code after youve completed the sign-up process. If you do not sign up before the expiration date, you must request a new code. · SchoolFeed Access Code: [email or web address] Expires: 9/7/2017 11:25 AM 
 
4. Enter the last four digits of your Social Security Number (xxxx) and Date of Birth (mm/dd/yyyy) as indicated and click Submit. You will be taken to the next sign-up page. 5. Create a SchoolFeed ID. This will be your SchoolFeed login ID and cannot be changed, so think of one that is secure and easy to remember. 6. Create a SchoolFeed password. You can change your password at any time. 7. Enter your Password Reset Question and Answer. This can be used at a later time if you forget your password. 8. Enter your e-mail address. You will receive e-mail notification when new information is available in [address] 9. Click Sign Up. You can now view and download portions of your medical record. 10. Click the Download Summary menu link to download a portable copy of your medical information. If you have questions, please visit the Frequently Asked Questions section of the Elast[employer] website. Remember, Elast[employer]ile is NOT to be used for urgent needs. For medical emergencies, dial 911. Now available from your iPhone and Android! Please provide this summary of care documentation to your next provider. Your primary care clinician is listed as Estevan Newell. If you have any questions after today's visit, please call [phone].

## 2017-07-28 NOTE — PROGRESS NOTES
Weight Loss Progress Note: follow up Physician Visit      Rebel Lacey is a 46 y.o. female with BMI ,38.19 who is here for her follow up  Evaluation for the medical bariatric care. CC: I want to be healthier    Weight History  Current weight 240 and BMI is Body mass index is 38.19 kg/(m^2). Goal weight   Highest weight *  (See weight gain time line scanned into media section of chart)        Significant Medical History    Update on sleep apnea and  CPAP cannot afford apap    Ever had bariatric surgery: no    Pregnant or planning on becoming pregnant w/in 6 months: no     If female: GP    Significant Psychosocial History   Any history of drug abuse or dependence: no  Current Major Lifestyle Changes: no  Any potential unsupportive people: no          Social History  Social History   Substance Use Topics    Smoking status: Never Smoker    Smokeless tobacco: Never Used    Alcohol use Yes      Comment: RARE     How many times a week do you eat out?  0    Do you drink any EtOH?  no   If so, how much? Do you have upcoming any travel in the next 6 weeks?  no   If so, what do you have planned?           Exercise  How many days a week do you currently exercise?  0 days  Have you ever been told by a physician not to exercise?  no      Objective  Visit Vitals    /83    Pulse 81    Temp 97.7 °F (36.5 °C) (Oral)    Resp 19    Ht 5' 6.5\" (1.689 m)    Wt 240 lb 3.2 oz (109 kg)    SpO2 100%    BMI 38.19 kg/m2       Bicep - (right ) circumference  Waist Circumference: See Weight Management Doc Flowsheet  Neck Circumference: See Weight Management Doc Flowsheet  Percent Body Fat: See Weight Management Doc Flowsheet  Weight Metrics 7/26/2017 7/26/2017 6/13/2017 6/9/2017 5/9/2017 5/1/2017 4/18/2017   Weight - 240 lb 3.2 oz 238 lb 240 lb 12.8 oz 242 lb 12.8 oz 235 lb 0.2 oz 242 lb   Neck Circ (inches) - - - - - - -   Waist Measure Inches 47 - - - - - -   Exercise Mins/week 0 - - - - - -   Body Fat % 42.5 - - - - - -   BMI - 38.19 kg/m2 39.61 kg/m2 40.07 kg/m2 40.4 kg/m2 39.11 kg/m2 40.27 kg/m2         Labs:     Review of Systems  Complete ROS negative except where noted above      Physical Exam    Vital Signs Reviewed  Weight Management Metrics Reviewed    Vital Signs Reviewed  Appearance: Obese, A&O x 3, NAD  HEENT:  NC/AT, EOMI, PERRL, No scleral icterus, malampatti score:4  Skin:    Skin tags - no   Acanthosis Nigricans - mild  Neck:  No nodes, thyromegaly   Heart:  RRR without M/R/G  Lungs:  CTAB, no rhonchi, rales, or wheezes with good air exchange   Abdomen:  Non-tender, pos bowel sounds; hepatomegaly -   Ext:  No C/C/E        Assessment & Plan  Diagnoses and all orders for this visit:    1. Controlled type 2 diabetes mellitus with diabetic nephropathy, without long-term current use of insulin (HCC)  Last level a1c was less than 6  2. Obesity, Class II, BMI 35-39.9, with comorbidity  -     phentermine (ADIPEX-P) 37.5 mg tablet; Take 1 Tab by mouth every morning. Max Daily Amount: 37.5 mg.    3. HTN, goal below 140/90  Good bp control  4. High cholesterol  It's time to recheck. w  5. SONY (obstructive sleep apnea)    No device due to cost. Attempting weight loss    Based on his history and exam, Aniya Isabel is a good candidate for the  [facility] Loss Program     Diet:start low carb plan. Cannot affrd [facility]. Activity: continue increasing activity. She has been cleared by neurosurgery. No running , ellyptical or pool exercise is preferred    Medication:    Patient Instructions        Learning About CPAP for Sleep Apnea  What is CPAP? CPAP is a small machine that you use at home every night while you sleep. It increases air pressure in your throat to keep your airway open. When you have sleep apnea, this can help you sleep better so you feel much better. CPAP stands for \"continuous positive airway pressure. \"  The CPAP machine will have one of the following:  · A mask that covers your nose and mouth  · Prongs that fit into your nose  · A mask that covers your nose only, the most common type. This type is called NCPAP. The N stands for \"nasal.\"  Why is it done? CPAP is usually the best treatment for obstructive sleep apnea. It is the first treatment choice and the most widely used. Your doctor may suggest CPAP if you have:  · Moderate to severe sleep apnea. · Sleep apnea and coronary artery disease (CAD) or heart failure. How does it help? · CPAP can help you have more normal sleep, so you feel less sleepy and more alert during the daytime. · CPAP may help keep heart failure or other heart problems from getting worse. · CPAP may help lower your blood pressure. · If you use CPAP, your bed partner may also sleep better because you are not snoring or restless. What are the side effects? Some people who use CPAP have:  · A dry or stuffy nose and a sore throat. · Irritated skin on the face. · Sore eyes. · Bloating. If you have any of these problems, work with your doctor to fix them. Here are some things you can try:  · Be sure the mask or nasal prongs fit well. · See if your doctor can adjust the pressure of your CPAP. · If your nose is dry, try a humidifier. · If your nose is runny or stuffy, try decongestant medicine or a steroid nasal spray. Be safe with medicines. Read and follow all instructions on the label. Do not use the medicine longer than the label says. If these things do not help, you might try a different type of machine. Some machines have air pressure that adjusts on its own. Others have air pressures that are different when you breathe in than when you breathe out. This may reduce discomfort caused by too much pressure in your nose. Where can you learn more? Go to http://marissa-marjan.info/. Enter J493 in the search box to learn more about \"Learning About CPAP for Sleep Apnea. \"  Current as of: March 25, 2017  Content Version: 11.3  © 4834-4651 [employer]wise[employer]. Care instructions adapted under license by [employer] (which disclaims liability or warranty for this information). If you have questions about a medical condition or this instruction, always ask your healthcare professional. [address]  any warranty or liability for your use of this information. Follow-up Disposition:  Return in about 2 weeks (around 8/9/2017). Over 50% of the 30 minutes face to face time with Kel consisted of counseling & coordinating and/or discussing treatment plans in reference to her obesity The primary encounter diagnosis was Controlled type 2 diabetes mellitus with diabetic nephropathy, without long-term current use of insulin ([facility] [address] Diagnoses of Obesity, Class II, BMI 35-39.9, with comorbidity, HTN, goal below 140/90, High cholesterol, and SONY (obstructive sleep apnea) were also pertinent to this visit.   The patient verbalizes understanding and agrees with the plan of care    Patient has the advanced directives  booklet to review

## 2017-07-31 NOTE — PROGRESS NOTES
Patient attended a Medically Supervised Weight Loss New Patient Orientation today where we discussed:  - New Direction Very Low Calorie Diet details  - Medical Supervision  - Nutrition education  - Cost  - Policies and compliance required for program enrollment. - Lab slip was given to patient with instructions for having them drawn. Patients initial consultation with physician is tentatively scheduled for:  Future Appointments  Date Time Provider [facility]   8/9/2017 10:30 AM Anjali Vela MD [address] [place] [address]   9/11/2017 10:45 AM Anjali Vela MD [address] [place] [address] starting weight was documented as: There were no vitals taken for this visit. Patient attended a Medically Supervised Weight Loss New Patient Orientation today where we discussed:  - New Direction Very Low Calorie Diet details  - Medical Supervision  - Nutrition education  - Cost  - Policies and compliance required for program enrollment. - Lab slip was given to patient with instructions for having them drawn. Patients initial consultation with physician is tentatively scheduled for:  Future Appointments  Date Time Provider Zafar Mcgowani   8/9/2017 10:30 AM Anjali Vela MD [address] [place] [address]   9/11/2017 10:45 AM Anjali Vela MD [address] [place] [address] starting weight was documented as: There were no vitals taken for this visit.

## 2017-08-02 DIAGNOSIS — E55.9 VITAMIN D DEFICIENCY: ICD-10-CM

## 2017-08-03 RX ORDER — ACETAMINOPHEN 500 MG
2000 TABLET ORAL DAILY
Qty: 90 CAP | Refills: 0 | Status: ON HOLD | OUTPATIENT
Start: 2017-08-03 | End: 2017-11-07

## 2017-08-14 DIAGNOSIS — Z79.4 CONTROLLED TYPE 2 DIABETES MELLITUS WITHOUT COMPLICATION, WITH LONG-TERM CURRENT USE OF INSULIN (HCC): ICD-10-CM

## 2017-08-14 DIAGNOSIS — E11.9 CONTROLLED TYPE 2 DIABETES MELLITUS WITHOUT COMPLICATION, WITH LONG-TERM CURRENT USE OF INSULIN (HCC): ICD-10-CM

## 2017-08-15 RX ORDER — POTASSIUM CHLORIDE 20 MEQ/1
TABLET, EXTENDED RELEASE ORAL
Qty: 30 TAB | Refills: 5 | Status: SHIPPED | OUTPATIENT
Start: 2017-08-15 | End: 2018-04-21 | Stop reason: SDUPTHER

## 2017-08-15 RX ORDER — INSULIN GLARGINE 100 [IU]/ML
30 INJECTION, SOLUTION SUBCUTANEOUS
Qty: 5 PEN | Refills: 3 | Status: ON HOLD | OUTPATIENT
Start: 2017-08-15 | End: 2017-11-07

## 2017-08-25 ENCOUNTER — TELEPHONE (OUTPATIENT)
Dept: FAMILY MEDICINE CLINIC | Age: 42
End: 2017-08-25

## 2017-08-25 NOTE — TELEPHONE ENCOUNTER
Patient requesting form be filled out for [facility]. They've disconnected her electricity and advised her if she had a medical reason for them to be reconnected she'd have to have a form filled out by her provider. Patient is bringing the form to office to be completed. Per patient form has to be faxed to [facility] by 4pm today in order to be reconnected. Patient was advisedt the provider is only her for part of the day.

## 2017-08-25 NOTE — TELEPHONE ENCOUNTER
Patient signed formed. Form emailed/ confirmed at 11:25pm    1:08PM-Called spoke w/ Ileana Townsend at [email or web address]. Per rep, confirmed form has been receivedform and approved. Account notated. Reconnection issued for today  but no time given. Called and notified patient of this information.

## 2017-08-29 ENCOUNTER — OFFICE VISIT (OUTPATIENT)
Dept: FAMILY MEDICINE CLINIC | Age: 42
End: 2017-08-29

## 2017-08-29 VITALS
HEART RATE: 77 BPM | RESPIRATION RATE: 18 BRPM | TEMPERATURE: 97.9 F | OXYGEN SATURATION: 98 % | BODY MASS INDEX: 36.85 KG/M2 | SYSTOLIC BLOOD PRESSURE: 111 MMHG | HEIGHT: 67 IN | DIASTOLIC BLOOD PRESSURE: 76 MMHG | WEIGHT: 234.8 LBS

## 2017-08-29 DIAGNOSIS — M54.42 LOW BACK PAIN WITH RADIATION, LEFT: Primary | ICD-10-CM

## 2017-08-29 RX ORDER — PHENTERMINE HYDROCHLORIDE 37.5 MG/1
37.5 TABLET ORAL
Qty: 30 TAB | Refills: 0 | Status: ON HOLD | OUTPATIENT
Start: 2017-08-29 | End: 2017-11-07

## 2017-08-29 NOTE — PROGRESS NOTES
Weight Loss Progress Note: follow up Physician Visit      Rebel Lacey is a 46 y.o. female with BMI 37.33 who is here for her follow up  Evaluation for the medical bariatric care. Her back hurts every day. She hurts worst in am.     CC: I want back surgery    Weight History  Current weight 234 and BMI is Body mass index is 37.33 kg/(m^2). Goal weight BMI 29  Highest weight 240  (See weight gain time line scanned into media section of chart)        Significant Medical History    Update on sleep apnea and  CPAP pos test but can't afford the cpap    Ever had bariatric surgery: no    Pregnant or planning on becoming pregnant w/in 6 months: no         Significant Psychosocial History   Any history of drug abuse or dependence: no  Current Major Lifestyle Changes: no  Any potential unsupportive people: no          Social History  Social History   Substance Use Topics    Smoking status: Never Smoker    Smokeless tobacco: Never Used    Alcohol use Yes      Comment: RARE     How many times a week do you eat out?  multiple    Do you drink any EtOH?  no   If so, how much? Do you have upcoming any travel in the next 6 weeks?  no   If so, what do you have planned?           Exercise  How many days a week do you currently exercise?  0 days  Have you ever been told by a physician not to exercise?  no      Objective  Visit Vitals    /76    Pulse 77    Temp 97.9 °F (36.6 °C) (Oral)    Resp 18    Ht 5' 6.5\" (1.689 m)    Wt 234 lb 12.8 oz (106.5 kg)    SpO2 98%    BMI 37.33 kg/m2       Bicep - (right ) circumference  Waist Circumference: See Weight Management Doc Flowsheet  Neck Circumference: See Weight Management Doc Flowsheet  Percent Body Fat: See Weight Management Doc Flowsheet  Weight Metrics 8/29/2017 8/29/2017 7/26/2017 7/26/2017 6/13/2017 6/9/2017 5/9/2017   Weight - 234 lb 12.8 oz - 240 lb 3.2 oz 238 lb 240 lb 12.8 oz 242 lb 12.8 oz   Neck Circ (inches) 15 - - - - - -   Waist Measure Inches 45 - 47 - - - -   Exercise Mins/week - - 0 - - - -   Body Fat % 41.9 - 42.5 - - - -   BMI - 37.33 kg/m2 - 38.19 kg/m2 39.61 kg/m2 40.07 kg/m2 40.4 kg/m2         Labs:        Review of Systems  Complete ROS negative except where noted above      Physical Exam    Vital Signs Reviewed  Weight Management Metrics Reviewed    Vital Signs Reviewed  Appearance: Obese, A&O x 3, NAD  HEENT:  NC/AT, EOMI, PERRL, No scleral icterus, malampatti score:  Skin:    Skin tags - no   Acanthosis Nigricans - no  Neck:  No nodes, thyromegaly   Heart:  RRR without M/R/G  Lungs:  CTAB, no rhonchi, rales, or wheezes with good air exchange   Abdomen:  Non-tender, pos bowel sounds; hepatomegaly -   Ext:  No C/C/E        Assessment & Plan  Diagnoses and all orders for this visit:    1. Low back pain with radiation, left  -     REFERRAL TO PHYSICAL THERAPY    2. Obesity, Class II, BMI 35-39.9, with comorbidity  -     phentermine (ADIPEX-P) 37.5 mg tablet; Take 1 Tab by mouth every morning. Max Daily Amount: 37.5 mg.    Diet:get back on the bariatrix diet    Activity: pool therapy    Medication:phentermine 37.5         topamax 25 mg bid    Based on his history and exam, Jen Leiva is a good candidate for the  [facility] Weight Loss Program         There are no Patient Instructions on file for this visit. Follow-up Disposition:  Return in about 2 weeks (around 9/12/2017). Over 50% of the 30 minutes face to face time with Kel consisted of counseling & coordinating and/or discussing treatment plans in reference to her obesity The primary encounter diagnosis was Low back pain with radiation, left. A diagnosis of Obesity, Class II, BMI 35-39.9, with comorbidity was also pertinent to this visit.   The patient verbalizes understanding and agrees with the plan of care    Patient has the advanced directives  booklet to review

## 2017-08-29 NOTE — MR AVS SNAPSHOT
Visit Information Date & Time Provider Department Dept. Phone Encounter #  
 8/29/2017 10:30 AM Amparo Taveras MD [address] [place] [address] [id number] Follow-up Instructions Return in about 2 weeks (around 9/12/2017). Your Appointments 9/11/2017 10:45 AM  
ROUTINE CARE with MD Erika Marcelo. Taylor Martin [id number] [facility]) Appt Note: follow up Ulises Sathish [id number]  
[facility] [address] Upcoming Health Maintenance Date Due  
 EYE EXAM RETINAL OR DILATED Q1 9/27/1987 PAP AKA CERVICAL CYTOLOGY 9/27/1998 INFLUENZA AGE 9 TO ADULT 8/1/2017 HEMOGLOBIN A1C Q6M 10/7/2017 LIPID PANEL Q1 11/11/2017 FOOT EXAM Q1 6/9/2018 MICROALBUMIN Q1 6/9/2018 MEDICARE YEARLY EXAM 6/10/2018 DTaP/Tdap/Td series (2 - Td) 3/10/2024 Allergies as of 8/29/2017  Review Complete On: 8/29/2017 By: Andre Dean LPN Severity Noted Reaction Type Reactions Pcn [Penicillins] High 05/03/2010    Swelling Swelling throat Shellfish Containing Products High 03/06/2011    Swelling Swells throat and eyes Current Immunizations  Reviewed on 5/26/2016 Name Date Tdap 3/10/2014  5:21 PM  
  
 Not reviewed this visit You Were Diagnosed With   
  
 Codes Comments Low back pain with radiation, left    -  Primary ICD-10-CM: M54.42 
ICD-9-CM: 724.2 Obesity, Class II, BMI 35-39.9, with comorbidity     ICD-10-CM: E66.9 ICD-9-CM: 278.00 Vitals BP Pulse Temp Resp Height(growth percentile) Weight(growth percentile) 111/76 77 97.9 °F (36.6 °C) (Oral) 18 5' 6.5\" (1.689 m) 234 lb 12.8 oz (106.5 kg) SpO2 BMI OB Status Smoking Status 98% 37.33 kg/m2 Hysterectomy Never Smoker Vitals History BMI and BSA Data  Body Mass Index Body Surface Area  
 37.33 kg/m 2 2.24 m 2  
  
  
 Preferred Pharmacy Pharmacy Name Phone [id number]- [place], [place] - [address] [phone] Your Updated Medication List  
  
   
This list is accurate as of: 8/29/17 11:22 AM.  Always use your most recent med list.  
  
  
  
  
 atenolol 25 mg tablet Commonly known as:  TENORMIN  
take 1 tablet by mouth once daily BD LUER-MARIELY SYRINGE 3 mL 25 gauge x 1\" Syrg Generic drug:  Syringe with Needle (Disp) USE TO INJECT 1 ML EVERY 2 WEEKS Cholecalciferol (Vitamin D3) 2,000 unit Cap capsule Commonly known as:  VITAMIN D3 Take 2,000 Units by mouth daily. cyclobenzaprine 10 mg tablet Commonly known as:  FLEXERIL  
take 1 tablet by mouth three times a day if needed for muscle spasm  
  
 diclofenac EC 75 mg EC tablet Commonly known as:  VOLTAREN  
take 1 tablet by mouth twice a day FISH OIL PO Take  by mouth daily. furosemide 40 mg tablet Commonly known as:  LASIX  
take 1 tablet by mouth once daily  
  
 glimepiride 2 mg tablet Commonly known as:  AMARYL  
TAKE 1 TAB BY MOUTH TWO (2) TIMES A DAY. glucose blood VI test strips strip Commonly known as:  Ascensia CONTOUR  
E11.69 Test blood sugar 3 times a day HAIR,SKIN AND NAILS PO Take  by mouth daily. insulin glargine 100 unit/mL (3 mL) Inpn Commonly known as:  LANTUS SOLOSTAR  
30 Units by SubCUTAneous route nightly. INJECT 30 UNITS DAILY Lancets Misc E11.69  
  
 linaclotide 145 mcg Cap capsule Commonly known as:  Herschell Harp Take 1 Cap by mouth Daily (before breakfast). loratadine 10 mg tablet Commonly known as:  Ronold Milwaukee Take 1 Tab by mouth nightly. metFORMIN  mg tablet Commonly known as:  GLUCOPHAGE XR  
take 1 tablet by mouth once daily  
  
 montelukast 10 mg tablet Commonly known as:  SINGULAIR  
take 1 tablet by mouth once daily MULTIVITAMIN PO Take  by mouth daily. mupirocin 2 % ointment Commonly known as:  [employer] Apply  to affected area daily. NYSTOP powder Generic drug:  nystatin  
two (2) times a day. oxyCODONE IR 5 mg immediate release tablet Commonly known as:  Bigg Regina Take 1-2 Tabs by mouth every four (4) hours as needed. Max Daily Amount: 60 mg.  
  
 phentermine 37.5 mg tablet Commonly known as:  ADIPEX-P Take 1 Tab by mouth every morning. Max Daily Amount: 37.5 mg.  
  
 polyethylene glycol 17 gram packet Commonly known as:  Malone Baumgarten Take 1 Packet by mouth daily. * potassium chloride SR 20 mEq tablet Commonly known as:  K-TAB  
TAKE ONE TABLET BY MOUTH DAILY * potassium chloride 20 mEq tablet Commonly known as:  K-DUR, KLOR-CON  
take 1 tablet by mouth once daily  
  
 pravastatin 40 mg tablet Commonly known as:  PRAVACHOL Take 1 Tab by mouth nightly. topiramate 25 mg tablet Commonly known as:  TOPAMAX Take 1 Tab by mouth two (2) times daily (with meals). traZODone 100 mg tablet Commonly known as:  DESYREL  
take 1 tablet by mouth at bedtime VITAMIN E PO Take  by mouth daily. * Notice: This list has 2 medication(s) that are the same as other medications prescribed for you. Read the directions carefully, and ask your doctor or other care provider to review them with you. Prescriptions Printed Refills  
 phentermine (ADIPEX-P) 37.5 mg tablet 0 Sig: Take 1 Tab by mouth every morning. Max Daily Amount: 37.5 mg.  
 Class: Print Route: Oral  
  
We Performed the Following REFERRAL TO PHYSICAL THERAPY [MUW34 Custom] Comments:  
 [facility] 
eval and treat for back pain-aquatherapy and potentially dry needling if deemed an option for this patient. [phone] Follow-up Instructions Return in about 2 weeks (around 9/12/2017). Referral Information Referral ID Referred By Referred To  
  
 [id number] Zahra Wilhelm Not Available Visits Status Start Date End Date 1 New Request 8/29/17 8/29/18 If your referral has a status of pending review or denied, additional information will be sent to support the outcome of this decision. Introducing [facility] SERVICES! Gen Juarez introduces [employer] patient portal. Now you can access parts of your medical record, email your doctor's office, and request medication refills online. 1. In your internet browser, go to [email or web address] 2. Click on the First Time User? Click Here link in the Sign In box. You will see the New Member Sign Up page. 3. Enter your BudgetSimple Access Code exactly as it appears below. You will not need to use this code after youve completed the sign-up process. If you do not sign up before the expiration date, you must request a new code. · BudgetSimple Access Code: [email or web address] Expires: 9/7/2017 11:25 AM 
 
4. Enter the last four digits of your Social Security Number (xxxx) and Date of Birth (mm/dd/yyyy) as indicated and click Submit. You will be taken to the next sign-up page. 5. Create a BudgetSimple ID. This will be your BudgetSimple login ID and cannot be changed, so think of one that is secure and easy to remember. 6. Create a BudgetSimple password. You can change your password at any time. 7. Enter your Password Reset Question and Answer. This can be used at a later time if you forget your password. 8. Enter your e-mail address. You will receive e-mail notification when new information is available in [address] 9. Click Sign Up. You can now view and download portions of your medical record. 10. Click the Download Summary menu link to download a portable copy of your medical information. If you have questions, please visit the Frequently Asked Questions section of the BudgetSimple website. Remember, BudgetSimple is NOT to be used for urgent needs. For medical emergencies, dial 911. Now available from your iPhone and Android! Please provide this summary of care documentation to your next provider. Your primary care clinician is listed as Valente Agrawal. If you have any questions after today's visit, please call [phone].

## 2017-08-29 NOTE — PROGRESS NOTES
1. Have you been to the ER, urgent care clinic since your last visit? Hospitalized since your last visit? No    2. Have you seen or consulted any other health care providers outside of the [address] [place] [address] since your last visit? Include any pap smears or colon screening.  No     Chief Complaint   Patient presents with    Weight Management     Body Weight: 234.8  Body Fat%: 41.9  Muscle Mass Weight: 37.8  Body Water Weight: 35.2  Basal Metabolic Rate: 7895  BMI: 37.33

## 2017-09-03 DIAGNOSIS — E78.5 HYPERLIPIDEMIA LDL GOAL <100: ICD-10-CM

## 2017-09-05 RX ORDER — PRAVASTATIN SODIUM 40 MG/1
TABLET ORAL
Qty: 90 TAB | Refills: 0 | Status: SHIPPED | OUTPATIENT
Start: 2017-09-05 | End: 2017-12-10 | Stop reason: SDUPTHER

## 2017-09-11 ENCOUNTER — OFFICE VISIT (OUTPATIENT)
Dept: FAMILY MEDICINE CLINIC | Age: 42
End: 2017-09-11

## 2017-09-11 VITALS
SYSTOLIC BLOOD PRESSURE: 109 MMHG | HEART RATE: 82 BPM | RESPIRATION RATE: 19 BRPM | HEIGHT: 67 IN | DIASTOLIC BLOOD PRESSURE: 74 MMHG | BODY MASS INDEX: 37.01 KG/M2 | TEMPERATURE: 97.8 F | WEIGHT: 235.8 LBS | OXYGEN SATURATION: 98 %

## 2017-09-11 DIAGNOSIS — E66.9 OBESITY (BMI 35.0-39.9 WITHOUT COMORBIDITY): ICD-10-CM

## 2017-09-11 DIAGNOSIS — E11.9 WELL CONTROLLED TYPE 2 DIABETES MELLITUS (HCC): ICD-10-CM

## 2017-09-11 DIAGNOSIS — I10 HTN, GOAL BELOW 140/90: ICD-10-CM

## 2017-09-11 DIAGNOSIS — G47.33 OSA (OBSTRUCTIVE SLEEP APNEA): Primary | ICD-10-CM

## 2017-09-11 DIAGNOSIS — E78.00 HIGH CHOLESTEROL: ICD-10-CM

## 2017-09-11 NOTE — TELEPHONE ENCOUNTER
Pharmacy on file verified   Requested Prescriptions     Pending Prescriptions Disp Refills    traZODone (DESYREL) 100 mg tablet 90 Tab 3

## 2017-09-11 NOTE — MR AVS SNAPSHOT
Visit Information Date & Time Provider Department Dept. Phone Encounter #  
 9/11/2017 10:45 AM Omi Campbell MD [address] [place] [address] [id number] Upcoming Health Maintenance Date Due  
 EYE EXAM RETINAL OR DILATED Q1 9/27/1987 PAP AKA CERVICAL CYTOLOGY 9/27/1998 INFLUENZA AGE 9 TO ADULT 8/1/2017 HEMOGLOBIN A1C Q6M 10/7/2017 LIPID PANEL Q1 11/11/2017 FOOT EXAM Q1 6/9/2018 MICROALBUMIN Q1 6/9/2018 MEDICARE YEARLY EXAM 6/10/2018 DTaP/Tdap/Td series (2 - Td) 3/10/2024 Allergies as of 9/11/2017  Review Complete On: 9/11/2017 By: General Sic, LPN Severity Noted Reaction Type Reactions Pcn [Penicillins] High 05/03/2010    Swelling Swelling throat Shellfish Containing Products High 03/06/2011    Swelling Swells throat and eyes Current Immunizations  Reviewed on 5/26/2016 Name Date Tdap 3/10/2014  5:21 PM  
  
 Not reviewed this visit You Were Diagnosed With   
  
 Codes Comments SONY (obstructive sleep apnea)    -  Primary ICD-10-CM: G47.33 
ICD-9-CM: 327.23 Well controlled type 2 diabetes mellitus ([facility] [address]     ICD-10-CM: E11.9 ICD-9-CM: 250.00   
 HTN, goal below 140/90     ICD-10-CM: I10 
ICD-9-CM: 401.9 High cholesterol     ICD-10-CM: E78.00 ICD-9-CM: 272.0 Vitals BP Pulse Temp Resp Height(growth percentile) Weight(growth percentile) 109/74 82 97.8 °F (36.6 °C) (Oral) 19 5' 6.5\" (1.689 m) 235 lb 12.8 oz (107 kg) SpO2 BMI OB Status Smoking Status 98% 37.49 kg/m2 Hysterectomy Never Smoker Vitals History BMI and BSA Data Body Mass Index Body Surface Area  
 37.49 kg/m 2 2.24 m 2 Preferred Pharmacy Pharmacy Name Phone [id number], [place] - [address] [phone] Your Updated Medication List  
  
   
This list is accurate as of: 9/11/17 11:42 AM.  Always use your most recent med list.  
  
  
  
  
 atenolol 25 mg tablet Commonly known as:  TENORMIN  
take 1 tablet by mouth once daily BD LUER-MARIELY SYRINGE 3 mL 25 gauge x 1\" Syrg Generic drug:  Syringe with Needle (Disp) USE TO INJECT 1 ML EVERY 2 WEEKS Cholecalciferol (Vitamin D3) 2,000 unit Cap capsule Commonly known as:  VITAMIN D3 Take 2,000 Units by mouth daily. cyclobenzaprine 10 mg tablet Commonly known as:  FLEXERIL  
take 1 tablet by mouth three times a day if needed for muscle spasm  
  
 diclofenac EC 75 mg EC tablet Commonly known as:  VOLTAREN  
take 1 tablet by mouth twice a day FISH OIL PO Take  by mouth daily. furosemide 40 mg tablet Commonly known as:  LASIX  
take 1 tablet by mouth once daily  
  
 glimepiride 2 mg tablet Commonly known as:  AMARYL  
TAKE 1 TAB BY MOUTH TWO (2) TIMES A DAY. glucose blood VI test strips strip Commonly known as:  Ascensia CONTOUR  
E11.69 Test blood sugar 3 times a day HAIR,SKIN AND NAILS PO Take  by mouth daily. insulin glargine 100 unit/mL (3 mL) Inpn Commonly known as:  LANTUS SOLOSTAR  
30 Units by SubCUTAneous route nightly. INJECT 30 UNITS DAILY Lancets Misc E11.69  
  
 linaclotide 145 mcg Cap capsule Commonly known as:  Trenda Amabile Take 1 Cap by mouth Daily (before breakfast). loratadine 10 mg tablet Commonly known as:  Susa Fuchs Take 1 Tab by mouth nightly. metFORMIN  mg tablet Commonly known as:  GLUCOPHAGE XR  
take 1 tablet by mouth once daily  
  
 montelukast 10 mg tablet Commonly known as:  SINGULAIR  
take 1 tablet by mouth once daily MULTIVITAMIN PO Take  by mouth daily. mupirocin 2 % ointment Commonly known as:  [employer] Apply  to affected area daily. NYSTOP powder Generic drug:  nystatin  
two (2) times a day. oxyCODONE IR 5 mg immediate release tablet Commonly known as:  Chay Agudelo  
 Take 1-2 Tabs by mouth every four (4) hours as needed. Max Daily Amount: 60 mg.  
  
 phentermine 37.5 mg tablet Commonly known as:  ADIPEX-P Take 1 Tab by mouth every morning. Max Daily Amount: 37.5 mg.  
  
 polyethylene glycol 17 gram packet Commonly known as:  Lugene Minder Take 1 Packet by mouth daily. * potassium chloride SR 20 mEq tablet Commonly known as:  K-TAB  
TAKE ONE TABLET BY MOUTH DAILY * potassium chloride 20 mEq tablet Commonly known as:  K-DUR, KLOR-CON  
take 1 tablet by mouth once daily  
  
 pravastatin 40 mg tablet Commonly known as:  PRAVACHOL  
TAKE 1 TABLET BY MOUTH IN THE EVENING  
  
 topiramate 25 mg tablet Commonly known as:  TOPAMAX Take 1 Tab by mouth two (2) times daily (with meals). traZODone 100 mg tablet Commonly known as:  DESYREL  
take 1 tablet by mouth at bedtime VITAMIN E PO Take  by mouth daily. * Notice: This list has 2 medication(s) that are the same as other medications prescribed for you. Read the directions carefully, and ask your doctor or other care provider to review them with you. Introducing \[facility]\"" & HEALTH SERVICES! [facility] introduces [employer]Game patient portal. Now you can access parts of your medical record, email your doctor's office, and request medication refills online. 1. In your internet browser, go to [email or web address] 2. Click on the First Time User? Click Here link in the Sign In box. You will see the New Member Sign Up page. 3. Enter your EthicsGame Access Code exactly as it appears below. You will not need to use this code after youve completed the sign-up process. If you do not sign up before the expiration date, you must request a new code. · EthicsGame Access Code: [email or web address] Expires: 12/10/2017 11:42 AM 
 
4. Enter the last four digits of your Social Security Number (xxxx) and Date of Birth (mm/dd/yyyy) as indicated and click Submit.  You will be taken to the next sign-up page. 5. Create a DrFirst ID. This will be your DrFirst login ID and cannot be changed, so think of one that is secure and easy to remember. 6. Create a DrFirst password. You can change your password at any time. 7. Enter your Password Reset Question and Answer. This can be used at a later time if you forget your password. 8. Enter your e-mail address. You will receive e-mail notification when new information is available in [address] 9. Click Sign Up. You can now view and download portions of your medical record. 10. Click the Download Summary menu link to download a portable copy of your medical information. If you have questions, please visit the Frequently Asked Questions section of the DrFirst website. Remember, DrFirst is NOT to be used for urgent needs. For medical emergencies, dial 911. Now available from your iPhone and Android! Please provide this summary of care documentation to your next provider. Your primary care clinician is listed as Veronica Lopez. If you have any questions after today's visit, please call [phone].

## 2017-09-11 NOTE — PROGRESS NOTES
Weight Loss Progress Note: follow up Physician Visit      Vincent Patton is a 46 y.o. female with BMI ,  who is here for her follow up  Evaluation for the medical bariatric care. She cannot recall the last time she had a BM  She has not tried any of the usual things like miralax or mag citrate  She has been off of the linzess for more than a week. It might be a week with no BM  She is taking the phentermine   She walked one time  CC: I want to be healthier    Weight History  Current weight  235 and BMI is Body mass index is 37.49 kg/(m^2). Goal weight    Highest weight  (See weight gain time line scanned into media section of chart)        Significant Medical History    Update on sleep apnea and  CPAP no    Ever had bariatric surgery: no    Pregnant or planning on becoming pregnant w/in 6 months: no     If fem    Significant Psychosocial History   Any history of drug abuse or dependence: no  Current Major Lifestyle Changes: no  Any potential unsupportive people: no          Social History  Social History   Substance Use Topics    Smoking status: Never Smoker    Smokeless tobacco: Never Used    Alcohol use Yes      Comment: RARE     How many times a week do you eat out? Do you drink any EtOH?  no   If so, how much? Do you have upcoming any travel in the next 6 weeks?  no   If so, what do you have planned?           Exercise  How many days a week do you currently exercise?  0 days  Have you ever been told by a physician not to exercise?  no      Objective  Visit Vitals    /74    Pulse 82    Temp 97.8 °F (36.6 °C) (Oral)    Resp 19    Ht 5' 6.5\" (1.689 m)    Wt 235 lb 12.8 oz (107 kg)    SpO2 98%    BMI 37.49 kg/m2       Bicep - (right ) circumference  Waist Circumference: See Weight Management Doc Flowsheet  Neck Circumference: See Weight Management Doc Flowsheet  Percent Body Fat: See Weight Management Doc Flowsheet  Weight Metrics 9/11/2017 9/11/2017 8/29/2017 8/29/2017 7/26/2017 7/26/2017 6/13/2017   Weight - 235 lb 12.8 oz - 234 lb 12.8 oz - 240 lb 3.2 oz 238 lb   Neck Circ (inches) - - 15 - - - -   Waist Measure Inches 47.5 - 45 - 47 - -   Exercise Mins/week - - - - 0 - -   Body Fat % 42.1 - 41.9 - 42.5 - -   BMI - 37.49 kg/m2 - 37.33 kg/m2 - 38.19 kg/m2 39.61 kg/m2         Labs:     Review of Systems  Complete ROS negative except where noted above      Physical Exam    Vital Signs Reviewed  Weight Management Metrics Reviewed    Vital Signs Reviewed  Appearance: Obese, A&O x 3, NAD  HEENT:  NC/AT, EOMI, PERRL, No scleral icterus, malampatti score:4  Skin:    Skin tags - no   Acanthosis Nigricans - no  Neck:  No nodes, thyromegaly   Heart:  RRR without M/R/G  Lungs:  CTAB, no rhonchi, rales, or wheezes with good air exchange   Abdomen:  Non-tender, pos bowel sounds; hepatomegaly -   Ext:  No C/C/E        Assessment & Plan  Diagnoses and all orders for this visit:    1. SONY (obstructive sleep apnea)  Cont cpap  2. Obesity (BMI 35.0-39.9 without comorbidity)  Diet:day 10-14      Activity:increase daily activity. Aim for 4 days of intentional exercise    Medication: phentermine  3. Well controlled type 2 diabetes mellitus ([address]  Cont same meds for ow. Hoping to DC some of the meds soon  4. HTN, goal below 140/90  Great control, no change yet  5. High cholesterol  Cont to monitor    Based on his history and exam, Yvette Alonso is a good candidate for the  [facility]         There are no Patient Instructions on file for this visit. Follow-up Disposition:  Return in about 1 week (around 9/18/2017). Over 50% of the 30 minutes face to face time with Kel consisted of counseling & coordinating and/or discussing treatment plans in reference to her obesity The primary encounter diagnosis was SONY (obstructive sleep apnea).  Diagnoses of Obesity (BMI 35.0-39.9 without comorbidity), Well controlled type 2 diabetes mellitus ([address] HTN, goal below 140/90, and High cholesterol were also pertinent to this visit.   The patient verbalizes understanding and agrees with the plan of care    Patient has the advanced directives  booklet to review

## 2017-09-11 NOTE — PROGRESS NOTES
1. Have you been to the ER, urgent care clinic since your last visit? Hospitalized since your last visit? No    2. Have you seen or consulted any other health care providers outside of the [address] [place] [address] since your last visit? Include any pap smears or colon screening.  No     Chief Complaint   Patient presents with    Weight Management     Body Weight: 235.8  Body Fat%: 42.1  Muscle Mass Weight: 38.0  Body Water Weight: 37.2  Basal Metabolic Rate: 0490  BMI: 37.49

## 2017-09-13 RX ORDER — TRAZODONE HYDROCHLORIDE 100 MG/1
TABLET ORAL
Qty: 90 TAB | Refills: 3 | Status: SHIPPED | OUTPATIENT
Start: 2017-09-13 | End: 2018-05-01 | Stop reason: SDUPTHER

## 2017-10-04 ENCOUNTER — HOSPITAL ENCOUNTER (OUTPATIENT)
Dept: PREADMISSION TESTING | Age: 42
Discharge: HOME | End: 2017-10-04
Payer: MEDICARE

## 2017-10-04 VITALS
BODY MASS INDEX: 38.81 KG/M2 | OXYGEN SATURATION: 100 % | SYSTOLIC BLOOD PRESSURE: 127 MMHG | WEIGHT: 241.5 LBS | DIASTOLIC BLOOD PRESSURE: 82 MMHG | RESPIRATION RATE: 18 BRPM | HEART RATE: 70 BPM | TEMPERATURE: 98.1 F | HEIGHT: 66 IN

## 2017-10-04 LAB
ABO + RH BLD: NORMAL
ANION GAP SERPL CALC-SCNC: 10 MMOL/L (ref 5–15)
APPEARANCE UR: ABNORMAL
ATRIAL RATE: 69 BPM
BACTERIA URNS QL MICRO: ABNORMAL /HPF
BILIRUB UR QL: NEGATIVE
BLOOD GROUP ANTIBODIES SERPL: NORMAL
BUN SERPL-MCNC: 10 MG/DL (ref 6–20)
BUN/CREAT SERPL: 14 (ref 12–20)
CALCIUM SERPL-MCNC: 8.5 MG/DL (ref 8.5–10.1)
CALCULATED P AXIS, ECG09: 31 DEGREES
CALCULATED R AXIS, ECG10: -3 DEGREES
CALCULATED T AXIS, ECG11: 0 DEGREES
CHLORIDE SERPL-SCNC: 110 MMOL/L (ref 97–108)
CO2 SERPL-SCNC: 20 MMOL/L (ref 21–32)
COLOR UR: ABNORMAL
CREAT SERPL-MCNC: 0.71 MG/DL (ref 0.55–1.02)
DIAGNOSIS, 93000: NORMAL
EPITH CASTS URNS QL MICRO: ABNORMAL /LPF
ERYTHROCYTE [DISTWIDTH] IN BLOOD BY AUTOMATED COUNT: 12.7 % (ref 11.5–14.5)
EST. AVERAGE GLUCOSE BLD GHB EST-MCNC: 114 MG/DL
GLUCOSE SERPL-MCNC: 91 MG/DL (ref 65–100)
GLUCOSE UR STRIP.AUTO-MCNC: NEGATIVE MG/DL
HBA1C MFR BLD: 5.6 % (ref 4.2–6.3)
HCT VFR BLD AUTO: 35.6 % (ref 35–47)
HGB BLD-MCNC: 11.8 G/DL (ref 11.5–16)
HGB UR QL STRIP: NEGATIVE
INR PPP: 1 (ref 0.9–1.1)
KETONES UR QL STRIP.AUTO: NEGATIVE MG/DL
LEUKOCYTE ESTERASE UR QL STRIP.AUTO: NEGATIVE
MCH RBC QN AUTO: 28.6 PG (ref 26–34)
MCHC RBC AUTO-ENTMCNC: 33.1 G/DL (ref 30–36.5)
MCV RBC AUTO: 86.2 FL (ref 80–99)
MUCOUS THREADS URNS QL MICRO: ABNORMAL /LPF
NITRITE UR QL STRIP.AUTO: NEGATIVE
P-R INTERVAL, ECG05: 166 MS
PH UR STRIP: 5.5 [PH] (ref 5–8)
PLATELET # BLD AUTO: 310 K/UL (ref 150–400)
POTASSIUM SERPL-SCNC: 4.1 MMOL/L (ref 3.5–5.1)
PROT UR STRIP-MCNC: NEGATIVE MG/DL
PROTHROMBIN TIME: 10.1 SEC (ref 9–11.1)
Q-T INTERVAL, ECG07: 398 MS
QRS DURATION, ECG06: 100 MS
QTC CALCULATION (BEZET), ECG08: 426 MS
RBC # BLD AUTO: 4.13 M/UL (ref 3.8–5.2)
RBC #/AREA URNS HPF: ABNORMAL /HPF (ref 0–5)
SODIUM SERPL-SCNC: 140 MMOL/L (ref 136–145)
SP GR UR REFRACTOMETRY: 1.03 (ref 1–1.03)
SPECIMEN EXP DATE BLD: NORMAL
UA: UC IF INDICATED,UAUC: ABNORMAL
UROBILINOGEN UR QL STRIP.AUTO: 0.2 EU/DL (ref 0.2–1)
VENTRICULAR RATE, ECG03: 69 BPM
WBC # BLD AUTO: 5.2 K/UL (ref 3.6–11)
WBC URNS QL MICRO: ABNORMAL /HPF (ref 0–4)

## 2017-10-04 PROCEDURE — 85027 COMPLETE CBC AUTOMATED: CPT | Performed by: ORTHOPAEDIC SURGERY

## 2017-10-04 PROCEDURE — 85610 PROTHROMBIN TIME: CPT | Performed by: ORTHOPAEDIC SURGERY

## 2017-10-04 PROCEDURE — 36415 COLL VENOUS BLD VENIPUNCTURE: CPT | Performed by: ORTHOPAEDIC SURGERY

## 2017-10-04 PROCEDURE — 86900 BLOOD TYPING SEROLOGIC ABO: CPT | Performed by: ORTHOPAEDIC SURGERY

## 2017-10-04 PROCEDURE — 81001 URINALYSIS AUTO W/SCOPE: CPT | Performed by: ORTHOPAEDIC SURGERY

## 2017-10-04 PROCEDURE — 80048 BASIC METABOLIC PNL TOTAL CA: CPT | Performed by: ORTHOPAEDIC SURGERY

## 2017-10-04 PROCEDURE — 83036 HEMOGLOBIN GLYCOSYLATED A1C: CPT | Performed by: ORTHOPAEDIC SURGERY

## 2017-10-04 PROCEDURE — 84703 CHORIONIC GONADOTROPIN ASSAY: CPT | Performed by: ORTHOPAEDIC SURGERY

## 2017-10-04 PROCEDURE — 93005 ELECTROCARDIOGRAM TRACING: CPT

## 2017-10-04 PROCEDURE — 87086 URINE CULTURE/COLONY COUNT: CPT | Performed by: ORTHOPAEDIC SURGERY

## 2017-10-04 RX ORDER — TRAMADOL HYDROCHLORIDE 50 MG/1
50 TABLET ORAL
Status: ON HOLD | COMMUNITY
End: 2017-11-07

## 2017-10-04 NOTE — PROGRESS NOTES
Problem: Patient Education: Go to Patient Education Activity  Goal: Patient/Family Education  Outcome: Progressing Towards Goal  Attended pre-op spine class with . Questions, concerns, and comments were addressed.

## 2017-10-05 LAB
BACTERIA SPEC CULT: NORMAL
CC UR VC: NORMAL
HCG SERPL QL: NEGATIVE
SERVICE CMNT-IMP: NORMAL
SERVICE CMNT-IMP: NORMAL

## 2017-10-09 ENCOUNTER — OFFICE VISIT (OUTPATIENT)
Dept: FAMILY MEDICINE CLINIC | Age: 42
End: 2017-10-09

## 2017-10-09 VITALS
WEIGHT: 236.6 LBS | TEMPERATURE: 98.4 F | RESPIRATION RATE: 17 BRPM | OXYGEN SATURATION: 97 % | BODY MASS INDEX: 38.02 KG/M2 | DIASTOLIC BLOOD PRESSURE: 77 MMHG | SYSTOLIC BLOOD PRESSURE: 115 MMHG | HEIGHT: 66 IN | HEART RATE: 83 BPM

## 2017-10-09 DIAGNOSIS — Z01.818 PREOPERATIVE CLEARANCE: Primary | ICD-10-CM

## 2017-10-09 DIAGNOSIS — E66.9 OBESITY (BMI 30-39.9): ICD-10-CM

## 2017-10-09 DIAGNOSIS — I10 HTN, GOAL BELOW 140/90: ICD-10-CM

## 2017-10-09 DIAGNOSIS — E66.01 MORBID OBESITY (HCC): ICD-10-CM

## 2017-10-09 DIAGNOSIS — E11.9 WELL CONTROLLED TYPE 2 DIABETES MELLITUS (HCC): ICD-10-CM

## 2017-10-09 DIAGNOSIS — G47.33 OSA (OBSTRUCTIVE SLEEP APNEA): ICD-10-CM

## 2017-10-09 RX ORDER — TOPIRAMATE 25 MG/1
TABLET ORAL
Qty: 60 TAB | Refills: 4 | Status: SHIPPED | OUTPATIENT
Start: 2017-10-09 | End: 2017-12-19 | Stop reason: SDUPTHER

## 2017-10-09 NOTE — PROGRESS NOTES
Chief Complaint   Patient presents with    Weight Management    Form Completion     surgeon clearance     she is a 38y.o. year old female who presents for evalution. She is here for a preop clearance for Lumbar laminectomy  She has a h/o SONY and she was not able to afford the machine    Reviewed PmHx, RxHx, FmHx, SocHx, AllgHx and updated and dated in the chart.     Aspirin yes ____   No____ N/A____    Patient Active Problem List    Diagnosis    Well controlled type 2 diabetes mellitus ([address]    Cervical stenosis of spine    Chronic neck pain    SONY (obstructive sleep apnea)    DDD (degenerative disc disease), cervical     followed by ortho      Arthritis     lower back      Asthma     last attack 1994      Bipolar 1 disorder ([facility] [address]    GERD (gastroesophageal reflux disease)    High cholesterol    HTN, goal below 140/90    Migraines    Morbid obesity ([facility] [address]    Unspecified sleep apnea     no cpap-NEVER TESTED      Diabetes mellitus type 2, controlled ([facility] [address]     a1c 7.4% at outside EMR         Nurse notes were reviewed and copied and are correct  Review of Systems - negative except as listed above in the HPI    Objective:     Vitals:    10/09/17 1050   BP: 115/77   Pulse: 83   Resp: 17   Temp: 98.4 °F (36.9 °C)   TempSrc: Oral   SpO2: 97%   Weight: 236 lb 9.6 oz (107.3 kg)   Height: 5' 6\" (1.676 m)        Physical Examination: General appearance - alert, well appearing, and in no distress  Neck - supple, no significant adenopathy  Lymphatics - no palpable lymphadenopathy, no hepatosplenomegaly  Chest - clear to auscultation, no wheezes, rales or rhonchi, symmetric air entry  Heart - normal rate, regular rhythm, normal S1, S2, no murmurs, rubs, clicks or gallops  Abdomen - soft, nontender, nondistended, no masses or organomegaly  Musculoskeletal - no joint tenderness, deformity or swelling  Extremities - peripheral pulses normal, no pedal edema, no clubbing or cyanosis  Skin - normal coloration and turgor, no rashes, no suspicious skin lesions noted      Assessment/ Plan:   Diagnoses and all orders for this visit:    1. Preoperative clearance  Optimized for surgery  2. Morbid obesity ([facility] [address]   will address post op  3. Well controlled type 2 diabetes mellitus ([facility] [address]  Cont current plan  4. HTN, goal below 140/90  Well controlled . No change in meds. 5. SONY (obstructive sleep apnea)  Trying to get machine     Follow-up Disposition: Not on File    ICD-10-CM ICD-9-CM    1. Preoperative clearance Z01.818 V72.84    2. Morbid obesity ([facility] [address] E66.01 278.01    3. Well controlled type 2 diabetes mellitus ([facility] E11.9 250.00    4. HTN, goal below 140/90 I10 401.9    5. SONY (obstructive sleep apnea) G47.33 327.23        I have discussed the diagnosis with the patient and the intended plan as seen in the above orders. The patient has received an after-visit summary and questions were answered concerning future plans. Medication Side Effects and Warnings were discussed with patient: yes  Patient Labs were reviewed and or requested: yes  Patient Past Records were reviewed and or requested: yes        There are no Patient Instructions on file for this visit.     The patient verbalizes understanding and agrees with the plan of care        Patient has the advanced directives booklet to review

## 2017-10-09 NOTE — MR AVS SNAPSHOT
Visit Information Date & Time Provider Department Dept. Phone Encounter #  
 10/9/2017 10:15 AM Lanae Osgood, MD [address] [place] [address] [id number] Upcoming Health Maintenance Date Due  
 EYE EXAM RETINAL OR DILATED Q1 9/27/1987 PAP AKA CERVICAL CYTOLOGY 9/27/1998 INFLUENZA AGE 9 TO ADULT 8/1/2017 LIPID PANEL Q1 11/11/2017 HEMOGLOBIN A1C Q6M 4/4/2018 FOOT EXAM Q1 6/9/2018 MICROALBUMIN Q1 6/9/2018 MEDICARE YEARLY EXAM 6/10/2018 DTaP/Tdap/Td series (2 - Td) 3/10/2024 Allergies as of 10/9/2017  Review Complete On: 10/9/2017 By: Lanae Osgood, MD  
  
 Severity Noted Reaction Type Reactions Pcn [Penicillins] High 05/03/2010    Swelling Swelling throat Shellfish Containing Products High 03/06/2011    Swelling Swells throat and eyes Morphine  10/04/2017    Other (comments) FAST HEARTBEAT AND NAUSEA PER PATIENT Current Immunizations  Reviewed on 5/26/2016 Name Date Tdap 3/10/2014  5:21 PM  
  
 Not reviewed this visit You Were Diagnosed With   
  
 Codes Comments Preoperative clearance    -  Primary ICD-10-CM: Q12.758 ICD-9-CM: V72.84 Morbid obesity ([facility]     ICD-10-CM: E66.01 
ICD-9-CM: 278.01 Well controlled type 2 diabetes mellitus ([facility]     ICD-10-CM: E11.9 ICD-9-CM: 250.00   
 HTN, goal below 140/90     ICD-10-CM: I10 
ICD-9-CM: 401.9 SONY (obstructive sleep apnea)     ICD-10-CM: G47.33 
ICD-9-CM: 327.23 Vitals BP Pulse Temp Resp Height(growth percentile) Weight(growth percentile) 115/77 83 98.4 °F (36.9 °C) (Oral) 17 5' 6\" (1.676 m) 236 lb 9.6 oz (107.3 kg) SpO2 BMI OB Status Smoking Status 97% 38.19 kg/m2 Hysterectomy Never Smoker Vitals History BMI and BSA Data Body Mass Index Body Surface Area  
 38.19 kg/m 2 2.24 m 2 Preferred Pharmacy Pharmacy Name Phone [id number]- [place], [place] - [address] [facility] [address] [phone] Your Updated Medication List  
  
   
This list is accurate as of: 10/9/17  4:14 PM.  Always use your most recent med list.  
  
  
  
  
 atenolol 25 mg tablet Commonly known as:  TENORMIN  
take 1 tablet by mouth once daily BD LUER-MARIELY SYRINGE 3 mL 25 gauge x 1\" Syrg Generic drug:  Syringe with Needle (Disp) USE TO INJECT 1 ML EVERY 2 WEEKS Cholecalciferol (Vitamin D3) 2,000 unit Cap capsule Commonly known as:  VITAMIN D3 Take 2,000 Units by mouth daily. cyclobenzaprine 10 mg tablet Commonly known as:  FLEXERIL  
take 1 tablet by mouth three times a day if needed for muscle spasm  
  
 diclofenac EC 75 mg EC tablet Commonly known as:  VOLTAREN  
take 1 tablet by mouth twice a day FISH OIL PO Take  by mouth daily. furosemide 40 mg tablet Commonly known as:  LASIX  
take 1 tablet by mouth once daily  
  
 glimepiride 2 mg tablet Commonly known as:  AMARYL  
TAKE 1 TAB BY MOUTH TWO (2) TIMES A DAY. glucose blood VI test strips strip Commonly known as:  Ascensia CONTOUR  
E11.69 Test blood sugar 3 times a day HAIR,SKIN AND NAILS PO Take  by mouth daily. insulin glargine 100 unit/mL (3 mL) Inpn Commonly known as:  LANTUS SOLOSTAR  
30 Units by SubCUTAneous route nightly. INJECT 30 UNITS DAILY Lancets Misc E11.69  
  
 linaclotide 145 mcg Cap capsule Commonly known as:  Matt Filbert Take 1 Cap by mouth Daily (before breakfast). loratadine 10 mg tablet Commonly known as:  Drena Magic Take 1 Tab by mouth nightly. metFORMIN  mg tablet Commonly known as:  GLUCOPHAGE XR  
take 1 tablet by mouth once daily  
  
 montelukast 10 mg tablet Commonly known as:  SINGULAIR  
take 1 tablet by mouth once daily MULTIVITAMIN PO Take  by mouth daily. NYSTOP powder Generic drug:  nystatin  
two (2) times a day. phentermine 37.5 mg tablet Commonly known as:  ADIPEX-P  
 Take 1 Tab by mouth every morning. Max Daily Amount: 37.5 mg.  
  
 polyethylene glycol 17 gram packet Commonly known as:  Shonna Gu Take 1 Packet by mouth daily. potassium chloride 20 mEq tablet Commonly known as:  K-DUR, KLOR-CON  
take 1 tablet by mouth once daily  
  
 pravastatin 40 mg tablet Commonly known as:  PRAVACHOL  
TAKE 1 TABLET BY MOUTH IN THE EVENING  
  
 topiramate 25 mg tablet Commonly known as:  TOPAMAX TAKE 1 TABLET TWICE A DAY WITH MEALS  
  
 traMADol 50 mg tablet Commonly known as:  ULTRAM  
Take 50 mg by mouth every six (6) hours as needed for Pain. traZODone 100 mg tablet Commonly known as:  DESYREL  
take 1 tablet by mouth at bedtime VITAMIN E PO Take  by mouth daily. Introducing [facility]! [employer] introduces [employer] patient portal. Now you can access parts of your medical record, email your doctor's office, and request medication refills online. 1. In your internet browser, go to [email or web address] 2. Click on the First Time User? Click Here link in the Sign In box. You will see the New Member Sign Up page. 3. Enter your [employer] Access Code exactly as it appears below. You will not need to use this code after youve completed the sign-up process. If you do not sign up before the expiration date, you must request a new code. · [employer] Access Code: [email or web address] Expires: 12/10/2017 11:42 AM 
 
4. Enter the last four digits of your Social Security Number (xxxx) and Date of Birth (mm/dd/yyyy) as indicated and click Submit. You will be taken to the next sign-up page. 5. Create a [employer] ID. This will be your [employer] login ID and cannot be changed, so think of one that is secure and easy to remember. 6. Create a [employer] password. You can change your password at any time. 7. Enter your Password Reset Question and Answer. This can be used at a later time if you forget your password. 8. Enter your e-mail address. You will receive e-mail notification when new information is available in [address] 9. Click Sign Up. You can now view and download portions of your medical record. 10. Click the Download Summary menu link to download a portable copy of your medical information. If you have questions, please visit the Frequently Asked Questions section of the [employer] website. Remember, [employer] is NOT to be used for urgent needs. For medical emergencies, dial 911. Now available from your iPhone and Android! Please provide this summary of care documentation to your next provider. Your primary care clinician is listed as June Friar. If you have any questions after today's visit, please call [phone].

## 2017-10-09 NOTE — PROGRESS NOTES
1. Have you been to the ER, urgent care clinic since your last visit? Hospitalized since your last visit? No    2. Have you seen or consulted any other health care providers outside of the [address] [place] [address] since your last visit? Include any pap smears or colon screening. Michael Bone orthopedic surgeon.     Chief Complaint   Patient presents with    Weight Management    Form Completion     surgeon clearance       Body Weight: 236.6  Body Fat%: 42.3  Muscle Mass Weight: 38.1  Body Water Weight: 94.9  Basal Metabolic Rate: 1833  BMI: 38.19

## 2017-11-06 ENCOUNTER — ANESTHESIA EVENT (OUTPATIENT)
Dept: SURGERY | Age: 42
End: 2017-11-06
Payer: MEDICARE

## 2017-11-07 ENCOUNTER — APPOINTMENT (OUTPATIENT)
Dept: GENERAL RADIOLOGY | Age: 42
End: 2017-11-07
Attending: ORTHOPAEDIC SURGERY
Payer: MEDICARE

## 2017-11-07 ENCOUNTER — HOSPITAL ENCOUNTER (OUTPATIENT)
Age: 42
Setting detail: OBSERVATION
Discharge: HOME HEALTH | End: 2017-11-10
Attending: ORTHOPAEDIC SURGERY | Admitting: ORTHOPAEDIC SURGERY
Payer: MEDICARE

## 2017-11-07 ENCOUNTER — ANESTHESIA (OUTPATIENT)
Dept: SURGERY | Age: 42
End: 2017-11-07
Payer: MEDICARE

## 2017-11-07 DIAGNOSIS — G89.29 CHRONIC NECK PAIN: ICD-10-CM

## 2017-11-07 DIAGNOSIS — M54.2 CHRONIC NECK PAIN: ICD-10-CM

## 2017-11-07 PROBLEM — M51.36 DDD (DEGENERATIVE DISC DISEASE), LUMBAR: Status: ACTIVE | Noted: 2017-11-07

## 2017-11-07 LAB
ABO + RH BLD: NORMAL
BLOOD GROUP ANTIBODIES SERPL: NORMAL
GLUCOSE BLD STRIP.AUTO-MCNC: 75 MG/DL (ref 65–100)
GLUCOSE BLD STRIP.AUTO-MCNC: 82 MG/DL (ref 65–100)
SERVICE CMNT-IMP: NORMAL
SERVICE CMNT-IMP: NORMAL
SPECIMEN EXP DATE BLD: NORMAL

## 2017-11-07 PROCEDURE — 77030034475 HC MISC IMPL SPN: Performed by: ORTHOPAEDIC SURGERY

## 2017-11-07 PROCEDURE — C1713 ANCHOR/SCREW BN/BN,TIS/BN: HCPCS | Performed by: ORTHOPAEDIC SURGERY

## 2017-11-07 PROCEDURE — 72020 X-RAY EXAM OF SPINE 1 VIEW: CPT

## 2017-11-07 PROCEDURE — 77030029099 HC BN WAX SSPC -A: Performed by: ORTHOPAEDIC SURGERY

## 2017-11-07 PROCEDURE — 74011000250 HC RX REV CODE- 250

## 2017-11-07 PROCEDURE — 74011250636 HC RX REV CODE- 250/636: Performed by: ORTHOPAEDIC SURGERY

## 2017-11-07 PROCEDURE — 77030012893

## 2017-11-07 PROCEDURE — 77030034850: Performed by: ORTHOPAEDIC SURGERY

## 2017-11-07 PROCEDURE — 77030002924 HC SUT GORTX WLGO -B: Performed by: ORTHOPAEDIC SURGERY

## 2017-11-07 PROCEDURE — C1762 CONN TISS, HUMAN(INC FASCIA): HCPCS | Performed by: ORTHOPAEDIC SURGERY

## 2017-11-07 PROCEDURE — 76010000171 HC OR TIME 2 TO 2.5 HR INTENSV-TIER 1: Performed by: ORTHOPAEDIC SURGERY

## 2017-11-07 PROCEDURE — 76210000017 HC OR PH I REC 1.5 TO 2 HR: Performed by: ORTHOPAEDIC SURGERY

## 2017-11-07 PROCEDURE — 74011250637 HC RX REV CODE- 250/637: Performed by: PHYSICIAN ASSISTANT

## 2017-11-07 PROCEDURE — 77030037728 HC GRFT BN FBR CORT 3DEMIN 30CC BACT -I: Performed by: ORTHOPAEDIC SURGERY

## 2017-11-07 PROCEDURE — 77030012406 HC DRN WND PENRS BARD -A: Performed by: ORTHOPAEDIC SURGERY

## 2017-11-07 PROCEDURE — 77030032490 HC SLV COMPR SCD KNE COVD -B: Performed by: ORTHOPAEDIC SURGERY

## 2017-11-07 PROCEDURE — 74011250636 HC RX REV CODE- 250/636

## 2017-11-07 PROCEDURE — 77030013079 HC BLNKT BAIR HGGR 3M -A: Performed by: ANESTHESIOLOGY

## 2017-11-07 PROCEDURE — 77030018836 HC SOL IRR NACL ICUM -A: Performed by: ORTHOPAEDIC SURGERY

## 2017-11-07 PROCEDURE — 77030020782 HC GWN BAIR PAWS FLX 3M -B

## 2017-11-07 PROCEDURE — 76060000035 HC ANESTHESIA 2 TO 2.5 HR: Performed by: ORTHOPAEDIC SURGERY

## 2017-11-07 PROCEDURE — 36415 COLL VENOUS BLD VENIPUNCTURE: CPT | Performed by: ORTHOPAEDIC SURGERY

## 2017-11-07 PROCEDURE — 77030034479 HC ADH SKN CLSR PRINEO J&J -B: Performed by: ORTHOPAEDIC SURGERY

## 2017-11-07 PROCEDURE — 77030004391 HC BUR FLUT MEDT -C: Performed by: ORTHOPAEDIC SURGERY

## 2017-11-07 PROCEDURE — 77030026438 HC STYL ET INTUB CARD -A: Performed by: ANESTHESIOLOGY

## 2017-11-07 PROCEDURE — 77030031139 HC SUT VCRL2 J&J -A: Performed by: ORTHOPAEDIC SURGERY

## 2017-11-07 PROCEDURE — 77030003194 HC GRFT RECOMB BN MEDT -I1: Performed by: ORTHOPAEDIC SURGERY

## 2017-11-07 PROCEDURE — 86900 BLOOD TYPING SEROLOGIC ABO: CPT | Performed by: ORTHOPAEDIC SURGERY

## 2017-11-07 PROCEDURE — 74011250636 HC RX REV CODE- 250/636: Performed by: ANESTHESIOLOGY

## 2017-11-07 PROCEDURE — 82962 GLUCOSE BLOOD TEST: CPT

## 2017-11-07 PROCEDURE — 74011250636 HC RX REV CODE- 250/636: Performed by: PHYSICIAN ASSISTANT

## 2017-11-07 PROCEDURE — 77030008684 HC TU ET CUF COVD -B: Performed by: ANESTHESIOLOGY

## 2017-11-07 PROCEDURE — 99218 HC RM OBSERVATION: CPT

## 2017-11-07 PROCEDURE — 77030014007 HC SPNG HEMSTAT J&J -B: Performed by: ORTHOPAEDIC SURGERY

## 2017-11-07 DEVICE — 5.5 LOCKING CAP, CREO
Type: IMPLANTABLE DEVICE | Site: SPINE LUMBAR | Status: FUNCTIONAL
Brand: CREO

## 2017-11-07 DEVICE — BONE GRAFT KIT 7510200 INFUSE SMALL
Type: IMPLANTABLE DEVICE | Site: SPINE LUMBAR | Status: FUNCTIONAL
Brand: INFUSE® BONE GRAFT

## 2017-11-07 RX ORDER — MIDAZOLAM HYDROCHLORIDE 1 MG/ML
1 INJECTION, SOLUTION INTRAMUSCULAR; INTRAVENOUS AS NEEDED
Status: DISCONTINUED | OUTPATIENT
Start: 2017-11-07 | End: 2017-11-07 | Stop reason: HOSPADM

## 2017-11-07 RX ORDER — FENTANYL CITRATE 50 UG/ML
INJECTION, SOLUTION INTRAMUSCULAR; INTRAVENOUS AS NEEDED
Status: DISCONTINUED | OUTPATIENT
Start: 2017-11-07 | End: 2017-11-07 | Stop reason: HOSPADM

## 2017-11-07 RX ORDER — ACETAMINOPHEN 325 MG/1
650 TABLET ORAL EVERY 6 HOURS
Status: DISCONTINUED | OUTPATIENT
Start: 2017-11-08 | End: 2017-11-10 | Stop reason: HOSPADM

## 2017-11-07 RX ORDER — SODIUM CHLORIDE 0.9 % (FLUSH) 0.9 %
5-10 SYRINGE (ML) INJECTION AS NEEDED
Status: DISCONTINUED | OUTPATIENT
Start: 2017-11-07 | End: 2017-11-09 | Stop reason: SDUPTHER

## 2017-11-07 RX ORDER — VANCOMYCIN 2 GRAM/500 ML IN 0.9 % SODIUM CHLORIDE INTRAVENOUS
2000 ONCE
Status: COMPLETED | OUTPATIENT
Start: 2017-11-07 | End: 2017-11-07

## 2017-11-07 RX ORDER — ONDANSETRON 2 MG/ML
4 INJECTION INTRAMUSCULAR; INTRAVENOUS AS NEEDED
Status: DISCONTINUED | OUTPATIENT
Start: 2017-11-07 | End: 2017-11-07 | Stop reason: HOSPADM

## 2017-11-07 RX ORDER — POTASSIUM CHLORIDE 750 MG/1
20 TABLET, FILM COATED, EXTENDED RELEASE ORAL DAILY
Status: DISCONTINUED | OUTPATIENT
Start: 2017-11-08 | End: 2017-11-10 | Stop reason: HOSPADM

## 2017-11-07 RX ORDER — DEXTROSE, SODIUM CHLORIDE, SODIUM LACTATE, POTASSIUM CHLORIDE, AND CALCIUM CHLORIDE 5; .6; .31; .03; .02 G/100ML; G/100ML; G/100ML; G/100ML; G/100ML
25 INJECTION, SOLUTION INTRAVENOUS CONTINUOUS
Status: DISCONTINUED | OUTPATIENT
Start: 2017-11-07 | End: 2017-11-07 | Stop reason: HOSPADM

## 2017-11-07 RX ORDER — FENTANYL CITRATE 50 UG/ML
50 INJECTION, SOLUTION INTRAMUSCULAR; INTRAVENOUS AS NEEDED
Status: DISCONTINUED | OUTPATIENT
Start: 2017-11-07 | End: 2017-11-07 | Stop reason: HOSPADM

## 2017-11-07 RX ORDER — LIDOCAINE HYDROCHLORIDE 10 MG/ML
0.1 INJECTION, SOLUTION EPIDURAL; INFILTRATION; INTRACAUDAL; PERINEURAL AS NEEDED
Status: DISCONTINUED | OUTPATIENT
Start: 2017-11-07 | End: 2017-11-07 | Stop reason: HOSPADM

## 2017-11-07 RX ORDER — ROPIVACAINE HYDROCHLORIDE 5 MG/ML
30 INJECTION, SOLUTION EPIDURAL; INFILTRATION; PERINEURAL AS NEEDED
Status: DISCONTINUED | OUTPATIENT
Start: 2017-11-07 | End: 2017-11-07 | Stop reason: HOSPADM

## 2017-11-07 RX ORDER — FUROSEMIDE 40 MG/1
40 TABLET ORAL DAILY
Status: DISCONTINUED | OUTPATIENT
Start: 2017-11-08 | End: 2017-11-10 | Stop reason: HOSPADM

## 2017-11-07 RX ORDER — SODIUM CHLORIDE 9 MG/ML
25 INJECTION, SOLUTION INTRAVENOUS CONTINUOUS
Status: DISCONTINUED | OUTPATIENT
Start: 2017-11-07 | End: 2017-11-07 | Stop reason: HOSPADM

## 2017-11-07 RX ORDER — SODIUM CHLORIDE 0.9 % (FLUSH) 0.9 %
5-10 SYRINGE (ML) INJECTION EVERY 8 HOURS
Status: DISCONTINUED | OUTPATIENT
Start: 2017-11-07 | End: 2017-11-09 | Stop reason: SDUPTHER

## 2017-11-07 RX ORDER — CEFAZOLIN SODIUM IN 0.9 % NACL 2 G/50 ML
2 INTRAVENOUS SOLUTION, PIGGYBACK (ML) INTRAVENOUS EVERY 8 HOURS
Status: DISCONTINUED | OUTPATIENT
Start: 2017-11-07 | End: 2017-11-07

## 2017-11-07 RX ORDER — SODIUM CHLORIDE, SODIUM LACTATE, POTASSIUM CHLORIDE, CALCIUM CHLORIDE 600; 310; 30; 20 MG/100ML; MG/100ML; MG/100ML; MG/100ML
100 INJECTION, SOLUTION INTRAVENOUS CONTINUOUS
Status: DISCONTINUED | OUTPATIENT
Start: 2017-11-07 | End: 2017-11-07 | Stop reason: HOSPADM

## 2017-11-07 RX ORDER — TOPIRAMATE 25 MG/1
25 TABLET ORAL
Status: DISCONTINUED | OUTPATIENT
Start: 2017-11-08 | End: 2017-11-10 | Stop reason: HOSPADM

## 2017-11-07 RX ORDER — HYDROMORPHONE HYDROCHLORIDE 1 MG/ML
INJECTION, SOLUTION INTRAMUSCULAR; INTRAVENOUS; SUBCUTANEOUS AS NEEDED
Status: DISCONTINUED | OUTPATIENT
Start: 2017-11-07 | End: 2017-11-07 | Stop reason: HOSPADM

## 2017-11-07 RX ORDER — MIDAZOLAM HYDROCHLORIDE 1 MG/ML
INJECTION, SOLUTION INTRAMUSCULAR; INTRAVENOUS AS NEEDED
Status: DISCONTINUED | OUTPATIENT
Start: 2017-11-07 | End: 2017-11-07 | Stop reason: HOSPADM

## 2017-11-07 RX ORDER — SODIUM CHLORIDE 9 MG/ML
1000 INJECTION, SOLUTION INTRAVENOUS CONTINUOUS
Status: DISCONTINUED | OUTPATIENT
Start: 2017-11-07 | End: 2017-11-07 | Stop reason: HOSPADM

## 2017-11-07 RX ORDER — SODIUM CHLORIDE 0.9 % (FLUSH) 0.9 %
5-10 SYRINGE (ML) INJECTION EVERY 8 HOURS
Status: DISCONTINUED | OUTPATIENT
Start: 2017-11-07 | End: 2017-11-07 | Stop reason: HOSPADM

## 2017-11-07 RX ORDER — NEOSTIGMINE METHYLSULFATE 1 MG/ML
INJECTION INTRAVENOUS AS NEEDED
Status: DISCONTINUED | OUTPATIENT
Start: 2017-11-07 | End: 2017-11-07 | Stop reason: HOSPADM

## 2017-11-07 RX ORDER — SODIUM CHLORIDE 0.9 % (FLUSH) 0.9 %
5-10 SYRINGE (ML) INJECTION EVERY 8 HOURS
Status: DISCONTINUED | OUTPATIENT
Start: 2017-11-08 | End: 2017-11-10 | Stop reason: HOSPADM

## 2017-11-07 RX ORDER — SODIUM CHLORIDE 0.9 % (FLUSH) 0.9 %
5-10 SYRINGE (ML) INJECTION AS NEEDED
Status: DISCONTINUED | OUTPATIENT
Start: 2017-11-07 | End: 2017-11-07 | Stop reason: HOSPADM

## 2017-11-07 RX ORDER — SUCCINYLCHOLINE CHLORIDE 20 MG/ML
INJECTION INTRAMUSCULAR; INTRAVENOUS AS NEEDED
Status: DISCONTINUED | OUTPATIENT
Start: 2017-11-07 | End: 2017-11-07 | Stop reason: HOSPADM

## 2017-11-07 RX ORDER — PRAVASTATIN SODIUM 40 MG/1
40 TABLET ORAL DAILY
Status: DISCONTINUED | OUTPATIENT
Start: 2017-11-08 | End: 2017-11-10 | Stop reason: HOSPADM

## 2017-11-07 RX ORDER — MIDAZOLAM HYDROCHLORIDE 1 MG/ML
0.5 INJECTION, SOLUTION INTRAMUSCULAR; INTRAVENOUS
Status: DISCONTINUED | OUTPATIENT
Start: 2017-11-07 | End: 2017-11-07 | Stop reason: HOSPADM

## 2017-11-07 RX ORDER — GLYCOPYRROLATE 0.2 MG/ML
INJECTION INTRAMUSCULAR; INTRAVENOUS AS NEEDED
Status: DISCONTINUED | OUTPATIENT
Start: 2017-11-07 | End: 2017-11-07 | Stop reason: HOSPADM

## 2017-11-07 RX ORDER — MORPHINE SULFATE 10 MG/ML
2 INJECTION, SOLUTION INTRAMUSCULAR; INTRAVENOUS
Status: DISCONTINUED | OUTPATIENT
Start: 2017-11-07 | End: 2017-11-07 | Stop reason: HOSPADM

## 2017-11-07 RX ORDER — OXYCODONE HYDROCHLORIDE 5 MG/1
5 TABLET ORAL
Status: DISCONTINUED | OUTPATIENT
Start: 2017-11-07 | End: 2017-11-08

## 2017-11-07 RX ORDER — ATENOLOL 25 MG/1
25 TABLET ORAL DAILY
Status: DISCONTINUED | OUTPATIENT
Start: 2017-11-08 | End: 2017-11-10 | Stop reason: HOSPADM

## 2017-11-07 RX ORDER — TRAZODONE HYDROCHLORIDE 100 MG/1
100 TABLET ORAL 2 TIMES DAILY
Status: DISCONTINUED | OUTPATIENT
Start: 2017-11-07 | End: 2017-11-10 | Stop reason: HOSPADM

## 2017-11-07 RX ORDER — OXYCODONE HYDROCHLORIDE 5 MG/1
10 TABLET ORAL
Status: DISCONTINUED | OUTPATIENT
Start: 2017-11-07 | End: 2017-11-08

## 2017-11-07 RX ORDER — NALOXONE HYDROCHLORIDE 0.4 MG/ML
0.4 INJECTION, SOLUTION INTRAMUSCULAR; INTRAVENOUS; SUBCUTANEOUS AS NEEDED
Status: DISCONTINUED | OUTPATIENT
Start: 2017-11-07 | End: 2017-11-10 | Stop reason: HOSPADM

## 2017-11-07 RX ORDER — HYDROMORPHONE HCL IN 0.9% NACL 15 MG/30ML
PATIENT CONTROLLED ANALGESIA VIAL INTRAVENOUS
Status: DISCONTINUED | OUTPATIENT
Start: 2017-11-07 | End: 2017-11-08

## 2017-11-07 RX ORDER — MIDAZOLAM HYDROCHLORIDE 1 MG/ML
0.5 INJECTION, SOLUTION INTRAMUSCULAR; INTRAVENOUS
Status: COMPLETED | OUTPATIENT
Start: 2017-11-07 | End: 2017-11-07

## 2017-11-07 RX ORDER — SODIUM CHLORIDE, SODIUM LACTATE, POTASSIUM CHLORIDE, CALCIUM CHLORIDE 600; 310; 30; 20 MG/100ML; MG/100ML; MG/100ML; MG/100ML
INJECTION, SOLUTION INTRAVENOUS
Status: DISCONTINUED | OUTPATIENT
Start: 2017-11-07 | End: 2017-11-07 | Stop reason: HOSPADM

## 2017-11-07 RX ORDER — SODIUM CHLORIDE, SODIUM LACTATE, POTASSIUM CHLORIDE, CALCIUM CHLORIDE 600; 310; 30; 20 MG/100ML; MG/100ML; MG/100ML; MG/100ML
25 INJECTION, SOLUTION INTRAVENOUS CONTINUOUS
Status: DISCONTINUED | OUTPATIENT
Start: 2017-11-07 | End: 2017-11-07 | Stop reason: HOSPADM

## 2017-11-07 RX ORDER — ONDANSETRON 2 MG/ML
4 INJECTION INTRAMUSCULAR; INTRAVENOUS
Status: DISCONTINUED | OUTPATIENT
Start: 2017-11-07 | End: 2017-11-10 | Stop reason: HOSPADM

## 2017-11-07 RX ORDER — DIPHENHYDRAMINE HYDROCHLORIDE 50 MG/ML
12.5 INJECTION, SOLUTION INTRAMUSCULAR; INTRAVENOUS
Status: DISCONTINUED | OUTPATIENT
Start: 2017-11-07 | End: 2017-11-10 | Stop reason: HOSPADM

## 2017-11-07 RX ORDER — DIPHENHYDRAMINE HYDROCHLORIDE 50 MG/ML
12.5 INJECTION, SOLUTION INTRAMUSCULAR; INTRAVENOUS AS NEEDED
Status: DISCONTINUED | OUTPATIENT
Start: 2017-11-07 | End: 2017-11-07 | Stop reason: HOSPADM

## 2017-11-07 RX ORDER — SODIUM CHLORIDE 9 MG/ML
125 INJECTION, SOLUTION INTRAVENOUS CONTINUOUS
Status: DISPENSED | OUTPATIENT
Start: 2017-11-07 | End: 2017-11-08

## 2017-11-07 RX ORDER — LIDOCAINE HYDROCHLORIDE 20 MG/ML
INJECTION, SOLUTION EPIDURAL; INFILTRATION; INTRACAUDAL; PERINEURAL AS NEEDED
Status: DISCONTINUED | OUTPATIENT
Start: 2017-11-07 | End: 2017-11-07 | Stop reason: HOSPADM

## 2017-11-07 RX ORDER — SODIUM CHLORIDE 0.9 % (FLUSH) 0.9 %
5-10 SYRINGE (ML) INJECTION AS NEEDED
Status: DISCONTINUED | OUTPATIENT
Start: 2017-11-07 | End: 2017-11-10 | Stop reason: HOSPADM

## 2017-11-07 RX ORDER — PROPOFOL 10 MG/ML
INJECTION, EMULSION INTRAVENOUS AS NEEDED
Status: DISCONTINUED | OUTPATIENT
Start: 2017-11-07 | End: 2017-11-07 | Stop reason: HOSPADM

## 2017-11-07 RX ORDER — AMOXICILLIN 250 MG
1 CAPSULE ORAL 2 TIMES DAILY
Status: DISCONTINUED | OUTPATIENT
Start: 2017-11-08 | End: 2017-11-10 | Stop reason: HOSPADM

## 2017-11-07 RX ORDER — HYDROMORPHONE HYDROCHLORIDE 1 MG/ML
0.2 INJECTION, SOLUTION INTRAMUSCULAR; INTRAVENOUS; SUBCUTANEOUS
Status: DISCONTINUED | OUTPATIENT
Start: 2017-11-07 | End: 2017-11-07 | Stop reason: HOSPADM

## 2017-11-07 RX ORDER — ROCURONIUM BROMIDE 10 MG/ML
INJECTION, SOLUTION INTRAVENOUS AS NEEDED
Status: DISCONTINUED | OUTPATIENT
Start: 2017-11-07 | End: 2017-11-07 | Stop reason: HOSPADM

## 2017-11-07 RX ORDER — ONDANSETRON 2 MG/ML
INJECTION INTRAMUSCULAR; INTRAVENOUS AS NEEDED
Status: DISCONTINUED | OUTPATIENT
Start: 2017-11-07 | End: 2017-11-07 | Stop reason: HOSPADM

## 2017-11-07 RX ORDER — MONTELUKAST SODIUM 10 MG/1
10 TABLET ORAL
Status: DISCONTINUED | OUTPATIENT
Start: 2017-11-07 | End: 2017-11-10 | Stop reason: HOSPADM

## 2017-11-07 RX ORDER — KETAMINE HYDROCHLORIDE 10 MG/ML
INJECTION, SOLUTION INTRAMUSCULAR; INTRAVENOUS AS NEEDED
Status: DISCONTINUED | OUTPATIENT
Start: 2017-11-07 | End: 2017-11-07 | Stop reason: HOSPADM

## 2017-11-07 RX ORDER — VANCOMYCIN 2 GRAM/500 ML IN 0.9 % SODIUM CHLORIDE INTRAVENOUS
AS NEEDED
Status: DISCONTINUED | OUTPATIENT
Start: 2017-11-07 | End: 2017-11-07 | Stop reason: HOSPADM

## 2017-11-07 RX ORDER — FENTANYL CITRATE 50 UG/ML
25 INJECTION, SOLUTION INTRAMUSCULAR; INTRAVENOUS
Status: COMPLETED | OUTPATIENT
Start: 2017-11-07 | End: 2017-11-07

## 2017-11-07 RX ORDER — FENTANYL CITRATE 50 UG/ML
25 INJECTION, SOLUTION INTRAMUSCULAR; INTRAVENOUS
Status: DISCONTINUED | OUTPATIENT
Start: 2017-11-07 | End: 2017-11-07 | Stop reason: HOSPADM

## 2017-11-07 RX ORDER — POLYETHYLENE GLYCOL 3350 17 G/17G
17 POWDER, FOR SOLUTION ORAL DAILY
Status: DISCONTINUED | OUTPATIENT
Start: 2017-11-08 | End: 2017-11-08

## 2017-11-07 RX ORDER — FACIAL-BODY WIPES
10 EACH TOPICAL DAILY PRN
Status: DISCONTINUED | OUTPATIENT
Start: 2017-11-09 | End: 2017-11-10 | Stop reason: HOSPADM

## 2017-11-07 RX ORDER — HYDROMORPHONE HYDROCHLORIDE 1 MG/ML
0.5 INJECTION, SOLUTION INTRAMUSCULAR; INTRAVENOUS; SUBCUTANEOUS
Status: DISCONTINUED | OUTPATIENT
Start: 2017-11-07 | End: 2017-11-07 | Stop reason: HOSPADM

## 2017-11-07 RX ADMIN — MIDAZOLAM HYDROCHLORIDE 0.5 MG: 1 INJECTION, SOLUTION INTRAMUSCULAR; INTRAVENOUS at 20:45

## 2017-11-07 RX ADMIN — FENTANYL CITRATE 50 MCG: 50 INJECTION, SOLUTION INTRAMUSCULAR; INTRAVENOUS at 18:30

## 2017-11-07 RX ADMIN — Medication 10 ML: at 23:26

## 2017-11-07 RX ADMIN — MONTELUKAST SODIUM 10 MG: 10 TABLET, FILM COATED ORAL at 23:26

## 2017-11-07 RX ADMIN — FENTANYL CITRATE 25 MCG: 50 INJECTION, SOLUTION INTRAMUSCULAR; INTRAVENOUS at 20:59

## 2017-11-07 RX ADMIN — PROPOFOL 30 MG: 10 INJECTION, EMULSION INTRAVENOUS at 19:56

## 2017-11-07 RX ADMIN — SODIUM CHLORIDE, SODIUM LACTATE, POTASSIUM CHLORIDE, CALCIUM CHLORIDE: 600; 310; 30; 20 INJECTION, SOLUTION INTRAVENOUS at 18:55

## 2017-11-07 RX ADMIN — ROCURONIUM BROMIDE 5 MG: 10 INJECTION, SOLUTION INTRAVENOUS at 18:07

## 2017-11-07 RX ADMIN — FENTANYL CITRATE 150 MCG: 50 INJECTION, SOLUTION INTRAMUSCULAR; INTRAVENOUS at 18:07

## 2017-11-07 RX ADMIN — HYDROMORPHONE HYDROCHLORIDE 0.6 MG: 1 INJECTION, SOLUTION INTRAMUSCULAR; INTRAVENOUS; SUBCUTANEOUS at 20:44

## 2017-11-07 RX ADMIN — ROCURONIUM BROMIDE 45 MG: 10 INJECTION, SOLUTION INTRAVENOUS at 18:12

## 2017-11-07 RX ADMIN — TRAZODONE HYDROCHLORIDE 100 MG: 100 TABLET ORAL at 23:26

## 2017-11-07 RX ADMIN — FENTANYL CITRATE 25 MCG: 50 INJECTION, SOLUTION INTRAMUSCULAR; INTRAVENOUS at 21:08

## 2017-11-07 RX ADMIN — ONDANSETRON 4 MG: 2 INJECTION INTRAMUSCULAR; INTRAVENOUS at 23:26

## 2017-11-07 RX ADMIN — HYDROMORPHONE HYDROCHLORIDE 0.4 MG: 1 INJECTION, SOLUTION INTRAMUSCULAR; INTRAVENOUS; SUBCUTANEOUS at 20:39

## 2017-11-07 RX ADMIN — LIDOCAINE HYDROCHLORIDE 100 MG: 20 INJECTION, SOLUTION EPIDURAL; INFILTRATION; INTRACAUDAL; PERINEURAL at 18:07

## 2017-11-07 RX ADMIN — HYDROMORPHONE HYDROCHLORIDE 0.2 MG: 1 INJECTION, SOLUTION INTRAMUSCULAR; INTRAVENOUS; SUBCUTANEOUS at 20:40

## 2017-11-07 RX ADMIN — HYDROMORPHONE HYDROCHLORIDE 0.2 MG: 1 INJECTION, SOLUTION INTRAMUSCULAR; INTRAVENOUS; SUBCUTANEOUS at 21:20

## 2017-11-07 RX ADMIN — HYDROMORPHONE HYDROCHLORIDE 0.2 MG: 1 INJECTION, SOLUTION INTRAMUSCULAR; INTRAVENOUS; SUBCUTANEOUS at 21:32

## 2017-11-07 RX ADMIN — MIDAZOLAM HYDROCHLORIDE 0.5 MG: 1 INJECTION, SOLUTION INTRAMUSCULAR; INTRAVENOUS at 21:00

## 2017-11-07 RX ADMIN — PROPOFOL 20 MG: 10 INJECTION, EMULSION INTRAVENOUS at 20:00

## 2017-11-07 RX ADMIN — VANCOMYCIN 2 GRAM/500 ML IN 0.9 % SODIUM CHLORIDE INTRAVENOUS 2 G: at 18:23

## 2017-11-07 RX ADMIN — SODIUM CHLORIDE 125 ML/HR: 900 INJECTION, SOLUTION INTRAVENOUS at 22:22

## 2017-11-07 RX ADMIN — SODIUM CHLORIDE, SODIUM LACTATE, POTASSIUM CHLORIDE, CALCIUM CHLORIDE: 600; 310; 30; 20 INJECTION, SOLUTION INTRAVENOUS at 17:59

## 2017-11-07 RX ADMIN — GLYCOPYRROLATE 0.5 MG: 0.2 INJECTION INTRAMUSCULAR; INTRAVENOUS at 19:51

## 2017-11-07 RX ADMIN — ONDANSETRON 4 MG: 2 INJECTION INTRAMUSCULAR; INTRAVENOUS at 19:44

## 2017-11-07 RX ADMIN — HYDROMORPHONE HYDROCHLORIDE 0.2 MG: 1 INJECTION, SOLUTION INTRAMUSCULAR; INTRAVENOUS; SUBCUTANEOUS at 21:10

## 2017-11-07 RX ADMIN — VANCOMYCIN HYDROCHLORIDE 2000 MG: 10 INJECTION, POWDER, LYOPHILIZED, FOR SOLUTION INTRAVENOUS at 17:39

## 2017-11-07 RX ADMIN — NEOSTIGMINE METHYLSULFATE 3 MG: 1 INJECTION INTRAVENOUS at 19:51

## 2017-11-07 RX ADMIN — KETAMINE HYDROCHLORIDE 25 MG: 10 INJECTION, SOLUTION INTRAMUSCULAR; INTRAVENOUS at 18:30

## 2017-11-07 RX ADMIN — Medication: at 20:56

## 2017-11-07 RX ADMIN — MIDAZOLAM HYDROCHLORIDE 2 MG: 1 INJECTION, SOLUTION INTRAMUSCULAR; INTRAVENOUS at 17:59

## 2017-11-07 RX ADMIN — SUCCINYLCHOLINE CHLORIDE 160 MG: 20 INJECTION INTRAMUSCULAR; INTRAVENOUS at 18:07

## 2017-11-07 RX ADMIN — PROPOFOL 250 MG: 10 INJECTION, EMULSION INTRAVENOUS at 18:07

## 2017-11-07 RX ADMIN — MIDAZOLAM HYDROCHLORIDE 0.5 MG: 1 INJECTION, SOLUTION INTRAMUSCULAR; INTRAVENOUS at 21:07

## 2017-11-07 RX ADMIN — HYDROMORPHONE HYDROCHLORIDE 0.4 MG: 1 INJECTION, SOLUTION INTRAMUSCULAR; INTRAVENOUS; SUBCUTANEOUS at 19:53

## 2017-11-07 RX ADMIN — PROPOFOL 50 MG: 10 INJECTION, EMULSION INTRAVENOUS at 20:14

## 2017-11-07 RX ADMIN — ACETAMINOPHEN 650 MG: 325 TABLET ORAL at 23:26

## 2017-11-07 RX ADMIN — FENTANYL CITRATE 25 MCG: 50 INJECTION, SOLUTION INTRAMUSCULAR; INTRAVENOUS at 20:50

## 2017-11-07 RX ADMIN — FENTANYL CITRATE 50 MCG: 50 INJECTION, SOLUTION INTRAMUSCULAR; INTRAVENOUS at 19:05

## 2017-11-07 RX ADMIN — HYDROMORPHONE HYDROCHLORIDE 0.4 MG: 1 INJECTION, SOLUTION INTRAMUSCULAR; INTRAVENOUS; SUBCUTANEOUS at 19:57

## 2017-11-07 RX ADMIN — PROPOFOL 20 MG: 10 INJECTION, EMULSION INTRAVENOUS at 20:11

## 2017-11-07 RX ADMIN — MIDAZOLAM HYDROCHLORIDE 0.5 MG: 1 INJECTION, SOLUTION INTRAMUSCULAR; INTRAVENOUS at 21:15

## 2017-11-07 RX ADMIN — FENTANYL CITRATE 25 MCG: 50 INJECTION, SOLUTION INTRAMUSCULAR; INTRAVENOUS at 20:45

## 2017-11-07 RX ADMIN — HYDROMORPHONE HYDROCHLORIDE 0.2 MG: 1 INJECTION, SOLUTION INTRAMUSCULAR; INTRAVENOUS; SUBCUTANEOUS at 20:55

## 2017-11-07 RX ADMIN — PROPOFOL 30 MG: 10 INJECTION, EMULSION INTRAVENOUS at 20:05

## 2017-11-07 RX ADMIN — HYDROMORPHONE HYDROCHLORIDE 0.2 MG: 1 INJECTION, SOLUTION INTRAMUSCULAR; INTRAVENOUS; SUBCUTANEOUS at 20:01

## 2017-11-07 RX ADMIN — SODIUM CHLORIDE, SODIUM LACTATE, POTASSIUM CHLORIDE, AND CALCIUM CHLORIDE 100 ML/HR: 600; 310; 30; 20 INJECTION, SOLUTION INTRAVENOUS at 16:26

## 2017-11-07 NOTE — IP AVS SNAPSHOT
[address] 
[phone] Patient: Maryann To MRN: [id number] :1975 My Medications TAKE these medications as instructed Instructions Each Dose to Equal  
 Morning Noon Evening Bedtime  
 atenolol 25 mg tablet Commonly known as:  TENORMIN Your last dose was: Your next dose is:    
   
   
 take 1 tablet by mouth once daily  
     
   
   
   
  
 furosemide 40 mg tablet Commonly known as:  LASIX Your last dose was: Your next dose is:    
   
   
 take 1 tablet by mouth once daily  
     
   
   
   
  
 gabapentin 400 mg capsule Commonly known as:  NEURONTIN Your last dose was: Your next dose is: Take 1 Cap by mouth two (2) times a day. 400 mg  
    
   
   
   
  
 linaclotide 145 mcg Cap capsule Commonly known as:  Kelechi Zuly Your last dose was: Your next dose is: Take 1 Cap by mouth Daily (before breakfast). 145 mcg  
    
   
   
   
  
 methocarbamol 750 mg tablet Commonly known as:  ROBAXIN Your last dose was: Your next dose is: Take 1 Tab by mouth three (3) times daily. 750 mg  
    
   
   
   
  
 montelukast 10 mg tablet Commonly known as:  SINGULAIR Your last dose was: Your next dose is:    
   
   
 take 1 tablet by mouth once daily  
     
   
   
   
  
 oxyCODONE IR 10 mg Tab immediate release tablet Commonly known as:  Laith Valentino Your last dose was: Your next dose is: Take 1 Tab by mouth every four (4) hours as needed. Max Daily Amount: 60 mg.  
 10 mg  
    
   
   
   
  
 potassium chloride 20 mEq tablet Commonly known as:  K-DUR, KLOR-CON Your last dose was: Your next dose is:    
   
   
 take 1 tablet by mouth once daily  
     
   
   
   
  
 pravastatin 40 mg tablet Commonly known as:  PRAVACHOL  
   
 Your last dose was: Your next dose is: TAKE 1 TABLET BY MOUTH IN THE EVENING  
     
   
   
   
  
 topiramate 25 mg tablet Commonly known as:  TOPAMAX Your last dose was: Your next dose is: TAKE 1 TABLET TWICE A DAY WITH MEALS  
     
   
   
   
  
 traZODone 100 mg tablet Commonly known as:  Saint Rumps Your last dose was: Your next dose is:    
   
   
 take 1 tablet by mouth at bedtime ASK your physician about these medications Instructions Each Dose to Equal  
 Morning Noon Evening Bedtime BD LUER-MARIELY SYRINGE 3 mL 25 gauge x 1\" Syrg Generic drug:  Syringe with Needle (Disp) Your last dose was: Your next dose is:    
   
   
 USE TO INJECT 1 ML EVERY 2 WEEKS Cholecalciferol (Vitamin D3) 2,000 unit Cap capsule Commonly known as:  VITAMIN D3 Your last dose was: Your next dose is: Take 2,000 Units by mouth daily. 2000 Units  
    
   
   
   
  
 cyclobenzaprine 10 mg tablet Commonly known as:  FLEXERIL Your last dose was: Your next dose is:    
   
   
 take 1 tablet by mouth three times a day if needed for muscle spasm  
     
   
   
   
  
 diclofenac EC 75 mg EC tablet Commonly known as:  VOLTAREN Your last dose was: Your next dose is:    
   
   
 take 1 tablet by mouth twice a day FISH OIL PO Your last dose was: Your next dose is: Take  by mouth daily. glimepiride 2 mg tablet Commonly known as:  AMARYL Your last dose was: Your next dose is: TAKE 1 TAB BY MOUTH TWO (2) TIMES A DAY. glucose blood VI test strips strip Commonly known as:  Ascensia CONTOUR Your last dose was: Your next dose is:    
   
   
 E11.69 Test blood sugar 3 times a day HAIR,SKIN AND NAILS PO Your last dose was: Your next dose is: Take  by mouth daily. insulin glargine 100 unit/mL (3 mL) Inpn Commonly known as:  LANTUS SOLOSTAR Your last dose was: Your next dose is:    
   
   
 30 Units by SubCUTAneous route nightly. INJECT 30 UNITS DAILY  
 30 Units Lancets Misc Your last dose was: Your next dose is:    
   
   
 E11.69  
     
   
   
   
  
 loratadine 10 mg tablet Commonly known as:  Kingman Hippo Your last dose was: Your next dose is: Take 1 Tab by mouth nightly. 10 mg  
    
   
   
   
  
 metFORMIN  mg tablet Commonly known as:  GLUCOPHAGE XR Your last dose was: Your next dose is:    
   
   
 take 1 tablet by mouth once daily MULTIVITAMIN PO Your last dose was: Your next dose is: Take  by mouth daily. NYSTOP powder Generic drug:  nystatin Your last dose was: Your next dose is:    
   
   
 two (2) times a day. phentermine 37.5 mg tablet Commonly known as:  ADIPEX-P Your last dose was: Your next dose is: Take 1 Tab by mouth every morning. Max Daily Amount: 37.5 mg.  
 37.5 mg  
    
   
   
   
  
 polyethylene glycol 17 gram packet Commonly known as:  Leata Sans Your last dose was: Your next dose is: Take 1 Packet by mouth daily. 17 g  
    
   
   
   
  
 traMADol 50 mg tablet Commonly known as:  ULTRAM  
   
Your last dose was: Your next dose is: Take 50 mg by mouth every six (6) hours as needed for Pain. 50 mg  
    
   
   
   
  
 VITAMIN E PO Your last dose was: Your next dose is: Take  by mouth daily. Where to Get Your Medications Information on where to get these meds will be given to you by the nurse or doctor. ! Ask your nurse or doctor about these medications  
  gabapentin 400 mg capsule  
 methocarbamol 750 mg tablet  
 oxyCODONE IR 10 mg Tab immediate release tablet

## 2017-11-07 NOTE — ANESTHESIA PREPROCEDURE EVALUATION
Anesthetic History   No history of anesthetic complications            Review of Systems / Medical History  Patient summary reviewed, nursing notes reviewed and pertinent labs reviewed    Pulmonary  Within defined limits      Sleep apnea    Asthma        Neuro/Psych   Within defined limits           Cardiovascular  Within defined limits  Hypertension              Exercise tolerance: >4 METS     GI/Hepatic/Renal  Within defined limits   GERD           Endo/Other  Within defined limits  Diabetes    Morbid obesity and arthritis     Other Findings              Physical Exam    Airway  Mallampati: III  TM Distance: 4 - 6 cm  Neck ROM: decreased range of motion   Mouth opening: Normal     Cardiovascular  Regular rate and rhythm,  S1 and S2 normal,  no murmur, click, rub, or gallop             Dental  No notable dental hx       Pulmonary  Breath sounds clear to auscultation               Abdominal  GI exam deferred       Other Findings            Anesthetic Plan    ASA: 2  Anesthesia type: general          Induction: Intravenous  Anesthetic plan and risks discussed with: Patient

## 2017-11-07 NOTE — IP AVS SNAPSHOT
[address] 
[phone] Patient: Maegan Miner MRN: [id number] :1975 About your hospitalization You were admitted on:  2017 You last received care in the:  [address] [place] [address] You were discharged on:  November 10, 2017 Why you were hospitalized Your primary diagnosis was:  Not on File Your diagnoses also included:  Ddd (Degenerative Disc Disease), Lumbar Things You Need To Do (next 8 weeks) Call [address] in 1 day(s)  PT/OT Phone:  [phone] Where:  [place], [address] [facility] [address] Follow up with Rubén Gleason MD  
  
Phone:  [phone] Where:  [address] [place] [address] Discharge Orders None A check enzo indicates which time of day the medication should be taken. My Medications TAKE these medications as instructed Instructions Each Dose to Equal  
 Morning Noon Evening Bedtime  
 atenolol 25 mg tablet Commonly known as:  TENORMIN Your last dose was: Your next dose is:    
   
   
 take 1 tablet by mouth once daily  
     
   
   
   
  
 furosemide 40 mg tablet Commonly known as:  LASIX Your last dose was: Your next dose is:    
   
   
 take 1 tablet by mouth once daily  
     
   
   
   
  
 gabapentin 400 mg capsule Commonly known as:  NEURONTIN Your last dose was: Your next dose is: Take 1 Cap by mouth two (2) times a day. 400 mg  
    
   
   
   
  
 linaclotide 145 mcg Cap capsule Commonly known as:  Looney Coon Your last dose was: Your next dose is: Take 1 Cap by mouth Daily (before breakfast). 145 mcg  
    
   
   
   
  
 methocarbamol 750 mg tablet Commonly known as:  ROBAXIN Your last dose was: Your next dose is: Take 1 Tab by mouth three (3) times daily. 750 mg  
    
   
   
   
  
 montelukast 10 mg tablet Commonly known as:  SINGULAIR Your last dose was: Your next dose is:    
   
   
 take 1 tablet by mouth once daily  
     
   
   
   
  
 oxyCODONE IR 10 mg Tab immediate release tablet Commonly known as:  Kimberly Canal Your last dose was: Your next dose is: Take 1 Tab by mouth every four (4) hours as needed. Max Daily Amount: 60 mg.  
 10 mg  
    
   
   
   
  
 potassium chloride 20 mEq tablet Commonly known as:  K-DUR, KLOR-CON Your last dose was: Your next dose is:    
   
   
 take 1 tablet by mouth once daily  
     
   
   
   
  
 pravastatin 40 mg tablet Commonly known as:  PRAVACHOL Your last dose was: Your next dose is: TAKE 1 TABLET BY MOUTH IN THE EVENING  
     
   
   
   
  
 topiramate 25 mg tablet Commonly known as:  TOPAMAX Your last dose was: Your next dose is: TAKE 1 TABLET TWICE A DAY WITH MEALS  
     
   
   
   
  
 traZODone 100 mg tablet Commonly known as:  [place] Your last dose was: Your next dose is:    
   
   
 take 1 tablet by mouth at bedtime ASK your physician about these medications Instructions Each Dose to Equal  
 Morning Noon Evening Bedtime BD LUER-MARIELY SYRINGE 3 mL 25 gauge x 1\" Syrg Generic drug:  Syringe with Needle (Disp) Your last dose was: Your next dose is:    
   
   
 USE TO INJECT 1 ML EVERY 2 WEEKS Cholecalciferol (Vitamin D3) 2,000 unit Cap capsule Commonly known as:  VITAMIN D3 Your last dose was: Your next dose is: Take 2,000 Units by mouth daily. 2000 Units  
    
   
   
   
  
 cyclobenzaprine 10 mg tablet Commonly known as:  FLEXERIL Your last dose was: Your next dose is: take 1 tablet by mouth three times a day if needed for muscle spasm  
     
   
   
   
  
 diclofenac EC 75 mg EC tablet Commonly known as:  VOLTAREN Your last dose was: Your next dose is:    
   
   
 take 1 tablet by mouth twice a day FISH OIL PO Your last dose was: Your next dose is: Take  by mouth daily. glimepiride 2 mg tablet Commonly known as:  AMARYL Your last dose was: Your next dose is: TAKE 1 TAB BY MOUTH TWO (2) TIMES A DAY. glucose blood VI test strips strip Commonly known as:  Ascensia CONTOUR Your last dose was: Your next dose is:    
   
   
 E11.69 Test blood sugar 3 times a day HAIR,SKIN AND NAILS PO Your last dose was: Your next dose is: Take  by mouth daily. insulin glargine 100 unit/mL (3 mL) Inpn Commonly known as:  LANTUS SOLOSTAR Your last dose was: Your next dose is:    
   
   
 30 Units by SubCUTAneous route nightly. INJECT 30 UNITS DAILY  
 30 Units Lancets Misc Your last dose was: Your next dose is:    
   
   
 E11.69  
     
   
   
   
  
 loratadine 10 mg tablet Commonly known as:  Sherry Harrison Your last dose was: Your next dose is: Take 1 Tab by mouth nightly. 10 mg  
    
   
   
   
  
 metFORMIN  mg tablet Commonly known as:  GLUCOPHAGE XR Your last dose was: Your next dose is:    
   
   
 take 1 tablet by mouth once daily MULTIVITAMIN PO Your last dose was: Your next dose is: Take  by mouth daily. NYSTOP powder Generic drug:  nystatin Your last dose was: Your next dose is:    
   
   
 two (2) times a day. phentermine 37.5 mg tablet Commonly known as:  ADIPEX-P Your last dose was: Your next dose is: Take 1 Tab by mouth every morning. Max Daily Amount: 37.5 mg.  
 37.5 mg  
    
   
   
   
  
 polyethylene glycol 17 gram packet Commonly known as:  [place] Salt Your last dose was: Your next dose is: Take 1 Packet by mouth daily. 17 g  
    
   
   
   
  
 traMADol 50 mg tablet Commonly known as:  ULTRAM  
   
Your last dose was: Your next dose is: Take 50 mg by mouth every six (6) hours as needed for Pain. 50 mg  
    
   
   
   
  
 VITAMIN E PO Your last dose was: Your next dose is: Take  by mouth daily. Where to Get Your Medications Information on where to get these meds will be given to you by the nurse or doctor. ! Ask your nurse or doctor about these medications  
  gabapentin 400 mg capsule  
 methocarbamol 750 mg tablet  
 oxyCODONE IR 10 mg Tab immediate release tablet Discharge Instructions After Hospital Care Plan:  Discharge Instructions Lumbar Fusion Surgery GUILLERMINA Amos PA-C Patient Name Philly Patel Date of procedure: 11/7/11 Date of discharge: 11/10/11 Procedure (Procedure(s): LUMBAR LAMINECTOMY, POSTERIOR SPINAL FUSION L5-S1) Surgeon:  Kellie Luther MD    
 
PCP: 
 
Follow up appointments 
-follow up with Dr. Sunil Knowles in 2 weeks. Call [phone], ext [phone] to make an appointment as soon as you get home from the hospital. 
 
[address]: _________________________   phone: _______________________ The agency will contact you to arrange dates/times for visits. Please call them if you do not hear from them within 24 hours after you are discharged When to call your Orthopaedic Surgeon: 
-Signs of infection-if your incision is red; continues to have drainage; drainage has a foul odor or if you have a persistent fever over 101 degrees for 24 hours 
-nausea or vomiting, severe headache 
-loss of bowel or bladder function, inability to urinate 
-changes in sensation in your arms or legs (numbness, tingling, loss of color) 
-increased weakness-greater than before your surgery 
-severe pain or pain not relieved by medications 
-Signs of a blood clot in your leg-calf pain, tenderness, redness, swelling of lower leg When to call your Primary Care Physician: 
-Concerns about medical conditions such as diabetes, high blood pressure, asthma, congestive heart failure 
-Call if blood sugars are elevated, persistent headache or dizziness, coughing or congestion, constipation or diarrhea, burning with urination, abnormal heart rate When to call 911and go to the nearest emergency room 
-acute onset of chest pain, shortness of breath, difficulty breathing Activity - Your only exercise should be walking. Start with short frequent walks and increase your walking distance each day. 
-Limit the amount of time you sit to 20-30 minute intervals. Sitting for prolonged periods of time will be uncomfortable for you following surgery. 
-Do NOT lift anything over 5 pounds 
-Do NOT do any straining, twisting or bending 
-When you are in bed, you may lay on your back or on either side. Do NOT lie on your stomach Brace  if ordered by surgeon  
-If you have a back brace, you should wear your brace at all times when you are out of bed. Do not wear the brace while in bed or showering. 
-Remember to always wear a cotton t-shirt underneath your brace. 
-Do not bend or twist when your brace is off Driving 
-You may not drive or return to work until instructed by your physician. However, you may ride in the car for short periods of time. Incision Care Your incision has been closed with absorbable sutures and the Dermabond Prineo skin closure system. This is a combination of a mesh and a liquid adhesive that will assist with healing. The mesh is to remain on your incision for 2 weeks. A dry dressing (ABD and tape) will be placed over it and should be changed daily. Please make sure the person performing your dressing changes washes their hands before touching the dressing. You may take brief showers but do not run the water directly onto the wound. After your shower, remove your dressing and blot your incision dry with a clean, soft towel and replace the dry dressing. Do not allow the tape to come in contact with the mesh. Do not rub or apply any lotions or ointments to your incision site. Do not soak or scrub your wound. The mesh dressing will be removed during your two week follow-up appointment. If you experience drainage leaking from underneath the mesh or if it peels off before 2 weeks, please contact your orthopedic surgeons office. Showering 
-You may shower in approximately 4 days after your surgery.   
 
-Leave the dressing on during your shower without allowing the water to run over it. 
 
-Reminder- your brace can be removed while showering. Remember to not bend or twist while your brace is off.   
 
-Do not take a tub bath. Preventing blood clots 
-You have been given T.E.D. stockings to wear. Continue to wear these for 7 days after your discharge. Put them on in the morning and take them off at night.   
 
-They are used to increase your circulation and prevent blood clots from forming in your legs Pain management 
-take pain medication as prescribed; decrease the amount you use as your pain lessens 
-avoid alcoholic beverages while taking pain medication 
-avoid NSAIDS (Aleve, Ibuprofen, Aspirin) until your surgeon approves it 
-Please be aware that many medications contain Tylenol. We do not want you to over medicate so please read the information below as a guide.   Do not take more than 4 Grams of Tylenol in a 24 hour period. (There are 1000 milligrams in one Gram) Percocet contains 325 mg of Tylenol per tablet (do not take more than 12 tablets in 24 hours) Lortab contains 500 mg of Tylenol per tablet (do not take more than 8 tablets in 24 hours) Norco contains 325 mg of Tylenol per tablet (do not take more than 12 tablets in 24 hours). Diet 
-resume usual diet; drink plenty of fluids; eat foods high in fiber 
-It is important to have regular bowel movements. Pain medications may cause constipation. You may want to take a stool softener (such as Senokot-S or Colace) to prevent constipation. If constipation occurs, take a laxative (such as Dulcolax tablets, Milk of Magnesia, or a suppository). Laxatives should only be used if the above preventable measures have failed and you still have not had a bowel movement after three days ACO Transitions of Care [place] [address] Caroline Sanabria offers a voluntary care coordination program to provide high quality service and care to [facility] fee-for-service beneficiaries. Martin Gonzalez was designed to help you enhance your health and well-being through the following services: ? Transitions of Care  support for individuals who are transitioning from one care setting to another (example: Hospital to home). ? Chronic and Complex Care Coordination  support for individuals and caregivers of those with serious or chronic illnesses or with more than one chronic (ongoing) condition and those who take a number of different medications. If you meet specific medical criteria, a [facility] [address] may call you directly to coordinate your care with your primary care physician and your other care providers. For questions about the [facility] programs, please, contact your physicians office. For general questions or additional information about Accountable Care Organizations: 
Please visit www.medicare.gov/acos. html or call 1-800-MEDICARE (7-210.537.2968) TTY users should call 4-191.632.8000. Introducing [facility]! [facility] introduces [employer] patient portal. Now you can access parts of your medical record, email your doctor's office, and request medication refills online. 1. In your internet browser, go to [email or web address] 2. Click on the First Time User? Click Here link in the Sign In box. You will see the New Member Sign Up page. 3. Enter your [employer] Access Code exactly as it appears below. You will not need to use this code after youve completed the sign-up process. If you do not sign up before the expiration date, you must request a new code. · [employer] Access Code: [email or web address] Expires: 12/10/2017 10:42 AM 
 
4. Enter the last four digits of your Social Security Number (xxxx) and Date of Birth (mm/dd/yyyy) as indicated and click Submit. You will be taken to the next sign-up page. 5. Create a [employer] ID. This will be your [employer] login ID and cannot be changed, so think of one that is secure and easy to remember. 6. Create a [employer] password. You can change your password at any time. 7. Enter your Password Reset Question and Answer. This can be used at a later time if you forget your password. 8. Enter your e-mail address. You will receive e-mail notification when new information is available in [address] 9. Click Sign Up. You can now view and download portions of your medical record. 10. Click the Download Summary menu link to download a portable copy of your medical information. If you have questions, please visit the Frequently Asked Questions section of the [employer] website. Remember, [employer] is NOT to be used for urgent needs. For medical emergencies, dial 911. Now available from your iPhone and Android! Providers Seen During Your Hospitalization Provider Specialty Primary office phone Rivera Enciso MD Orthopedic Surgery [phone] Your Primary Care Physician (PCP) Primary Care Physician Office Phone Office Fax Natacha Devi [phone] You are allergic to the following Allergen Reactions Pcn (Penicillins) Swelling Swelling throat Shellfish Containing Products Swelling Swells throat and eyes Morphine Other (comments) FAST HEARTBEAT AND NAUSEA PER PATIENT Recent Documentation Height Weight BMI OB Status Smoking Status 1.676 m 106.1 kg 37.77 kg/m2 Hysterectomy Never Smoker Emergency Contacts Name Discharge Info Relation Home Work Mobile Jayla Smith CAREGIVER [3] Daughter [21] [phone] Ramonita Treviño NO [2] Mother [14] [phone] [address] CAREGIVER [3] Other Relative [6] [phone] Annabelle Alves  Spouse [3] [phone] Patient Belongings The following personal items are in your possession at time of discharge: 
  Dental Appliances: None         Home Medications: None   Jewelry: None  Clothing: None, With patient    Other Valuables: None Please provide this summary of care documentation to your next provider. Signatures-by signing, you are acknowledging that this After Visit Summary has been reviewed with you and you have received a copy. Patient Signature:  ____________________________________________________________ Date:  ____________________________________________________________  
  
Gilma Artist Provider Signature:  ____________________________________________________________ Date:  ____________________________________________________________

## 2017-11-07 NOTE — PERIOP NOTES
BS 75. Has not had any meds for diabetes since Sunday. Dr Gail Giordano notified and no orders received at this time.  Stated he would be in preop in approx 15 minutes and will assess at that time

## 2017-11-08 LAB
ANION GAP SERPL CALC-SCNC: 9 MMOL/L (ref 5–15)
BUN SERPL-MCNC: 7 MG/DL (ref 6–20)
BUN/CREAT SERPL: 11 (ref 12–20)
CALCIUM SERPL-MCNC: 7.9 MG/DL (ref 8.5–10.1)
CHLORIDE SERPL-SCNC: 109 MMOL/L (ref 97–108)
CO2 SERPL-SCNC: 18 MMOL/L (ref 21–32)
CREAT SERPL-MCNC: 0.61 MG/DL (ref 0.55–1.02)
GLUCOSE BLD STRIP.AUTO-MCNC: 118 MG/DL (ref 65–100)
GLUCOSE BLD STRIP.AUTO-MCNC: 145 MG/DL (ref 65–100)
GLUCOSE BLD STRIP.AUTO-MCNC: 164 MG/DL (ref 65–100)
GLUCOSE BLD STRIP.AUTO-MCNC: 186 MG/DL (ref 65–100)
GLUCOSE SERPL-MCNC: 124 MG/DL (ref 65–100)
HGB BLD-MCNC: 9.9 G/DL (ref 11.5–16)
POTASSIUM SERPL-SCNC: 4.1 MMOL/L (ref 3.5–5.1)
SERVICE CMNT-IMP: ABNORMAL
SODIUM SERPL-SCNC: 136 MMOL/L (ref 136–145)

## 2017-11-08 PROCEDURE — 74011250637 HC RX REV CODE- 250/637: Performed by: PHYSICIAN ASSISTANT

## 2017-11-08 PROCEDURE — 74011250637 HC RX REV CODE- 250/637: Performed by: NURSE PRACTITIONER

## 2017-11-08 PROCEDURE — 99218 HC RM OBSERVATION: CPT

## 2017-11-08 PROCEDURE — 74011250636 HC RX REV CODE- 250/636: Performed by: ORTHOPAEDIC SURGERY

## 2017-11-08 PROCEDURE — 82962 GLUCOSE BLOOD TEST: CPT

## 2017-11-08 PROCEDURE — 97535 SELF CARE MNGMENT TRAINING: CPT

## 2017-11-08 PROCEDURE — G8979 MOBILITY GOAL STATUS: HCPCS

## 2017-11-08 PROCEDURE — 85018 HEMOGLOBIN: CPT | Performed by: PHYSICIAN ASSISTANT

## 2017-11-08 PROCEDURE — 77010033678 HC OXYGEN DAILY

## 2017-11-08 PROCEDURE — 74011250637 HC RX REV CODE- 250/637: Performed by: ORTHOPAEDIC SURGERY

## 2017-11-08 PROCEDURE — 74011636637 HC RX REV CODE- 636/637: Performed by: NURSE PRACTITIONER

## 2017-11-08 PROCEDURE — G8988 SELF CARE GOAL STATUS: HCPCS

## 2017-11-08 PROCEDURE — 36415 COLL VENOUS BLD VENIPUNCTURE: CPT | Performed by: PHYSICIAN ASSISTANT

## 2017-11-08 PROCEDURE — 80048 BASIC METABOLIC PNL TOTAL CA: CPT | Performed by: PHYSICIAN ASSISTANT

## 2017-11-08 PROCEDURE — 97161 PT EVAL LOW COMPLEX 20 MIN: CPT

## 2017-11-08 PROCEDURE — 74011250636 HC RX REV CODE- 250/636: Performed by: PHYSICIAN ASSISTANT

## 2017-11-08 PROCEDURE — 97116 GAIT TRAINING THERAPY: CPT

## 2017-11-08 PROCEDURE — 97165 OT EVAL LOW COMPLEX 30 MIN: CPT

## 2017-11-08 PROCEDURE — 74011250636 HC RX REV CODE- 250/636: Performed by: NURSE PRACTITIONER

## 2017-11-08 PROCEDURE — G8978 MOBILITY CURRENT STATUS: HCPCS

## 2017-11-08 PROCEDURE — G8987 SELF CARE CURRENT STATUS: HCPCS

## 2017-11-08 RX ORDER — VANCOMYCIN 2 GRAM/500 ML IN 0.9 % SODIUM CHLORIDE INTRAVENOUS
2000 ONCE
Status: COMPLETED | OUTPATIENT
Start: 2017-11-08 | End: 2017-11-08

## 2017-11-08 RX ORDER — HYDROMORPHONE HYDROCHLORIDE 1 MG/ML
1 INJECTION, SOLUTION INTRAMUSCULAR; INTRAVENOUS; SUBCUTANEOUS
Status: DISCONTINUED | OUTPATIENT
Start: 2017-11-08 | End: 2017-11-08

## 2017-11-08 RX ORDER — OXYCODONE HYDROCHLORIDE 5 MG/1
10 TABLET ORAL
Status: DISCONTINUED | OUTPATIENT
Start: 2017-11-08 | End: 2017-11-10 | Stop reason: HOSPADM

## 2017-11-08 RX ORDER — CYCLOBENZAPRINE HCL 10 MG
10 TABLET ORAL
Status: DISCONTINUED | OUTPATIENT
Start: 2017-11-08 | End: 2017-11-08

## 2017-11-08 RX ORDER — DIAZEPAM 5 MG/1
5 TABLET ORAL
Status: DISCONTINUED | OUTPATIENT
Start: 2017-11-08 | End: 2017-11-10 | Stop reason: HOSPADM

## 2017-11-08 RX ORDER — OXYCODONE HYDROCHLORIDE 5 MG/1
5 TABLET ORAL
Status: DISCONTINUED | OUTPATIENT
Start: 2017-11-08 | End: 2017-11-10 | Stop reason: HOSPADM

## 2017-11-08 RX ORDER — GABAPENTIN 300 MG/1
300 CAPSULE ORAL 3 TIMES DAILY
Status: DISCONTINUED | OUTPATIENT
Start: 2017-11-08 | End: 2017-11-08

## 2017-11-08 RX ORDER — GABAPENTIN 100 MG/1
200 CAPSULE ORAL 3 TIMES DAILY
Status: DISCONTINUED | OUTPATIENT
Start: 2017-11-08 | End: 2017-11-08

## 2017-11-08 RX ORDER — MAGNESIUM CITRATE
296 SOLUTION, ORAL ORAL DAILY PRN
Status: DISCONTINUED | OUTPATIENT
Start: 2017-11-08 | End: 2017-11-10 | Stop reason: HOSPADM

## 2017-11-08 RX ORDER — POLYETHYLENE GLYCOL 3350 17 G/17G
17 POWDER, FOR SOLUTION ORAL 2 TIMES DAILY
Status: DISCONTINUED | OUTPATIENT
Start: 2017-11-08 | End: 2017-11-10 | Stop reason: HOSPADM

## 2017-11-08 RX ORDER — DEXAMETHASONE SODIUM PHOSPHATE 4 MG/ML
8 INJECTION, SOLUTION INTRA-ARTICULAR; INTRALESIONAL; INTRAMUSCULAR; INTRAVENOUS; SOFT TISSUE EVERY 8 HOURS
Status: COMPLETED | OUTPATIENT
Start: 2017-11-08 | End: 2017-11-09

## 2017-11-08 RX ORDER — INSULIN LISPRO 100 [IU]/ML
INJECTION, SOLUTION INTRAVENOUS; SUBCUTANEOUS
Status: DISCONTINUED | OUTPATIENT
Start: 2017-11-08 | End: 2017-11-10 | Stop reason: HOSPADM

## 2017-11-08 RX ORDER — METHOCARBAMOL 750 MG/1
750 TABLET, FILM COATED ORAL 3 TIMES DAILY
Status: DISCONTINUED | OUTPATIENT
Start: 2017-11-08 | End: 2017-11-10 | Stop reason: HOSPADM

## 2017-11-08 RX ORDER — MAGNESIUM SULFATE 100 %
4 CRYSTALS MISCELLANEOUS AS NEEDED
Status: DISCONTINUED | OUTPATIENT
Start: 2017-11-08 | End: 2017-11-10 | Stop reason: HOSPADM

## 2017-11-08 RX ORDER — DEXTROSE 50 % IN WATER (D50W) INTRAVENOUS SYRINGE
12.5-25 AS NEEDED
Status: DISCONTINUED | OUTPATIENT
Start: 2017-11-08 | End: 2017-11-10 | Stop reason: HOSPADM

## 2017-11-08 RX ORDER — GABAPENTIN 400 MG/1
400 CAPSULE ORAL 3 TIMES DAILY
Status: DISCONTINUED | OUTPATIENT
Start: 2017-11-08 | End: 2017-11-10 | Stop reason: HOSPADM

## 2017-11-08 RX ORDER — CYCLOBENZAPRINE HCL 10 MG
10 TABLET ORAL ONCE
Status: COMPLETED | OUTPATIENT
Start: 2017-11-08 | End: 2017-11-08

## 2017-11-08 RX ORDER — HYDROMORPHONE HYDROCHLORIDE 1 MG/ML
1 INJECTION, SOLUTION INTRAMUSCULAR; INTRAVENOUS; SUBCUTANEOUS
Status: DISCONTINUED | OUTPATIENT
Start: 2017-11-08 | End: 2017-11-10 | Stop reason: HOSPADM

## 2017-11-08 RX ADMIN — ACETAMINOPHEN 650 MG: 325 TABLET ORAL at 11:40

## 2017-11-08 RX ADMIN — OXYCODONE HYDROCHLORIDE 10 MG: 5 TABLET ORAL at 23:11

## 2017-11-08 RX ADMIN — VANCOMYCIN HYDROCHLORIDE 2000 MG: 10 INJECTION, POWDER, LYOPHILIZED, FOR SOLUTION INTRAVENOUS at 08:30

## 2017-11-08 RX ADMIN — HYDROMORPHONE HYDROCHLORIDE 1 MG: 1 INJECTION, SOLUTION INTRAMUSCULAR; INTRAVENOUS; SUBCUTANEOUS at 17:30

## 2017-11-08 RX ADMIN — CYCLOBENZAPRINE HYDROCHLORIDE 10 MG: 10 TABLET, FILM COATED ORAL at 00:40

## 2017-11-08 RX ADMIN — TRAZODONE HYDROCHLORIDE 100 MG: 100 TABLET ORAL at 08:56

## 2017-11-08 RX ADMIN — DIAZEPAM 5 MG: 5 TABLET ORAL at 12:15

## 2017-11-08 RX ADMIN — POLYETHYLENE GLYCOL 3350 17 G: 17 POWDER, FOR SOLUTION ORAL at 09:03

## 2017-11-08 RX ADMIN — ACETAMINOPHEN 650 MG: 325 TABLET ORAL at 23:11

## 2017-11-08 RX ADMIN — TOPIRAMATE 25 MG: 25 TABLET, FILM COATED ORAL at 08:56

## 2017-11-08 RX ADMIN — GABAPENTIN 200 MG: 100 CAPSULE ORAL at 12:15

## 2017-11-08 RX ADMIN — DOCUSATE SODIUM AND SENNOSIDES 1 TABLET: 8.6; 5 TABLET, FILM COATED ORAL at 17:02

## 2017-11-08 RX ADMIN — OXYCODONE HYDROCHLORIDE 10 MG: 5 TABLET ORAL at 14:23

## 2017-11-08 RX ADMIN — OXYCODONE HYDROCHLORIDE 10 MG: 5 TABLET ORAL at 10:23

## 2017-11-08 RX ADMIN — OXYCODONE HYDROCHLORIDE 10 MG: 5 TABLET ORAL at 18:39

## 2017-11-08 RX ADMIN — ACETAMINOPHEN 650 MG: 325 TABLET ORAL at 17:02

## 2017-11-08 RX ADMIN — POLYETHYLENE GLYCOL 3350 17 G: 17 POWDER, FOR SOLUTION ORAL at 17:02

## 2017-11-08 RX ADMIN — HYDROMORPHONE HYDROCHLORIDE 1 MG: 1 INJECTION, SOLUTION INTRAMUSCULAR; INTRAVENOUS; SUBCUTANEOUS at 13:22

## 2017-11-08 RX ADMIN — CYCLOBENZAPRINE HYDROCHLORIDE 10 MG: 10 TABLET, FILM COATED ORAL at 10:32

## 2017-11-08 RX ADMIN — ACETAMINOPHEN 650 MG: 325 TABLET ORAL at 06:04

## 2017-11-08 RX ADMIN — PRAVASTATIN SODIUM 40 MG: 40 TABLET ORAL at 08:56

## 2017-11-08 RX ADMIN — DEXAMETHASONE SODIUM PHOSPHATE 8 MG: 4 INJECTION, SOLUTION INTRAMUSCULAR; INTRAVENOUS at 14:14

## 2017-11-08 RX ADMIN — METHOCARBAMOL 750 MG: 750 TABLET ORAL at 17:30

## 2017-11-08 RX ADMIN — GABAPENTIN 400 MG: 400 CAPSULE ORAL at 23:11

## 2017-11-08 RX ADMIN — SODIUM CHLORIDE 500 ML: 900 INJECTION, SOLUTION INTRAVENOUS at 23:11

## 2017-11-08 RX ADMIN — POTASSIUM CHLORIDE 20 MEQ: 750 TABLET, FILM COATED, EXTENDED RELEASE ORAL at 08:55

## 2017-11-08 RX ADMIN — DEXAMETHASONE SODIUM PHOSPHATE 8 MG: 4 INJECTION, SOLUTION INTRAMUSCULAR; INTRAVENOUS at 23:11

## 2017-11-08 RX ADMIN — DOCUSATE SODIUM AND SENNOSIDES 1 TABLET: 8.6; 5 TABLET, FILM COATED ORAL at 08:56

## 2017-11-08 RX ADMIN — INSULIN LISPRO 2 UNITS: 100 INJECTION, SOLUTION INTRAVENOUS; SUBCUTANEOUS at 17:41

## 2017-11-08 RX ADMIN — MONTELUKAST SODIUM 10 MG: 10 TABLET, FILM COATED ORAL at 23:13

## 2017-11-08 RX ADMIN — TRAZODONE HYDROCHLORIDE 100 MG: 100 TABLET ORAL at 18:39

## 2017-11-08 RX ADMIN — METHOCARBAMOL 750 MG: 750 TABLET ORAL at 23:11

## 2017-11-08 NOTE — PROGRESS NOTES
Problem: Self Care Deficits Care Plan (Adult)  Goal: *Acute Goals and Plan of Care (Insert Text)  Occupational Therapy Goals  Initiated 11/8/2017    1. Patient will perform upper body dressing and lower body dressing with modified independence and pain management techniques using Reacher, Stocking Aid, Long Handled [employer], Elastic Shoe Laces and Dressing Stick PRN within 7 days. 2.  Patient will perform bathing with modified independence using most appropriate DME within 7 days. 3.  Patient will toileting and toilet transfer at modified independence within 7 days. 4.  Patient will don/doff back brace at minimal assistance/contact guard assist within 7 days. 5.  Patient will verbalize/demonstrate 3/3 back precautions during ADL tasks without cues within 7 days. Occupational Therapy EVALUATION  Patient: Maegan Miner (36 y.o. female)  Date: 11/8/2017  Primary Diagnosis: DDD L5-S1, FORAMINAL STENOSIS L5-S1  DDD (degenerative disc disease), lumbar  Procedure(s) (LRB):  LUMBAR LAMINECTOMY, POSTERIOR SPINAL FUSION L5-S1 (N/A) 1 Day Post-Op   Precautions:   Back, Fall (brace out of bed)    ASSESSMENT :  Based on the objective data described below, the patient presents with 8/10 pain at surgery site and 10/10 \"spasms pain. \" Patient post op day 1 from lum dillon fusion L5-S1. Patient performed functional mobility mod A x 2 in AM PT session, min A x 1 in PM PT session, now unable to get up from bed for OT with above reported pain and spasm levels. D ADLs and [id number] mobility at this time. Patient able to tolerate education portion of ADL training and demonstration of AE but unable to try for herself. Handout provided re lum dillon precautions, donning brace and energy conservation with lum dillon. [facility] long as pain is managed. Family present for 75% of training but then left. Expect pain level will limit carryover of todays training.  Unable to recall any back precautions until reminded. Patient will benefit from skilled intervention to address the above impairments. Patients rehabilitation potential is considered to be Good  Factors which may influence rehabilitation potential include:   []             None noted  []             Mental ability/status  []             Medical condition  []             Home/family situation and support systems  []             Safety awareness  [x]             Pain tolerance/management  []             Other:      PLAN :  Recommendations and Planned Interventions:  [x]               Self Care Training                  [x]        Therapeutic Activities  [x]               Functional Mobility Training    []        Cognitive Retraining  [x]               Therapeutic Exercises           [x]        Endurance Activities  [x]               Balance Training                   []        Neuromuscular Re-Education  []               Visual/Perceptual Training     [x]   Home Safety Training  [x]               Patient Education                 [x]        Family Training/Education  []               Other (comment):    Frequency/Duration: Patient will be followed by occupational therapy 5 times a week to address goals. Discharge Recommendations: Home Health  Further Equipment Recommendations for Discharge: MIO; CARLOS DILLON     SUBJECTIVE:   Patient stated I cannot unfold my legs due to the pain.     OBJECTIVE DATA SUMMARY:   HISTORY:   Past Medical History:   Diagnosis Date    Arthritis     lower back    Asthma     last attack 1994    Bipolar 1 disorder ([address]     Chronic neck pain     DDD (degenerative disc disease), cervical     followed by ortho    Diabetes mellitus type 2, controlled ([address]     GERD (gastroesophageal reflux disease)     High cholesterol     HTN, goal below 140/90     Migraines     Morbid obesity ([address]     SONY (obstructive sleep apnea)     DOESN'T USE CPAP     Past Surgical History:   Procedure Laterality Date    BREAST SURGERY PROCEDURE UNLISTED  13     RIGHT BREAST EXCISION OF MULTIPLE SEBACEOUS CYSTS    HX BREAST BIOPSY  2014    LEFT BREAST EXCISION SEBACEOUS CYST, RIGHT CHEST WALL SEBACEOUS CYST EXCISION performed by Abhijit Ruiz MD at [facility] MAIN OR    HX  SECTION      HX HEENT      wisdom teeth    HX ORTHOPAEDIC  2017    CERVICAL FUSION    HX OTHER SURGICAL  1997    spinal tap - viral menigitis    HX OTHER SURGICAL      SEBACEOUS CYST-BACK    HX TOTAL LAPAROSCOPIC HYSTERECTOMY W/ BS&O         Prior Level of Function/Environment/Context: mod I self care and functional mobility with pain  Occupations in which the patient is/was successful, what are the barriers preventing that success: pain limiting all self care at this time  Performance Patterns (routines, roles, habits, and rituals): on disability from nsg career  Personal Interests and/or values: family  Expanded or extensive additional review of patient history:     Home Situation  Home Environment: Private residence  # Steps to Enter: 1  Rails to Enter: No  One/Two Story Residence: Two story  # of Interior Steps: 14  Interior Rails: Both  Lift Chair Available: No  Living Alone: No  Support Systems: Spouse/Significant Other/Partner  Patient Expects to be Discharged to[de-identified] Private residence  Current DME Used/Available at Home: Nebulizer  [x]  Right hand dominant   []  Left hand dominant    EXAMINATION OF PERFORMANCE DEFICITS:  Cognitive/Behavioral Status:  Neurologic State: Alert; Appropriate for age  Orientation Level: Oriented X4  Cognition: Follows commands  Perception: Appears intact  Perseveration: No perseveration noted  Safety/Judgement: Fall prevention; Awareness of environment (learning back precautions)    Skin: incision covered    Edema: + B LEs    Hearing:   Auditory  Auditory Impairment: None    Vision/Perceptual:                           Acuity: Within Defined Limits         Range of Motion:  B UE  AROM: Generally decreased, functional  PROM: Within functional limits                      Strength:  B UE  Strength: Generally decreased, functional                Coordination:  Coordination: Generally decreased, functional  Fine Motor Skills-Upper: Left Intact; Right Intact    Gross Motor Skills-Upper: Left Intact; Right Intact    Tone & Sensation:  B UE  Tone: Normal  Sensation: Intact    patient reports muscle spasms that are currently debilitation B LEs                   Balance: Per PT only  Sitting: Intact; Impaired (not tolerated at 4 PM due to pain/spasms)  Standing: Impaired  Standing - Static: Good;Constant support  Standing - Dynamic : Fair    Functional Mobility and Transfers for ADLs: per PT  Bed Mobility:  Rolling: Minimum assistance; Total assistance (Min/Mod with PT; unable by 4PM due to P!)  Supine to Sit: Minimum assistance; Total assistance (refused PM due to 10/10 spasms and 8/10 P!)  Sit to Supine: Minimum assistance; Total assistance (min A earlier with PT; refused 4PM due to P!)    Transfers:  Sit to Stand: Minimum assistance; Additional time;Assist x1;Total assistance (see above)  Stand to Sit: Minimum assistance; Additional time;Assist x1;Total assistance (see above)  Bed to Chair: Minimum assistance; Adaptive equipment; Additional time; Total assistance (unable by PM session due to P!)  Toilet Transfer : Minimum assistance; Adaptive equipment; Additional time; Total assistance (elevated toilet seat needed)    ADL Assessment:  Feeding: Supervision    Oral Facial Hygiene/Grooming: Supervision; Moderate assistance    Bathing: Total assistance;Minimum assistance    Upper Body Dressing: Minimum assistance; Total assistance (max A brace with PT; refused OT due to pain; inferred )    Lower Body Dressing: Maximum assistance; Additional time; Total assistance (will benefit from LE AE to maintain prec.)    Toileting: Adaptive equipment; Additional time; Total assistance;Minimum assistance (toilet aide demonstrated)                ADL Intervention and task modifications:     ADL handout provided; trained bathroom safety, fall prevention, Bathroom DME, toilet aide and LE AE benefits for consistent back precautions and energy conservation  Trained pain management strategies including use of ice                                Cognitive Retraining  Safety/Judgement: Fall prevention; Awareness of environment (learning back precautions)      Functional Measure:  Barthel Index:    Bathin  Bladder: 0  Bowels: 5  Groomin  Dressin  Feeding: 10  Mobility: 0  Stairs: 0  Toilet Use: 0  Transfer (Bed to Chair and Back): 5  Total: 20       Barthel and G-code impairment scale:  Percentage of impairment CH  0% CI  1-19% CJ  20-39% CK  40-59% CL  60-79% CM  80-99% CN  100%   Barthel Score 0-100 100 99-80 79-60 59-40 20-39 1-19   0   Barthel Score 0-20 20 17-19 13-16 9-12 5-8 1-4 0      The Barthel ADL Index: Guidelines  1. The index should be used as a record of what a patient does, not as a record of what a patient could do. 2. The main aim is to establish degree of independence from any help, physical or verbal, however minor and for whatever reason. 3. The need for supervision renders the patient not independent. 4. A patient's performance should be established using the best available evidence. Asking the patient, friends/relatives and nurses are the usual sources, but direct observation and common sense are also important. However direct testing is not needed. 5. Usually the patient's performance over the preceding 24-48 hours is important, but occasionally longer periods will be relevant. 6. Middle categories imply that the patient supplies over 50 per cent of the effort. 7. Use of aids to be independent is allowed. Rayshawn Tilley., Barthel, D.W. (9304). Functional evaluation: the Barthel Index. [address] [facility] (14)2. CHHAYA Carlson, Carey Peterson., Yareli Herndon., Cristiane, [address] ().  Measuring the change indisability after inpatient rehabilitation; comparison of the responsiveness of the Barthel Index and Functional [place] Measure. Journal of Neurology, Neurosurgery, and Psychiatry, 66(4), 497-931. JULIO CESAR Durand, JASON Finney, & Alis Callahan M.A. (2004.) Assessment of post-stroke quality of life in cost-effectiveness studies: The usefulness of the Barthel Index and the EuroQoL-5D. Quality of Life Research, 13, 541-10       G codes: In compliance with CMSs Claims Based Outcome Reporting, the following G-code set was chosen for this patient based on their primary functional limitation being treated: The outcome measure chosen to determine the severity of the functional limitation was the Barthel Index with a score of 20/100 which was correlated with the impairment scale. ? Self Care:     - CURRENT STATUS: CL - 60%-79% impaired, limited or restricted    - GOAL STATUS: CI - 1%-19% impaired, limited or restricted    - D/C STATUS:  ---------------To be determined---------------     Occupational Therapy Evaluation Charge Determination   History Examination Decision-Making   LOW Complexity : Brief history review  MEDIUM Complexity : 3-5 performance deficits relating to physical, cognitive , or psychosocial skils that result in activity limitations and / or participation restrictions MEDIUM Complexity : Patient may present with comorbidities that affect occupational performnce. Miniml to moderate modification of tasks or assistance (eg, physical or verbal ) with assesment(s) is necessary to enable patient to complete evaluation       Based on the above components, the patient evaluation is determined to be of the following complexity level: LOW   Pain:  Pain Scale 1: Numeric (0 - 10)  Pain Intensity 1: 10  Pain Location 1: Back  Pain Orientation 1: Lower  Pain Description 1: Aching; Sharp  Pain Intervention(s) 1: Medication (see MAR); Ice  Activity Tolerance:   Poor this session; fair earlier today  Please refer to the flowsheet for vital signs taken during this treatment. After treatment:   [] Patient left in no apparent distress sitting up in chair  [x] Patient left in no apparent distress in bed  [x] Call bell left within reach  [x] Nursing notified  [] Caregiver present  [] Bed alarm activated    COMMUNICATION/EDUCATION:   The patients plan of care was discussed with: Registered Nurse. [x] Home safety education was provided and the patient/caregiver indicated understanding. [x] Patient/family have participated as able in goal setting and plan of care. [x] Patient/family agree to work toward stated goals and plan of care. [] Patient understands intent and goals of therapy, but is neutral about his/her participation. [] Patient is unable to participate in goal setting and plan of care. This patients plan of care is appropriate for delegation to \[facility]\"".     Thank you for this referral.  Brandon Galvin OTR/L  Time Calculation: 23 mins

## 2017-11-08 NOTE — PROGRESS NOTES
Problem: Falls - Risk of  Goal: *Absence of Falls  Document Ashley Fall Risk and appropriate interventions in the flowsheet.    Outcome: Progressing Towards Goal  Fall Risk Interventions:  Mobility Interventions: Communicate number of staff needed for ambulation/transfer, Patient to call before getting OOB, PT Consult for mobility concerns, Utilize walker, cane, or other assitive device         Medication Interventions: Patient to call before getting OOB, Teach patient to arise slowly    Elimination Interventions: Call light in reach, Patient to call for help with toileting needs

## 2017-11-08 NOTE — DIABETES MGMT
DTC Progress Note    Recommendations/ Comments: Chart review for hypoglycemia, BG 75 mg/dL yesterday while NPO for surgery. Otherwise results in range. Currently on clear liquids. A1c 5.6    DTC phi continue to follow    Chart reviewed on [facility]. Patient is a 38 y.o. female with known DM on no DM meds at home. A1c:   Lab Results   Component Value Date/Time    Hemoglobin A1c 5.6 10/04/2017 10:45 AM    Hemoglobin A1c 5.8 04/07/2017 12:15 PM       Recent Glucose Results:   Lab Results   Component Value Date/Time     (H) 11/08/2017 03:19 AM    GLUCPOC 118 (H) 11/08/2017 11:39 AM    GLUCPOC 164 (H) 11/08/2017 06:13 AM    GLUCPOC 82 11/07/2017 08:38 PM        Lab Results   Component Value Date/Time    Creatinine 0.61 11/08/2017 03:19 AM     Estimated Creatinine Clearance: 151 mL/min (based on Cr of 0.61). Active Orders   Diet    DIET CLEAR LIQUID        PO intake: No data found. Current hospital DM medication: lispro normal correction scale    Will continue to follow as needed.     Thank you  Paige Mendez RN, CDE

## 2017-11-08 NOTE — PROGRESS NOTES
Care Management Interventions  PCP Verified by CM: Yes  Transition of Care Consult (CM Consult): [address]: No  Reason Outside [place]:  (patient chose another company)  [address]: No  Discharge Durable Medical Equipment: No  Physical Therapy Consult: Yes  Occupational Therapy Consult: Yes  Speech Therapy Consult: No  Current Support Network: Lives with Spouse  Confirm Follow Up Transport: Family  Plan discussed with Pt/Family/Caregiver: Yes  Discharge Location  Discharge Placement: Home with home health    Chart reviewed for transitions of care, discussed patient during rounds. Cm met with patient at the bedside to explain role and offer support. Patient confirmed that she was independent prior to admission, confirmed her demographics. She had already done some research and would like to use [facility]. Referral sent to them via hoozin; waiting for response.   [employer] , [place]

## 2017-11-08 NOTE — PROGRESS NOTES
Problem: Mobility Impaired (Adult and Pediatric)  Goal: *Acute Goals and Plan of Care (Insert Text)  Physical Therapy Goals  Initiated 11/8/2017    1. Patient will move from supine to sit and sit to supine , scoot up and down and roll side to side in bed with modified independence within 4 days. 2. Patient will perform sit to stand with modified independence within 4 days. 3. Patient will ambulate with modified independence for 150 feet with the least restrictive device within 4 days. 4. Patient will ascend/descend 12 stairs with 2 handrail(s) with modified independence within 4 days. 5. Patient will verbalize and demonstrate understanding of spinal precautions (No bending, lifting greater than 5 lbs, or twisting; log-roll technique; frequent repositioning as instructed) within 4 days. physical Therapy EVALUATION  Patient: Saira Can (24 y.o. female)  Date: 11/8/2017  Primary Diagnosis: DDD L5-S1, FORAMINAL STENOSIS L5-S1  DDD (degenerative disc disease), lumbar  Procedure(s) (LRB):  LUMBAR LAMINECTOMY, POSTERIOR SPINAL FUSION L5-S1 (N/A) 1 Day Post-Op   Precautions:   (P) Back (brace OOB)    ASSESSMENT :  Based on the objective data described below, the patient presents with decreased LE strength, increased pain and muscle spasms, decreased balance requiring B UE support, and overall impaired mobility from baseline following L5-S1 fusion POD 1. PTA patient was independent with mobility. She is primarily limited by pain and is tearful throughout session with mobility. Currently, requires mod A x 2 for bed mobility and min A x 2 for all other transfers. Patient is able to state 3/3 back precautions without cues although requires cues for adherence especially regarding no twisting. Patient tolerated ambulation x 30 feet with B HHA and min A for balance. Patient with decreased step clearance and slow bertrand but no LOB noted. Patient is awaiting delivery of brace so returned to supine with mod A.   DIANE aware of patient pain and spasms. Anticipate good gains once these improve, recommend HHPT. Patient will benefit from skilled intervention to address the above impairments. Patients rehabilitation potential is considered to be Good  Factors which may influence rehabilitation potential include:   []         None noted  []         Mental ability/status  [x]         Medical condition  []         Home/family situation and support systems  []         Safety awareness  [x]         Pain tolerance/management  []         Other:      PLAN :  Recommendations and Planned Interventions:  [x]           Bed Mobility Training             [x]    Neuromuscular Re-Education  [x]           Transfer Training                   []    Orthotic/Prosthetic Training  [x]           Gait Training                         []    Modalities  [x]           Therapeutic Exercises           []    Edema Management/Control  [x]           Therapeutic Activities            [x]    Patient and Family Training/Education  []           Other (comment):    Frequency/Duration: Patient will be followed by physical therapy  twice daily to address goals. Discharge Recommendations: Home Health  Further Equipment Recommendations for Discharge: TBD     SUBJECTIVE:   Patient stated It's spasming terrible.     OBJECTIVE DATA SUMMARY:   HISTORY:    Past Medical History:   Diagnosis Date    Arthritis     lower back    Asthma     last attack 1994    Bipolar 1 disorder ([address]     Chronic neck pain     DDD (degenerative disc disease), cervical     followed by ortho    Diabetes mellitus type 2, controlled ([address]     GERD (gastroesophageal reflux disease)     High cholesterol     HTN, goal below 140/90     Migraines     Morbid obesity ([address]     SONY (obstructive sleep apnea)     DOESN'T USE CPAP     Past Surgical History:   Procedure Laterality Date    BREAST SURGERY PROCEDURE UNLISTED  2/21/13     RIGHT BREAST EXCISION OF MULTIPLE SEBACEOUS CYSTS    HX BREAST BIOPSY  2014    LEFT BREAST EXCISION SEBACEOUS CYST, RIGHT CHEST WALL SEBACEOUS CYST EXCISION performed by Adrian Davison MD at [facility] MAIN OR    HX  SECTION      HX HEENT      wisdom teeth    HX ORTHOPAEDIC  2017    CERVICAL FUSION    HX OTHER SURGICAL  1997    spinal tap - viral menigitis    HX OTHER SURGICAL      SEBACEOUS CYST-BACK    HX TOTAL LAPAROSCOPIC HYSTERECTOMY W/ BS&O       Prior Level of Function/Home Situation: independent  Personal factors and/or comorbidities impacting plan of care:     Home Situation  Home Environment: Private residence  # Steps to Enter: (P) 1  Rails to Enter: (P) No  One/Two Story Residence: (P) Two story  # of Interior Steps: (P) 14  Interior Rails: (P) Both  Living Alone: No  Support Systems: Spouse/Significant Other/Partner  Patient Expects to be Discharged to[de-identified] Private residence  Current DME Used/Available at Home: Nebulizer    EXAMINATION/PRESENTATION/DECISION MAKING:   Critical Behavior:  Neurologic State: Alert           Hearing: Auditory  Auditory Impairment: None  Skin:    Edema:   Range Of Motion:  AROM: Generally decreased, functional           PROM: Generally decreased, functional           Strength:    Strength: Generally decreased, functional                    Tone & Sensation:                                  Coordination:     Vision:      Functional Mobility:  Bed Mobility:  Rolling: Moderate assistance;Assist x1;Additional time  Supine to Sit: Moderate assistance;Assist x2; Additional time  Sit to Supine: Moderate assistance;Assist x2; Additional time     Transfers:  Sit to Stand: Minimum assistance;Assist x2; Additional time  Stand to Sit: Minimum assistance;Assist x1        Bed to Chair: Minimum assistance;Assist x1              Balance:   Sitting: Intact  Standing: Impaired  Standing - Static: Good;Constant support  Standing - Dynamic : Fair  Ambulation/Gait Training:  Distance (ft): 30 Feet (ft)  Assistive Device:  (B HHA)  Ambulation - Level of Assistance: Minimal assistance        Gait Abnormalities: Decreased step clearance; Antalgic              Speed/Pamela: Pace decreased (<100 feet/min)  Step Length: Right shortened;Left shortened                     Stairs: Therapeutic Exercises:       Functional Measure:  Timed up and go:    Timed Get Up And Go Test: 20     Timed Up and Go and G-code impairment scale:  Percentage of Impairment CH    0%   CI    1-19% CJ    20-39% CK    40-59% CL    60-79% CM    80-99% CN     100%   Timed   Score 0-56 10 11-12 13-14 15-16 17-18 19 20       < than 10 seconds=Normal  Greater then 13.5 seconds (in elderly)=Increased fall risk   Sebas ANNE, Bell Parikh, Randall COSTELLO. Predicting the probability for falls in community dwelling older adults using the Timed Up and Go Test. Phys Ther. 2000;80:896-903. G codes: In compliance with CMSs Claims Based Outcome Reporting, the following G-code set was chosen for this patient based on their primary functional limitation being treated: The outcome measure chosen to determine the severity of the functional limitation was the TUG with a score of 20 seconds which was correlated with the impairment scale. ? Mobility - Walking and Moving Around:     - CURRENT STATUS: CN - 100% impaired, limited or restricted    - GOAL STATUS: CM - 80%-99% impaired, limited or restricted    - D/C STATUS:  ---------------To be determined---------------          Pain:  Pain Scale 1: Numeric (0 - 10)  Pain Intensity 1: 10  Pain Location 1: Back  Pain Orientation 1: Lower  Pain Description 1: Aching  Pain Intervention(s) 1: Encouraged PCA; Ice;Repositioned  Activity Tolerance:   Limited by pain and spasm  Please refer to the flowsheet for vital signs taken during this treatment.   After treatment:   []         Patient left in no apparent distress sitting up in chair  [x]         Patient left in no apparent distress in bed  [x]         Call iyer left within reach  [x]         Nursing notified  [x]         Caregiver present  []         Bed alarm activated    COMMUNICATION/EDUCATION:   The patients plan of care was discussed with: Registered Nurse. [x]         Fall prevention education was provided and the patient/caregiver indicated understanding. [x]         Patient/family have participated as able in goal setting and plan of care. [x]         Patient/family agree to work toward stated goals and plan of care. []         Patient understands intent and goals of therapy, but is neutral about his/her participation. []         Patient is unable to participate in goal setting and plan of care.     Thank you for this referral.  Yolanda Philip, PT   Time Calculation: 23 mins

## 2017-11-08 NOTE — OP NOTES
[address]   [facility], [address] [place] [address]   OP NOTE       Name:  Donzetta Nissen   MR#:  [id number]   :  1975   Account #:  [de-identified]    Surgery Date:  2017   Date of Adm:  2017       ESTIMATED BLOOD LOSS:     SPECIMENS REMOVED:     PREOPERATIVE DIAGNOSES   1. Foraminal stenosis, L5-S1.   2. Right-sided radiculopathy. POSTOPERATIVE DIAGNOSES   1. Foraminal stenosis, L5-S1.   2. Right-sided radiculopathy. PROCEDURES PERFORMED   1. Lumbar laminectomy, bilaterally at L5-S1, bilateral decompression   L5-S1 nerve roots with wide foraminotomy, right side L5-S1.   2. Bilateral lateral fusion, L5-S1, using Globus spinal instrumentation   supplemented with cancellous allograft and bone morphogenic protein   (Infuse). SURGEON: AVIVA Carballo MD    ANESTHESIA: General.    INDICATIONS: A 82-year-old obese female with persistent complaints   of back and right-sided leg pain. Distribution was initially mechanical over a period of   time has become radicular. She is a very large lady, weighing   approximately 240 pounds. Given the extent of her pain and   dysfunction, and those findings clinically and radiologically and need   for a very wide decompression, a lumbar decompression coupled with   the spinal fusion offered. Potential benefits and complications   reviewed. DESCRIPTION OF PROCEDURE: The patient was brought to the   operating room in supine position. General anesthetic administered. The patient placed in the prone position on the Keanu frame and low   back region then prepped and draped in normal fashion. Preoperative   antibiotics administered. Thigh-high SHOLA hose and sequential pumps   applied to the patient's lower extremities. An incision was made extending from about the upper border of L4   down to the sacrum. Subcutaneous tissues then divided in line with the   incision down to the level of the fascia.  The fascia was incised in the midline and a subperiosteal dissection completed over the spinous   process and laminar surfaces, eventually exposing the transverse   processes of L5 bilaterally, as well as the sacral ala. Deep retractors   placed. Good hemostasis obtained. Complete laminectomy L5   completed. Partial laminectomy of L4 to allow visualization of the L5   and S1 nerve roots starting in the lateral recess zone and followed well   into the neural foraminal zone. A partial medial facetectomy completed   as required. I did a very wide foraminotomy over the L5 root on the   right side. At the end of the decompression, the root was felt to be free   and mobile. A large Silva ball probe passed well into the neural   foraminal zone without difficulty. The same was completed on the   patient's left side. The facet joint capsule and osteophytes about the   facet joint L5-S1 resected. I then placed drill holes into the pedicle of   L5 and S1. Pedicles were probed in a routine fashion. The patient's   bone quality excellent. I then decorticated the transverse processes,   the facet joint and the sacral ala. Approximately 30 mL of cancellous   allograft utilized and packed over that region. A small BMP (bone   morphogenic protein) kit was utilized as well. Screws measuring 6.5   mm in diameter, 40 mm in length with excellent purchase. These were   then connected with the juan manuel that was appropriately contoured, secured   using the locking cap and final  device. Slight amount of   distraction applied on each side to further open the foramen. The   wound was irrigated. Drain was placed. Fascia closed using #1 Vicryl   in subcutaneous tissues and skin closed using 2-0 and 3-0 Vicryl. The   patient awoken from the anesthetic, extubated, and taken to recovery   in stable condition.         MD MANISHA Espana / DESTIN   D:  11/07/2017   19:41   T:  11/08/2017   09:12   Job #:  [id number]

## 2017-11-08 NOTE — ANESTHESIA POSTPROCEDURE EVALUATION
Post-Anesthesia Evaluation and Assessment    Patient: Praker Kelley MRN: [id number]  SSN: [id number]    YOB: 1975  Age: 38 y.o. Sex: female       Cardiovascular Function/Vital Signs  Visit Vitals    /58    Pulse 73    Temp 36.4 °C (97.5 °F)    Resp 12    Ht 5' 6\" (1.676 m)    Wt 106.1 kg (234 lb)    SpO2 100%    BMI 37.77 kg/m2       Patient is status post general anesthesia for Procedure(s):  LUMBAR LAMINECTOMY, POSTERIOR SPINAL FUSION L5-S1. Nausea/Vomiting: None    Postoperative hydration reviewed and adequate. Pain:  Pain Scale 1: Numeric (0 - 10) (11/07/17 2035)  Pain Intensity 1: 8 (11/07/17 2035)   Managed    Neurological Status:   Neuro (WDL): Exceptions to WDL (11/07/17 2035)  Neuro  Neurologic State: Drowsy (11/07/17 2035)  LUE Motor Response: Purposeful (11/07/17 2035)  LLE Motor Response: Purposeful (11/07/17 2035)  RUE Motor Response: Purposeful (11/07/17 2035)  RLE Motor Response: Purposeful (11/07/17 2035)   At baseline    Mental Status and Level of Consciousness: Arousable    Pulmonary Status:   O2 Device: CO2 nasal cannula (11/07/17 2045)   Adequate oxygenation and airway patent    Complications related to anesthesia: None    Post-anesthesia assessment completed.  No concerns    Signed By: Tim Lee MD     November 7, 2017

## 2017-11-08 NOTE — PROGRESS NOTES
1130: Patient not receiving pain relief from Oxycodone 10mg and Flexeril 10mg given @1032. Patient reporting pain 10 out of 10 with muscle spasms. Spoke with ALIRIO Wolff regarding adding additional pain medications. Received order for 5mg of valium & 200mg of Gabapentin. Gave patient both @ 2382 and will reassess    1300: Patient is currently sobbing and inconsolably from the pain. Received order for 1mg of Dilaudid for breakthrough pain. Administered @1320 and will reassess patient    1400: Pain level is still 10 out of 10. Decadron ordered and administered. 10mg oxycodone given    1530: Patient reporting pain level as mild now and rates it as an 8 out of 10.  Will continue to assess

## 2017-11-08 NOTE — PROGRESS NOTES
TRANSFER - OUT REPORT:    Verbal report given to RN(name) on Kel Jaime  being transferred to [facility](unit) for routine post - op       Report consisted of patients Situation, Background, Assessment and   Recommendations(SBAR). Time Pre op antibiotic given:1823  Anesthesia Stop time: 2030  Altamirano Present on Transfer to floor:y  Order for Altamirano on Chart:y    Information from the following report(s) SBAR, Kardex, OR Summary, Procedure Summary, Intake/Output and MAR was reviewed with the receiving nurse. Opportunity for questions and clarification was provided. Is the patient on 02? YES       L/Min 2          Is the patient on a monitor? NO    Is the nurse transporting with the patient? YES    Surgical Waiting Area notified of patient's transfer from PACU? YES      The following personal items collected during your admission accompanied patient upon transfer:   Dental Appliance: Dental Appliances: None  Vision:    Hearing Aid:    Jewelry: Jewelry: None  Clothing: Clothing: None, With patient  Other Valuables:  Other Valuables: None  Valuables sent to safe:

## 2017-11-08 NOTE — PROGRESS NOTES
Primary Nurse Lisa Griffiths RN and Nicola Rangel RN performed a dual skin assessment on this patient No impairment noted  Turner score is 21

## 2017-11-08 NOTE — PROGRESS NOTES
Problem: Mobility Impaired (Adult and Pediatric)  Goal: *Acute Goals and Plan of Care (Insert Text)  Physical Therapy Goals  Initiated 11/8/2017    1. Patient will move from supine to sit and sit to supine , scoot up and down and roll side to side in bed with modified independence within 4 days. 2. Patient will perform sit to stand with modified independence within 4 days. 3. Patient will ambulate with modified independence for 150 feet with the least restrictive device within 4 days. 4. Patient will ascend/descend 12 stairs with 2 handrail(s) with modified independence within 4 days. 5. Patient will verbalize and demonstrate understanding of spinal precautions (No bending, lifting greater than 5 lbs, or twisting; log-roll technique; frequent repositioning as instructed) within 4 days. physical Therapy TREATMENT  Patient: Justin Larios (53 y.o. female)  Date: 11/8/2017  Diagnosis: DDD L5-S1, FORAMINAL STENOSIS L5-S1  DDD (degenerative disc disease), lumbar <principal problem not specified>  Procedure(s) (LRB):  LUMBAR LAMINECTOMY, POSTERIOR SPINAL FUSION L5-S1 (N/A) 1 Day Post-Op  Precautions: (P) Back (brace OOB)    ASSESSMENT:  Patient continues to progress toward goals slowly, remains limited by pain and tearful throughout session. RN aware and patient was medicated prior to session. With encouragement agreeable to change positions and ambulate short distance. Improving to overall min a x 1 although requires increased time for all mobility and cues for slowed breathing. Patient also does best with cues and instruction for everything happening in session. Able to tolerate ambulation x 10 feet with HHA and min A x 1. Patient returned to supine with min A. Ice donned to back and patient left in L sidelying. RN aware of progress. Continue to anticipate good gains as pain improves. Brace has arrived for next session. Encouraged patient to be OOB to chair for dinner. Recommend HHPT.   Progression toward goals:  []    Improving appropriately and progressing toward goals  [x]    Improving slowly and progressing toward goals  []    Not making progress toward goals and plan of care will be adjusted     PLAN:  Patient continues to benefit from skilled intervention to address the above impairments. Continue treatment per established plan of care. Discharge Recommendations:  Home Health  Further Equipment Recommendations for Discharge:  TBD     SUBJECTIVE:   Patient stated This is worse than having a baby.     OBJECTIVE DATA SUMMARY:   Critical Behavior:  Neurologic State: Alert           Functional Mobility Training:  Bed Mobility:  Rolling: Minimum assistance; Additional time  Supine to Sit: Minimum assistance; Additional time  Sit to Supine: Minimum assistance;Assist x1           Transfers:  Sit to Stand: Minimum assistance;Assist x1  Stand to Sit: Minimum assistance        Bed to Chair: Minimum assistance                    Balance:  Sitting: Intact  Standing: Impaired  Standing - Static: Good;Constant support  Standing - Dynamic : Fair  Ambulation/Gait Training:  Distance (ft): 15 Feet (ft)  Assistive Device:  (B HHA)  Ambulation - Level of Assistance: Minimal assistance        Gait Abnormalities: Decreased step clearance; Antalgic              Speed/Pamela: Pace decreased (<100 feet/min)  Step Length: Right shortened;Left shortened                   Stairs:            Neuro Re-Education:    Therapeutic Exercises:     Pain:  Pain Scale 1: Numeric (0 - 10)  Pain Intensity 1: 10  Pain Location 1: Back  Pain Orientation 1: Lower  Pain Description 1: Aching; Sharp  Pain Intervention(s) 1: Medication (see MAR); Ice  Activity Tolerance:   Limited by pain  Please refer to the flowsheet for vital signs taken during this treatment.   After treatment:   []    Patient left in no apparent distress sitting up in chair  [x]    Patient left in no apparent distress in bed  [x]    Call bell left within reach  [x]    Nursing notified  [x]    Caregiver present  []    Bed alarm activated    COMMUNICATION/COLLABORATION:   The patients plan of care was discussed with: Registered Nurse    Maggie Santiago, PT   Time Calculation: 19 mins

## 2017-11-08 NOTE — PROGRESS NOTES
Orthopedic Spine Progress Note  Toniomike Mayo, AGACNP-BC  Work Cell: [phone]    Post Op Day: 1 Day Post-Op    November 8, 2017 1:42 PM     Sheyla Andrade is a 38 y.o. female with history of chronic neck and low back pain. Her back pain radiates to her right buttock, right hip, and right lower leg. It is throbbing in nature. She has debilitating muscle spasms on occasion. Her MRI revealed an isolated L5-S1 foraminal stenosis. Due to her progressive pain, radiographic findings, and following a discussion of the risks and benefits, Ms. Princess Calderon consented to undergo a  Procedure(s):  LUMBAR LAMINECTOMY, POSTERIOR SPINAL FUSION L5-S1     Subjective  Ms. Jaime c/o of severe, intractable back and radiating leg pain after working with therapy. She is crying in bed lying on her right side. She has no weakness, numbness or tingling. Spasms are refractory to both valium and flexeril. Pain has continued despite oral oxycodone, IV dilaudid, APAP, repositioning. She endorses difficulty with deep inspiration secondary to her pain. Will initiate IV decadron for 3 doses and consider imaging if pain continues in this magnitude. She denies headache, vision changes, chest pain, abd pain, fever, chills, nausea, or vomiting. Objective:   General: alert, cooperative, no distress. EENT: EOMI. Anicteric sclerae. Oral mucous moist, oropharynx benign  Resp: CTA bilaterally. No wheezing/rhonchi/rales. No accessory muscle use  CV: Regular rhythm, normal rate, no murmurs, gallops, rubs. No cyanosis or clubbing. No edema appreciated in the extremities. Gastrointestinal:  Soft, non-tender. normoactive bowel sounds, no hepatosplenomegaly  Neurological: Follows commands. Speech clear. Affect normal.  MILES. Strength 5/5 in BUE and BLE. Sensation stable. Musculoskeletal:  Calves soft, supple, non-tender upon palpation or with passive stretch. Psych: Good insight. Not anxious nor agitated.   Skin: Incision - clean, dry and intact. No significant surrounding erythema or swelling. Dressing: clean, dry, and intact       Vital Signs:    Patient Vitals for the past 8 hrs:   BP Temp Pulse Resp SpO2   17 0842 107/58 97.6 °F (36.4 °C) 89 16 100 %     Temp (24hrs), Av.8 °F (36.6 °C), Min:96.8 °F (36 °C), Max:98.6 °F (37 °C)      Intake/Output:      1901 -  0700  In: 1400 [I.V.:1400]  Out: 0218 [Urine:1325; Drains:170]    Pain Control:   Pain Assessment  Pain Scale 1: Numeric (0 - 10)  Pain Intensity 1: 10  Pain Onset 1: post op  Pain Location 1: Back  Pain Orientation 1: Lower  Pain Description 1: Aching, Sharp  Pain Intervention(s) 1: Medication (see MAR)    LAB:    Recent Labs      17   0319   HGB  9.9*     Lab Results   Component Value Date/Time    Sodium 136 2017 03:19 AM    Potassium 4.1 2017 03:19 AM    Chloride 109 2017 03:19 AM    CO2 18 2017 03:19 AM    Glucose 124 2017 03:19 AM    BUN 7 2017 03:19 AM    Creatinine 0.61 2017 03:19 AM    Calcium 7.9 2017 03:19 AM       PT/OT:   Gait:  Gait  Speed/Pamela: Pace decreased (<100 feet/min)  Step Length: Right shortened, Left shortened  Gait Abnormalities: Decreased step clearance, Antalgic  Ambulation - Level of Assistance: Minimal assistance  Distance (ft): 30 Feet (ft)  Assistive Device:  (B HHA)                 Assessment/Plan:    1. 1 Day Post-Op Procedure(s):  LUMBAR LAMINECTOMY, POSTERIOR SPINAL FUSION L5-S1   -Continue PT/OT   -Start Gabapentin 300 mg TID and Decadron 8 mg Q8H for 3 doses. Cont PRN oxycodone 5-10 mg Q3H, flexeril 10mg TID PRN, cryotherapy, scheduled APAP. -Remove varela once mobilized   -Encourage incentive Spirometer   -Tolerating diet   -VTE Prophylaxes - TEDS & SCDs    2.  DM, type II, well controlled   -A1c 5.6 in October per preop PCP note   -Continue Accuchecks, SSI, diabetic diet   -Watch closely on decadron    3.   Hypertension   -BP borderline low this am.    -Cont Tenormin, Lasix dose from home with holding parameters    4. Hx of Migraines   -Topamax for prophylaxis    5. Morbid obesity with BMI of 36   -Counseling when appropriate    6. Hx of Asthma   -Sx controlled on Singulair    7. Chronic constipation   -Pt states she has gone weeks without a BM    -Continue Linzess from home   -Bowel regimen with BID pericolace, BID miralax, milk of magnesium   -Encourage mobilization, hydration, fiber   -Recommend GI follow-up on discharge           Discharge To:   Likely home with home health PT/OT on postop day 3 or 4, pending progress with therapies    Signed By: Alondra Carranza NP

## 2017-11-08 NOTE — PROGRESS NOTES
ORTHOPAEDIC LUMBAR FUSION PROGRESS NOTE    NAME: Maegan Miner   :  1975   MRN:  [id number]   DATE:  2017    POD: 1 Day Post-Op  S/P: Procedure(s):  LUMBAR LAMINECTOMY, POSTERIOR SPINAL FUSION L5-S1    SUBJECTIVE:    Patient found lying in bed, right side, awake & oriented. Notes back hurts at surgical site but denies leg pain/numbness. Afebrile/VSS. Discussed with patient need to work with PT/OT -OOB working on BLT, log roll, etc. Denies nausea/vomiting, chest pain, headache or shortness of breath. No other acute complaints at this time. Recent Labs      17   0319   HGB  9.9*   NA  136   K  4.1   CL  109*   CO2  18*   BUN  7   CREA  0.61   GLU  124*     Patient Vitals for the past 12 hrs:   BP Temp Pulse Resp SpO2   17 0401 118/74 97.8 °F (36.6 °C) 98 16 98 %   17 0146 118/72 98.6 °F (37 °C) 77 16 100 %   17 0042 108/67 97.9 °F (36.6 °C) 93 16 100 %   17 2324 110/64 97.5 °F (36.4 °C) 92 18 100 %   17 2222 133/84 96.8 °F (36 °C) 99 16 100 %   17 2145 124/64 - 83 16 100 %   17 2130 98/63 - 73 15 100 %   17 101/58 98 °F (36.7 °C) 73 12 100 %   17 2100 100/54 - 69 10 100 %   17 - - (!) 101 17 100 %   17 - - 82 9 100 %   17 105/52 97.5 °F (36.4 °C) 95 23 92 %   17 112/67 - 91 22 99 %   17 136/73 - (!) 101 21 100 %     Drain: 170 cc's    EXAM:  Positive strength/ROM bilat lower ext.   Neuro intact  Dressings clean, dry and intact     PLAN:  D/C PCA, change to PO pain medications  PT/OT, OOB  Advance regular diet  Ice packs to back for  PRN pain  Pain Control  D/c planning to home in 3-4 days, pending positive progression      Elkin Hernandez PA-C

## 2017-11-09 LAB
GLUCOSE BLD STRIP.AUTO-MCNC: 201 MG/DL (ref 65–100)
GLUCOSE BLD STRIP.AUTO-MCNC: 210 MG/DL (ref 65–100)
GLUCOSE BLD STRIP.AUTO-MCNC: 222 MG/DL (ref 65–100)
GLUCOSE BLD STRIP.AUTO-MCNC: 264 MG/DL (ref 65–100)
HGB BLD-MCNC: 10 G/DL (ref 11.5–16)
SERVICE CMNT-IMP: ABNORMAL

## 2017-11-09 PROCEDURE — 85018 HEMOGLOBIN: CPT | Performed by: PHYSICIAN ASSISTANT

## 2017-11-09 PROCEDURE — 74011250637 HC RX REV CODE- 250/637: Performed by: PHYSICIAN ASSISTANT

## 2017-11-09 PROCEDURE — 82962 GLUCOSE BLOOD TEST: CPT

## 2017-11-09 PROCEDURE — 97530 THERAPEUTIC ACTIVITIES: CPT

## 2017-11-09 PROCEDURE — 97535 SELF CARE MNGMENT TRAINING: CPT

## 2017-11-09 PROCEDURE — 36415 COLL VENOUS BLD VENIPUNCTURE: CPT | Performed by: PHYSICIAN ASSISTANT

## 2017-11-09 PROCEDURE — 97116 GAIT TRAINING THERAPY: CPT

## 2017-11-09 PROCEDURE — 74011250636 HC RX REV CODE- 250/636: Performed by: NURSE PRACTITIONER

## 2017-11-09 PROCEDURE — 99218 HC RM OBSERVATION: CPT

## 2017-11-09 PROCEDURE — 74011250637 HC RX REV CODE- 250/637: Performed by: NURSE PRACTITIONER

## 2017-11-09 PROCEDURE — 74011636637 HC RX REV CODE- 636/637: Performed by: NURSE PRACTITIONER

## 2017-11-09 RX ADMIN — DOCUSATE SODIUM AND SENNOSIDES 1 TABLET: 8.6; 5 TABLET, FILM COATED ORAL at 09:20

## 2017-11-09 RX ADMIN — Medication 10 ML: at 06:25

## 2017-11-09 RX ADMIN — POLYETHYLENE GLYCOL 3350 17 G: 17 POWDER, FOR SOLUTION ORAL at 17:49

## 2017-11-09 RX ADMIN — ACETAMINOPHEN 650 MG: 325 TABLET ORAL at 06:30

## 2017-11-09 RX ADMIN — DOCUSATE SODIUM AND SENNOSIDES 1 TABLET: 8.6; 5 TABLET, FILM COATED ORAL at 17:49

## 2017-11-09 RX ADMIN — GABAPENTIN 400 MG: 400 CAPSULE ORAL at 21:18

## 2017-11-09 RX ADMIN — TRAZODONE HYDROCHLORIDE 100 MG: 100 TABLET ORAL at 17:49

## 2017-11-09 RX ADMIN — Medication 10 ML: at 21:19

## 2017-11-09 RX ADMIN — INSULIN LISPRO 5 UNITS: 100 INJECTION, SOLUTION INTRAVENOUS; SUBCUTANEOUS at 17:50

## 2017-11-09 RX ADMIN — INSULIN LISPRO 3 UNITS: 100 INJECTION, SOLUTION INTRAVENOUS; SUBCUTANEOUS at 06:34

## 2017-11-09 RX ADMIN — PRAVASTATIN SODIUM 40 MG: 40 TABLET ORAL at 09:20

## 2017-11-09 RX ADMIN — MONTELUKAST SODIUM 10 MG: 10 TABLET, FILM COATED ORAL at 21:18

## 2017-11-09 RX ADMIN — OXYCODONE HYDROCHLORIDE 10 MG: 5 TABLET ORAL at 16:35

## 2017-11-09 RX ADMIN — POTASSIUM CHLORIDE 20 MEQ: 750 TABLET, FILM COATED, EXTENDED RELEASE ORAL at 09:20

## 2017-11-09 RX ADMIN — TRAZODONE HYDROCHLORIDE 100 MG: 100 TABLET ORAL at 09:20

## 2017-11-09 RX ADMIN — OXYCODONE HYDROCHLORIDE 10 MG: 5 TABLET ORAL at 21:18

## 2017-11-09 RX ADMIN — GABAPENTIN 400 MG: 400 CAPSULE ORAL at 09:20

## 2017-11-09 RX ADMIN — ACETAMINOPHEN 650 MG: 325 TABLET ORAL at 17:49

## 2017-11-09 RX ADMIN — GABAPENTIN 400 MG: 400 CAPSULE ORAL at 16:35

## 2017-11-09 RX ADMIN — ACETAMINOPHEN 650 MG: 325 TABLET ORAL at 12:03

## 2017-11-09 RX ADMIN — METHOCARBAMOL 750 MG: 750 TABLET ORAL at 21:18

## 2017-11-09 RX ADMIN — TOPIRAMATE 25 MG: 25 TABLET, FILM COATED ORAL at 09:20

## 2017-11-09 RX ADMIN — OXYCODONE HYDROCHLORIDE 10 MG: 5 TABLET ORAL at 04:48

## 2017-11-09 RX ADMIN — METHOCARBAMOL 750 MG: 750 TABLET ORAL at 16:35

## 2017-11-09 RX ADMIN — OXYCODONE HYDROCHLORIDE 10 MG: 5 TABLET ORAL at 12:37

## 2017-11-09 RX ADMIN — INSULIN LISPRO 3 UNITS: 100 INJECTION, SOLUTION INTRAVENOUS; SUBCUTANEOUS at 12:14

## 2017-11-09 RX ADMIN — METHOCARBAMOL 750 MG: 750 TABLET ORAL at 09:20

## 2017-11-09 RX ADMIN — POLYETHYLENE GLYCOL 3350 17 G: 17 POWDER, FOR SOLUTION ORAL at 09:21

## 2017-11-09 RX ADMIN — DEXAMETHASONE SODIUM PHOSPHATE 8 MG: 4 INJECTION, SOLUTION INTRAMUSCULAR; INTRAVENOUS at 06:25

## 2017-11-09 RX ADMIN — OXYCODONE HYDROCHLORIDE 10 MG: 5 TABLET ORAL at 08:36

## 2017-11-09 RX ADMIN — INSULIN LISPRO 2 UNITS: 100 INJECTION, SOLUTION INTRAVENOUS; SUBCUTANEOUS at 22:11

## 2017-11-09 NOTE — PROGRESS NOTES
Note entered on 11.9.17: Spiritual Care Partner Volunteer visited patient in [address] [place] [address] on 11.8. 17. Documented by:    Rev. Mercedes Gilmore M.Div, [facility]

## 2017-11-09 NOTE — PROGRESS NOTES
Problem: Mobility Impaired (Adult and Pediatric)  Goal: *Acute Goals and Plan of Care (Insert Text)  Physical Therapy Goals  Initiated 11/8/2017    1. Patient will move from supine to sit and sit to supine , scoot up and down and roll side to side in bed with modified independence within 4 days. 2. Patient will perform sit to stand with modified independence within 4 days. 3. Patient will ambulate with modified independence for 150 feet with the least restrictive device within 4 days. 4. Patient will ascend/descend 12 stairs with 2 handrail(s) with modified independence within 4 days. 5. Patient will verbalize and demonstrate understanding of spinal precautions (No bending, lifting greater than 5 lbs, or twisting; log-roll technique; frequent repositioning as instructed) within 4 days. physical Therapy TREATMENT  Patient: Laura Smith (25 y.o. female)  Date: 11/9/2017  Diagnosis: DDD L5-S1, FORAMINAL STENOSIS L5-S1  DDD (degenerative disc disease), lumbar <principal problem not specified>  Procedure(s) (LRB):  LUMBAR LAMINECTOMY, POSTERIOR SPINAL FUSION L5-S1 (N/A) 2 Days Post-Op  Precautions: Back, Fall (brace out of bed)    ASSESSMENT:  Patient seen 45 min after receiving pain medication and reports some improvement in spasms/pain today. Patient able to perform supine to sit with min assist mostly for trunk management and verbal cues for logroll technique. Patient sat EOB x 5 min while performing ADL's (brushing hair, donning gown) with good pain tolerance. Patient able to ambulate 150 ft with HHA x 1. Patient ambulates with guarded posture and slow bertrand, no LOB noted. Patient inquired about use of cane or RW, however explained to patient it is preferable to ambulate without AD for improved posture and spinal alignment for proper healing. Patient not utilizing HHA much this date, mostly for security. Patient left up in chair at end of session. Recommend HHPT at discharge.  Will follow up this afternoon to progress mobility and attempt stair training as able. Patient less anxious today, moving better overall. Patient anticipates discharge home tomorrow. Progression toward goals:  []      Improving appropriately and progressing toward goals  [x]      Improving slowly and progressing toward goals  []      Not making progress toward goals and plan of care will be adjusted     PLAN:  Patient continues to benefit from skilled intervention to address the above impairments. Continue treatment per established plan of care. Discharge Recommendations:  Home Health  Further Equipment Recommendations for Discharge:  none     SUBJECTIVE:   Patient stated It's a little better today.    The patient stated 3/3 back precautions. Reviewed all 3 with patient. OBJECTIVE DATA SUMMARY:   Critical Behavior:  Neurologic State: Alert, Appropriate for age  Orientation Level: Oriented X4  Cognition: Follows commands  Safety/Judgement: Fall prevention, Awareness of environment (learning back precautions)  Functional Mobility Training:  Bed Mobility:  Log Rolling: Stand-by asssistance  Supine to Sit: Minimum assistance; Additional time;Assist x1  Sit to Supine:  (NT - OOB to chair)           Brace donned with  moderate assistance    Transfers:  Sit to Stand: Minimum assistance; Additional time;Assist x1  Stand to Sit: Contact guard assistance; Additional time                             Balance:  Sitting: Intact  Standing: Impaired; Without support  Standing - Static: Good  Standing - Dynamic : Fair  Ambulation/Gait Training:  Distance (ft): 150 Feet (ft)  Assistive Device: Brace/Splint;Gait belt; Other (comment) (HHA x 1)  Ambulation - Level of Assistance: Minimal assistance;Assist x1        Gait Abnormalities: Antalgic;Decreased step clearance        Base of Support: Widened     Speed/Pamela: Pace decreased (<100 feet/min); Shuffled  Step Length: Left shortened;Right shortened             Pain:  Pain Scale 1: Numeric (0 - 10)  Pain Intensity 1: 8  Pain Location 1: Back;Buttocks  Pain Orientation 1: Lower;Left;Right  Pain Description 1: Aching;Radiating  Pain Intervention(s) 1: Repositioned  Activity Tolerance:   VSS  Please refer to the flowsheet for vital signs taken during this treatment.   After treatment:   [x]  Patient left in no apparent distress sitting up in chair  []  Patient left in no apparent distress in bed  [x]  Call bell left within reach  [x]  Nursing notified  []  Caregiver present  []  Bed alarm activated    COMMUNICATION/COLLABORATION:   The patients plan of care was discussed with: Registered Nurse    John Austin PT   Time Calculation: 32 mins

## 2017-11-09 NOTE — PROGRESS NOTES
Spiritual Care Partner Volunteer visited patient in [address] [place] [address] on 11.9. 17. Documented by:  Rev. Gigi Mejia M.Div, [facility]

## 2017-11-09 NOTE — PROGRESS NOTES
Bedside and Verbal shift change report given to [facility] (oncoming nurse) by Milvia Jackson (offgoing nurse). Report included the following information SBAR, Kardex, Procedure Summary, Intake/Output, MAR, Recent Results and Med Rec Status.

## 2017-11-09 NOTE — PROGRESS NOTES
Problem: Self Care Deficits Care Plan (Adult)  Goal: *Acute Goals and Plan of Care (Insert Text)  Occupational Therapy Goals  Initiated 11/8/2017    1. Patient will perform upper body dressing and lower body dressing with modified independence and pain management techniques using Reacher, Stocking Aid, Long Handled [employer], Elastic Shoe Laces and Dressing Stick PRN within 7 days. 2.  Patient will perform bathing with modified independence using most appropriate DME within 7 days. 3.  Patient will toileting and toilet transfer at modified independence within 7 days. 4.  Patient will don/doff back brace at minimal assistance/contact guard assist within 7 days. 5.  Patient will verbalize/demonstrate 3/3 back precautions during ADL tasks without cues within 7 days. Occupational Therapy TREATMENT  Patient: Bienvenido Silverio (26 y.o. female)  Date: 11/9/2017  Diagnosis: DDD L5-S1, FORAMINAL STENOSIS L5-S1  DDD (degenerative disc disease), lumbar <principal problem not specified>  Procedure(s) (LRB):  LUMBAR LAMINECTOMY, POSTERIOR SPINAL FUSION L5-S1 (N/A) 2 Days Post-Op  Precautions: Back, Fall (brace out of bed)    ASSESSMENT:    Chart reviewed and patient cleared for therapy. Therapist provided skilled education re: back precautions, DME/AE options and how to locate DME, and strategies for ADLs after spinal surgery. Patient demonstrated good understanding of education. Patient stated she wants a BSC, but her fiance and children will A with LB dressing. Therapist suggested getting a reacher for when her family is not around. Overall patient progressing well and should be able to d/c home with home health OT. She does require extended time for mobility but completed sit> stand with Min A today.      Progression toward goals:  []       Improving appropriately and progressing toward goals  [x]       Improving slowly and progressing toward goals  []       Not making progress toward goals and plan of care will be adjusted     PLAN:  Patient continues to benefit from skilled intervention to address the above impairments. Continue treatment per established plan of care. Discharge Recommendations:  Home Health  Further Equipment Recommendations for Discharge:   BSC, LB AE     SUBJECTIVE:   Patient stated I need a BSC but my kids and fiance can help me with dressing.     OBJECTIVE DATA SUMMARY:   Cognitive/Behavioral Status:         Pleasant and cooperative        Functional Mobility and Transfers for ADLs:  Bed Mobility:  Rolling: Stand-by asssistance  Supine to Sit: Minimum assistance; Additional time;Assist x1  Sit to Supine:  (NT - OOB to chair)    Transfers:  Sit to Stand: Minimum assistance       Balance:  Sitting: Intact  Standing: Impaired; Without support  Standing - Static: Good  Standing - Dynamic : Fair    ADL Intervention:        Patient received in chair. She had several questions for therapist about DME/AE. She stated she wants a BSC but has limited funds. Therapist provided ordering information as well as alternate ways to locate [facility]. Patient does not think a shower chair will fit in her walk in shower but therapist informed her she can use BSC if it does fit. Discouraged patient from placing shower chair/BSC in her Jacuzzi tub and told her it will be difficult to climb into tub. Discussed in detail strategies for ADLS (dressing/bathing) after spinal surgery. Bathing: Patient instructed and indicated understanding when bathing to not submerge wound in water, stand to shower or sponge bathe, and to use fresh washcloth/towel around surgical site    Toileting: Patient instructed on the benefits of using flushable wet wipes and toilet tongs if decreased reach or pain for betty care. Also, the benefits of a reacher to aid in clothing management.      Pain:  Pain Scale 1: Numeric (0 - 10)  Pain Intensity 1: 8  Pain Location 1: Back  Pain Orientation 1: Lower  Pain Description 1: Aching;Pressure  Pain Intervention(s) 1: Medication (see MAR)  Activity Tolerance:   Fair     Please refer to the flowsheet for vital signs taken during this treatment.   After treatment:   [x] Patient left in no apparent distress sitting up in chair  [] Patient left in no apparent distress in bed  [x] Call bell left within reach  [] Nursing notified  [] Caregiver present  [] Bed alarm activated    COMMUNICATION/COLLABORATION:   The patients plan of care was discussed with: Physical Therapist and Registered Nurse    Monica Denny OT  Time Calculation: 25 mins

## 2017-11-09 NOTE — PROGRESS NOTES
Problem: Mobility Impaired (Adult and Pediatric)  Goal: *Acute Goals and Plan of Care (Insert Text)  Physical Therapy Goals  Initiated 11/8/2017    1. Patient will move from supine to sit and sit to supine , scoot up and down and roll side to side in bed with modified independence within 4 days. 2. Patient will perform sit to stand with modified independence within 4 days. 3. Patient will ambulate with modified independence for 150 feet with the least restrictive device within 4 days. 4. Patient will ascend/descend 12 stairs with 2 handrail(s) with modified independence within 4 days. 5. Patient will verbalize and demonstrate understanding of spinal precautions (No bending, lifting greater than 5 lbs, or twisting; log-roll technique; frequent repositioning as instructed) within 4 days. physical Therapy TREATMENT  Patient: Dejan Salinas (42 y.o. female)  Date: 11/9/2017  Diagnosis: DDD L5-S1, FORAMINAL STENOSIS L5-S1  DDD (degenerative disc disease), lumbar <principal problem not specified>  Procedure(s) (LRB):  LUMBAR LAMINECTOMY, POSTERIOR SPINAL FUSION L5-S1 (N/A) 2 Days Post-Op  Precautions: Back, Fall (brace out of bed)    ASSESSMENT:  Patient in bed upon arrival and agreeable to ambulate. Patient able to perform supine to sit with min assist via logroll. Patient then ambulated 200 ft without AD and went up/down 4 steps with CGA for safety. Patient continues with guarded posture during gait, but able to ambulate without HHA or use of railing this date. Much improved from yesterday when patient was in debilitating pain and tearful per RN. Patient left up in chair at end of session. Reviewed spinal precautions, don/doffing of brace, and sitting restriction with patient and family. Anticipate patient will be safe for discharge home tomorrow with HHPT and family assist. Will follow up in the morning for continued gait and stair training prior to discharge. Patient has 14 steps up to her bedroom at home. Progression toward goals:  []      Improving appropriately and progressing toward goals  [x]      Improving slowly and progressing toward goals  []      Not making progress toward goals and plan of care will be adjusted     PLAN:  Patient continues to benefit from skilled intervention to address the above impairments. Continue treatment per established plan of care. Discharge Recommendations:  Home Health  Further Equipment Recommendations for Discharge:  none     SUBJECTIVE:   Patient stated I want to do whatever I can today. Let's try the stairs so I have a head start on tomorrow.    The patient stated 3/3 back precautions. Reviewed all 3 with patient. OBJECTIVE DATA SUMMARY:   Critical Behavior:  Neurologic State: Alert  Orientation Level: Oriented X4  Cognition: Follows commands, Appropriate decision making  Safety/Judgement: Fall prevention, Awareness of environment (learning back precautions)  Functional Mobility Training:  Bed Mobility:  Log Rolling: Stand-by asssistance  Supine to Sit: Minimum assistance; Additional time;Assist x1  Sit to Supine:  (NT - OOB to chair)           Brace donned with  moderate assistance    Transfers:  Sit to Stand: Contact guard assistance  Stand to Sit: Contact guard assistance                             Balance:  Sitting: Intact  Standing: Impaired; Without support  Standing - Static: Good  Standing - Dynamic : Fair  Ambulation/Gait Training:  Distance (ft): 200 Feet (ft)  Assistive Device: Brace/Splint;Gait belt  Ambulation - Level of Assistance: Contact guard assistance        Gait Abnormalities: Antalgic; Altered arm swing;Decreased step clearance        Base of Support: Widened     Speed/Pamela: Pace decreased (<100 feet/min); Shuffled  Step Length: Left shortened;Right shortened          Stairs:  Number of Stairs Trained: 4  Stairs - Level of Assistance: Contact guard assistance  Rail Use: Both    Pain:  Pain Scale 1: Numeric (0 - 10)  Pain Intensity 1: 8  Pain Location 1: Back  Pain Orientation 1: Lower  Pain Description 1: Aching;Pressure  Pain Intervention(s) 1: Medication (see MAR)  Activity Tolerance:   VSS  Please refer to the flowsheet for vital signs taken during this treatment.   After treatment:   [x]  Patient left in no apparent distress sitting up in chair  []  Patient left in no apparent distress in bed  [x]  Call bell left within reach  [x]  Nursing notified  [x]  Caregiver present  []  Bed alarm activated    COMMUNICATION/COLLABORATION:   The patients plan of care was discussed with: Registered Nurse    Jenny Reinoso, PT   Time Calculation: 20 mins

## 2017-11-09 NOTE — DIABETES MGMT
DTC Progress Note    Recommendations/ Comments: Review for hyperglycemia; No further low BS and BS have trended up > 180 mg/dl. Pt required 8 units of correction insulin in past 24 hours. If BS continue to trend upward:  1.  may benefit from 15 units of Lantus. 2. Also please add carb consistent to diet order. Chart reviewed on [facility]. Patient is a 38 y.o. female with known  Type 2 Diabetes on none at home. A1c:   Lab Results   Component Value Date/Time    Hemoglobin A1c 5.6 10/04/2017 10:45 AM    Hemoglobin A1c 5.8 04/07/2017 12:15 PM       Recent Glucose Results:   Lab Results   Component Value Date/Time    GLUCPOC 201 (H) 11/09/2017 12:05 PM    GLUCPOC 222 (H) 11/09/2017 05:30 AM    GLUCPOC 186 (H) 11/08/2017 10:57 PM        Lab Results   Component Value Date/Time    Creatinine 0.61 11/08/2017 03:19 AM     Estimated Creatinine Clearance: 151 mL/min (based on Cr of 0.61). Active Orders   Diet    DIET REGULAR        PO intake: No data found. Current hospital DM medication: lispro insulin correction scale    Will continue to follow as needed.     Thank you    Amanda Dyer RD, CDE

## 2017-11-09 NOTE — PROGRESS NOTES
CM following for discharge planning. [facility] has accepted pt for home health PT/OT. Anticipate discharge home tomorrow pending progression and pain control. Will follow.     CLAUDIA Mccall

## 2017-11-09 NOTE — PROGRESS NOTES
Problem: Falls - Risk of  Goal: *Absence of Falls  Document Ashley Fall Risk and appropriate interventions in the flowsheet.    Outcome: Progressing Towards Goal  Fall Risk Interventions:  Mobility Interventions: Communicate number of staff needed for ambulation/transfer, Patient to call before getting OOB, PT Consult for mobility concerns         Medication Interventions: Patient to call before getting OOB, Teach patient to arise slowly    Elimination Interventions: Call light in reach, Patient to call for help with toileting needs, Toileting schedule/hourly rounds

## 2017-11-09 NOTE — PROGRESS NOTES
Paged on call for Dr. Loco Gallardo Round returned page   Discussed patients blood pressure and  Requests for medications. Orders received.

## 2017-11-10 VITALS
HEART RATE: 110 BPM | OXYGEN SATURATION: 100 % | WEIGHT: 234 LBS | SYSTOLIC BLOOD PRESSURE: 136 MMHG | RESPIRATION RATE: 16 BRPM | BODY MASS INDEX: 37.61 KG/M2 | HEIGHT: 66 IN | DIASTOLIC BLOOD PRESSURE: 84 MMHG | TEMPERATURE: 99.3 F

## 2017-11-10 LAB
GLUCOSE BLD STRIP.AUTO-MCNC: 133 MG/DL (ref 65–100)
GLUCOSE BLD STRIP.AUTO-MCNC: 159 MG/DL (ref 65–100)
SERVICE CMNT-IMP: ABNORMAL
SERVICE CMNT-IMP: ABNORMAL

## 2017-11-10 PROCEDURE — 97530 THERAPEUTIC ACTIVITIES: CPT

## 2017-11-10 PROCEDURE — 97116 GAIT TRAINING THERAPY: CPT

## 2017-11-10 PROCEDURE — 99218 HC RM OBSERVATION: CPT

## 2017-11-10 PROCEDURE — 82962 GLUCOSE BLOOD TEST: CPT

## 2017-11-10 PROCEDURE — 74011250637 HC RX REV CODE- 250/637: Performed by: PHYSICIAN ASSISTANT

## 2017-11-10 PROCEDURE — 74011250637 HC RX REV CODE- 250/637: Performed by: NURSE PRACTITIONER

## 2017-11-10 RX ORDER — GABAPENTIN 400 MG/1
400 CAPSULE ORAL 2 TIMES DAILY
Qty: 30 CAP | Refills: 0 | Status: SHIPPED | OUTPATIENT
Start: 2017-11-10 | End: 2019-11-18

## 2017-11-10 RX ORDER — METHOCARBAMOL 750 MG/1
750 TABLET, FILM COATED ORAL 3 TIMES DAILY
Qty: 45 TAB | Refills: 0 | Status: SHIPPED | OUTPATIENT
Start: 2017-11-10 | End: 2019-03-26 | Stop reason: ALTCHOICE

## 2017-11-10 RX ORDER — OXYCODONE HYDROCHLORIDE 10 MG/1
10 TABLET ORAL
Qty: 60 TAB | Refills: 0 | Status: SHIPPED | OUTPATIENT
Start: 2017-11-10 | End: 2017-12-19

## 2017-11-10 RX ADMIN — GABAPENTIN 400 MG: 400 CAPSULE ORAL at 09:26

## 2017-11-10 RX ADMIN — METHOCARBAMOL 750 MG: 750 TABLET ORAL at 09:26

## 2017-11-10 RX ADMIN — ATENOLOL 25 MG: 25 TABLET ORAL at 09:23

## 2017-11-10 RX ADMIN — OXYCODONE HYDROCHLORIDE 10 MG: 5 TABLET ORAL at 09:25

## 2017-11-10 RX ADMIN — DOCUSATE SODIUM AND SENNOSIDES 1 TABLET: 8.6; 5 TABLET, FILM COATED ORAL at 09:25

## 2017-11-10 RX ADMIN — TRAZODONE HYDROCHLORIDE 100 MG: 100 TABLET ORAL at 09:26

## 2017-11-10 RX ADMIN — POLYETHYLENE GLYCOL 3350 17 G: 17 POWDER, FOR SOLUTION ORAL at 09:26

## 2017-11-10 RX ADMIN — TOPIRAMATE 25 MG: 25 TABLET, FILM COATED ORAL at 09:23

## 2017-11-10 RX ADMIN — ACETAMINOPHEN 650 MG: 325 TABLET ORAL at 04:55

## 2017-11-10 RX ADMIN — OXYCODONE HYDROCHLORIDE 10 MG: 5 TABLET ORAL at 04:54

## 2017-11-10 RX ADMIN — POTASSIUM CHLORIDE 20 MEQ: 750 TABLET, FILM COATED, EXTENDED RELEASE ORAL at 09:26

## 2017-11-10 RX ADMIN — OXYCODONE HYDROCHLORIDE 10 MG: 5 TABLET ORAL at 12:16

## 2017-11-10 RX ADMIN — PRAVASTATIN SODIUM 40 MG: 40 TABLET ORAL at 09:26

## 2017-11-10 NOTE — DISCHARGE SUMMARY
[address] [place] [address]   [address]  [phone]     Discharge Summary       PATIENT ID: Charmaine Francois  MRN: [id number]   YOB: 1975    DATE OF ADMISSION: 11/7/2017  2:33 PM    DATE OF DISCHARGE: 11/10/2017  PRIMARY CARE PROVIDER: Grover Curtis MD     CONSULTATIONS: None    PROCEDURES/SURGERIES: Procedure(s):  LUMBAR LAMINECTOMY, POSTERIOR SPINAL FUSION L5-S1    History of Present Illness:  Charmaine Francois is a 38 y.o. female with a history of chronic back and right leg pain. She had both mechanical and radicular complaints. Her imaging showed L5-S1 foraminal stenosis. After failing conservative therapy and a discussion of the risks, benefits, alternatives, perioperative course, and potential complications of surgery, she consented to undergo a Procedure(s):  LUMBAR LAMINECTOMY, POSTERIOR SPINAL FUSION L5-S1. Hospital Course:  Charmaine Francois tolerated the procedure well. She was transferred  to the recovery room in stable condition. After a brief stay the patient was then transferred to the Spinal Surgery Unit at [address] [place] [address].  On postoperative day #1, the dressing was clean and dry, she was neurovascularly intact. The patient was afebrile and vital signs were stable. Calves were soft and non-tender bilaterally. Her hospital course was complicated by debilitating postoperative pain. She was started on steroids, a scheduled muscle relaxant, and gabapentin. On postoperative day  # 2, the patient was tolerating a regular diet, passing flatus, and making slow progress with physical therapy. Her hemovac drain was removed on postoperative day #2.     Hemoglobin prior to discharge were   Lab Results   Component Value Date/Time    HGB 10.0 11/09/2017 06:15 AM        Charmaine Francois made satisfactory progress with physical therapy and was discharged to Home with home health in stable condition on postoperative day 3.  She was provided with routine postoperative instructions and advised to follow up with Herman Willis MD in 2 weeks following discharge from the hospital.      FOLLOW UP APPOINTMENTS:    Follow-up Information     Follow up With Details Comments Sheryl Caputos 94 Call in 1 day [place] PT/OT  [place] [address]  [phone]    Eddie Thomas NP On 11/14/2017 For discharge follow up at 10:30AM  [place] [address]  [phone]             ADDITIONAL CARE RECOMMENDATIONS:       When to call your Orthopaedic Surgeon:  -Signs of infection-if your incision is red; continues to have drainage; drainage has a foul odor or if you have a persistent fever over 101 degrees for 24 hours  -nausea or vomiting, severe headache  -loss of bowel or bladder function, inability to urinate  -changes in sensation in your arms or legs (numbness, tingling, loss of color)  -increased weakness-greater than before your surgery  -severe pain or pain not relieved by medications  -Signs of a blood clot in your leg-calf pain, tenderness, redness, swelling of lower leg  When to call your Primary Care Physician:  -Concerns about medical conditions such as diabetes, high blood pressure, asthma, congestive heart failure  -Call if blood sugars are elevated, persistent headache or dizziness, coughing or congestion, constipation or diarrhea, burning with urination, abnormal heart rate  When to call 911and go to the nearest emergency room  -acute onset of chest pain, shortness of breath, difficulty breathing    Activity  - Your only exercise should be walking. Start with short frequent walks and increase your walking distance each day.  -Limit the amount of time you sit to 20-30 minute intervals.   Sitting for prolonged periods of time will be uncomfortable for you following surgery.  -Do NOT lift anything over 5 pounds  -Do NOT do any straining, twisting or bending  -When you are in bed, you may lay on your back or on either side. Do NOT lie on your stomach    Brace - if ordered by surgeon   -If you have a back brace, you should wear your brace at all times when you are out of bed. Do not wear the brace while in bed or showering.  -Remember to always wear a cotton t-shirt underneath your brace.  -Do not bend or twist when your brace is off      Driving  -You may not drive or return to work until instructed by your physician. However, you may ride in the car for short periods of time. Incision Care  Your incision has been closed with absorbable sutures and the Dermabond Prineo skin closure system. This is a combination of a mesh and a liquid adhesive that will assist with healing. The mesh is to remain on your incision for 2 weeks. A dry dressing (ABD and tape) will be placed over it and should be changed daily. Please make sure the person performing your dressing changes washes their hands before touching the dressing. You may take brief showers but do not run the water directly onto the wound. After your shower, remove your dressing and blot your incision dry with a clean, soft towel and replace the dry dressing. Do not allow the tape to come in contact with the mesh. Do not rub or apply any lotions or ointments to your incision site. Do not soak or scrub your wound. The mesh dressing will be removed during your two week follow-up appointment. If you experience drainage leaking from underneath the mesh or if it peels off before 2 weeks, please contact your orthopedic surgeons office. Showering  -You may shower in approximately 4 days after your surgery.      -Leave the dressing on during your shower without allowing the water to run over it.    -Reminder- your brace can be removed while showering. Remember to not bend or twist while your brace is off.      -Do not take a tub bath.     Preventing blood clots  -You have been given T.E.D. stockings to wear. Continue to wear these for 7 days after your discharge. Put them on in the morning and take them off at night.      -They are used to increase your circulation and prevent blood clots from forming in your legs    Pain management  -take pain medication as prescribed; decrease the amount you use as your pain lessens  -avoid alcoholic beverages while taking pain medication  -avoid NSAIDS (Aleve, Ibuprofen, Aspirin) until your surgeon approves it  -Please be aware that many medications contain Tylenol. We do not want you to over medicate so please read the information below as a guide. Do not take more than 4 Grams of Tylenol in a 24 hour period. (There are 1000 milligrams in one Gram)  Percocet contains 325 mg of Tylenol per tablet (do not take more than 12 tablets in 24 hours)  Lortab contains 500 mg of Tylenol per tablet (do not take more than 8 tablets in 24 hours)  Norco contains 325 mg of Tylenol per tablet (do not take more than 12 tablets in 24 hours). Diet  -resume usual diet; drink plenty of fluids; eat foods high in fiber  -It is important to have regular bowel movements. Pain medications may cause constipation. You may want to take a stool softener (such as Senokot-S or Colace) to prevent constipation. If constipation occurs, take a laxative (such as Dulcolax tablets, Milk of Magnesia, or a suppository). Laxatives should only be used if the above preventable measures have failed and you still have not had a bowel movement after three days    DISCHARGE MEDICATIONS:  Current Discharge Medication List      START taking these medications    Details   gabapentin (NEURONTIN) 400 mg capsule Take 1 Cap by mouth two (2) times a day. Qty: 30 Cap, Refills: 0      methocarbamol (ROBAXIN) 750 mg tablet Take 1 Tab by mouth three (3) times daily.   Qty: 45 Tab, Refills: 0      oxyCODONE IR (ROXICODONE) 10 mg tab immediate release tablet Take 1 Tab by mouth every four (4) hours as needed. Max Daily Amount: 60 mg.  Qty: 60 Tab, Refills: 0         CONTINUE these medications which have NOT CHANGED    Details   topiramate (TOPAMAX) 25 mg tablet TAKE 1 TABLET TWICE A DAY WITH MEALS  Qty: 60 Tab, Refills: 4    Associated Diagnoses: Obesity (BMI 30-39.9)      traZODone (DESYREL) 100 mg tablet take 1 tablet by mouth at bedtime  Qty: 90 Tab, Refills: 3      pravastatin (PRAVACHOL) 40 mg tablet TAKE 1 TABLET BY MOUTH IN THE EVENING  Qty: 90 Tab, Refills: 0    Associated Diagnoses: Hyperlipidemia LDL goal <100      montelukast (SINGULAIR) 10 mg tablet take 1 tablet by mouth once daily  Qty: 30 Tab, Refills: 6      potassium chloride (K-DUR, KLOR-CON) 20 mEq tablet take 1 tablet by mouth once daily  Qty: 30 Tab, Refills: 5      linaclotide (LINZESS) 145 mcg cap capsule Take 1 Cap by mouth Daily (before breakfast). Qty: 30 Cap, Refills: 2    Associated Diagnoses: Constipation, unspecified constipation type      furosemide (LASIX) 40 mg tablet take 1 tablet by mouth once daily  Qty: 30 Tab, Refills: 1    Comments: pt needs to schedile appt      atenolol (TENORMIN) 25 mg tablet take 1 tablet by mouth once daily  Qty: 30 Tab, Refills: 3             PHYSICAL EXAMINATION AT DISCHARGE:  General: Pleasant, alert, cooperative, no distress. EENT: EOMI. Anicteric sclerae. Oral mucous moist, oropharynx benign. Resp: CTA bilaterally. No wheezing/rhonchi/rales. No accessory muscle use. CV: Regular rhythm, normal rate, no murmurs, gallops, rubs. No cyanosis or clubbing. No edema appreciated in the extremities. Gastrointestinal:  Soft, non-tender, non-distended. normoactive bowel sounds, no hepatosplenomegaly  Neurological: Follows commands. MILES. Speech clear. Sensation intact to light touch. Motor: unchanged C5-T1 and L2-S1. Musculoskeletal:  Calves soft, supple, non-tender upon palpation or with passive stretch. Psych: Good insight. Not anxious nor agitated. Skin: Good turgor. No rashes or lesions. Incision - clean, dry and intact. No significant erythema or swelling.       CHRONIC MEDICAL DIAGNOSES:  Problem List as of 11/10/2017  Date Reviewed: 10/9/2017          Codes Class Noted - Resolved    DDD (degenerative disc disease), lumbar ICD-10-CM: M51.36  ICD-9-CM: 722.52  11/7/2017 - Present        Well controlled type 2 diabetes mellitus ([facility] ICD-10-CM: E11.9  ICD-9-CM: 250.00  9/11/2017 - Present        Cervical stenosis of spine ICD-10-CM: M48.02  ICD-9-CM: 723.0  5/1/2017 - Present        Chronic neck pain ICD-10-CM: M54.2, G89.29  ICD-9-CM: 723.1, 338.29  Unknown - Present        SONY (obstructive sleep apnea) ICD-10-CM: G47.33  ICD-9-CM: 327.23  Unknown - Present        DDD (degenerative disc disease), cervical ICD-10-CM: M50.30  ICD-9-CM: 722.4  Unknown - Present    Overview Signed 2/23/2017  1:47 PM by Bjorn Louis MD     followed by ortho             Arthritis ICD-10-CM: M19.90  ICD-9-CM: 716.90  Unknown - Present    Overview Signed 1/9/2017  9:06 PM by Bjorn Louis MD     lower back             Asthma ICD-10-CM: J45.909  ICD-9-CM: 493.90  Unknown - Present    Overview Signed 1/9/2017  9:06 PM by Bjorn Louis MD     last attack 1994             Bipolar 1 disorder ([facility] ICD-10-CM: F31.9  ICD-9-CM: 296.7  Unknown - Present        GERD (gastroesophageal reflux disease) ICD-10-CM: K21.9  ICD-9-CM: 530.81  Unknown - Present        High cholesterol ICD-10-CM: E78.00  ICD-9-CM: 272.0  Unknown - Present        HTN, goal below 140/90 ICD-10-CM: I10  ICD-9-CM: 401.9  Unknown - Present        Migraines ICD-10-CM: G43.909  ICD-9-CM: 346.90  Unknown - Present        Morbid obesity ([facility] ICD-10-CM: E66.01  ICD-9-CM: 278.01  Unknown - Present        Unspecified sleep apnea ICD-10-CM: G47.30  ICD-9-CM: 780.57  Unknown - Present    Overview Signed 1/9/2017  9:06 PM by Bjorn Louis MD     no cpap-NEVER TESTED             Diabetes mellitus type 2, controlled ([facility] ICD-10-CM: E11.9  ICD-9-CM: 250.00 9/14/2016 - Present    Overview Signed 9/14/2016  1:37 PM by Yonas Felder DO     a1c 7.4% at outside EMR             RESOLVED: Anemia ICD-10-CM: D64.9  ICD-9-CM: 285. 9  Unknown - 4/7/2017        RESOLVED: Chronic pain ICD-10-CM: G89.29  ICD-9-CM: 338.29  Unknown - 4/7/2017              Signed:   Doris Peña NP  11/10/2017  12:50 PM

## 2017-11-10 NOTE — PROGRESS NOTES
Occupational Therapy    Patient asking about BSC. Given list of DME supply stores and FREE foundation application. Patient reporting they will probably use [facility] medical store, gave phone number. Patient asking about safety in returning to sexual intercourse, recommend referring to MD at follow up appointments. Patient preparing for dc home with family. No other questions or concerns.      Thank you,    Valentin Mckinley OTR/L

## 2017-11-10 NOTE — PROGRESS NOTES
Problem: Mobility Impaired (Adult and Pediatric)  Goal: *Acute Goals and Plan of Care (Insert Text)  Physical Therapy Goals  Initiated 11/8/2017    1. Patient will move from supine to sit and sit to supine , scoot up and down and roll side to side in bed with modified independence within 4 days. 2. Patient will perform sit to stand with modified independence within 4 days. 3. Patient will ambulate with modified independence for 150 feet with the least restrictive device within 4 days. 4. Patient will ascend/descend 12 stairs with 2 handrail(s) with modified independence within 4 days. 5. Patient will verbalize and demonstrate understanding of spinal precautions (No bending, lifting greater than 5 lbs, or twisting; log-roll technique; frequent repositioning as instructed) within 4 days. physical Therapy TREATMENT  Patient: Slava Parikh (33 y.o. female)  Date: 11/10/2017  Diagnosis: DDD L5-S1, FORAMINAL STENOSIS L5-S1  DDD (degenerative disc disease), lumbar <principal problem not specified>  Procedure(s) (LRB):  LUMBAR LAMINECTOMY, POSTERIOR SPINAL FUSION L5-S1 (N/A) 3 Days Post-Op  Precautions: Back, Fall (brace out of bed)    ASSESSMENT:  Pt received sidelying in bed and agreeable to work with therapy to sit up in chair for breakfast. Pt required Min A and additional time to come to sitting from the sidelying position. Pt maintained back precautions throughout transfer and only required light Min A to bring trunk upright from bed flat position. Min A to stand with hand held support. Good balance and able to adjust brace in standing before transferring to the chair to eat breakfast. PT returned after breakfast from continued gait and stair training. Pt ambulates and completed stairs with SBA. Pt ambulates with a guarded position and very stiff. Educated on importance of position changes at home to avoid this. Completed 13 steps similar to her home environment without need for verbal/safety cues.  Pt moves slow but safely and will have great family support. Plan for discharge today with HHPT. Pt is cleared from a mobility stand point for discharge given the family support she will have with transfers. Progression toward goals:  [x]      Improving appropriately and progressing toward goals  []      Improving slowly and progressing toward goals  []      Not making progress toward goals and plan of care will be adjusted     PLAN:  Patient continues to benefit from skilled intervention to address the above impairments. Continue treatment per established plan of care. Discharge Recommendations:  Home Health  Further Equipment Recommendations for Discharge:  Asking about a BSC. OT in to discuss     SUBJECTIVE:   Patient stated I want to go home but then I will be moving constantly.    The patient stated 3/3 back precautions. Reviewed all 3 with patient. OBJECTIVE DATA SUMMARY:   Critical Behavior:  Neurologic State: Alert  Orientation Level: Oriented X4  Cognition: Follows commands  Safety/Judgement: Fall prevention, Awareness of environment (learning back precautions)  Functional Mobility Training:  Bed Mobility:  Log    Supine to Sit: Minimum assistance; Additional time;Assist x1              Brace donned with  moderate assistance    Transfers:  Sit to Stand: Minimum assistance;Assist x1;Additional time (hand held assist for confidence)  Stand to Sit: Contact guard assistance        Bed to Chair: Contact guard assistance                    Balance:  Sitting: Intact  Standing: Impaired  Standing - Static: Good  Standing - Dynamic : Fair  Ambulation/Gait Training:  Distance (ft): 140 Feet (ft)  Assistive Device: Brace/Splint;Gait belt  Ambulation - Level of Assistance: Stand-by asssistance        Gait Abnormalities: Antalgic;Decreased step clearance        Base of Support: Widened     Speed/Pamela: Pace decreased (<100 feet/min)  Step Length: Right shortened;Left shortened              Stairs:  Number of Stairs Trained: 14  Stairs - Level of Assistance: Stand-by asssistance  Rail Use: Both  Activity Tolerance:   Good  Please refer to the flowsheet for vital signs taken during this treatment.   After treatment:   [x]  Patient left in no apparent distress sitting up in chair  []  Patient left in no apparent distress in bed  [x]  Call bell left within reach  [x]  Nursing notified  []  Caregiver present  []  Bed alarm activated    COMMUNICATION/COLLABORATION:   The patients plan of care was discussed with: Registered Nurse    Renato Velazquez, PT   Time Calculation: 39 mins

## 2017-11-10 NOTE — PROGRESS NOTES
Problem: Discharge Planning  Goal: *Discharge to safe environment  Outcome: Resolved/Met Date Met: 11/10/17  Pt discharged home with [facility] PT/OT, family support, and outpatient follow up. PCP appointment scheduled. See AVS for appointment information. CM met with patient at the bedside to discuss IM letter, [address] letter, and observation letter. Pt verbalized understanding and signed documents which have been placed in bedside chart. Pt was provided copies of documents to take home. Family providing transportation. No barriers to discharge identified. CM provided opportunity for questions/concerns. None voiced at this time.     CLAUDIA Spears

## 2017-11-10 NOTE — DISCHARGE INSTRUCTIONS
After Hospital Care Plan:  Discharge Instructions Lumbar Fusion Surgery   Dr. Gardiner Dandy, PA-C Lindajo Maiden, PA-C    Patient Name Maegan Miner    Date of procedure: 11/7/11      Date of discharge: 11/10/11    Procedure (Procedure(s):  LUMBAR LAMINECTOMY, POSTERIOR SPINAL FUSION L5-S1)    Surgeon:  Teagan Little MD       PCP:    Follow up appointments  -follow up with Dr. Chairez Found in 2 weeks. Call [phone], ext [phone] to make an appointment as soon as you get home from the hospital.    [facility]: _________________________   phone: _______________________  The agency will contact you to arrange dates/times for visits. Please call them if you do not hear from them within 24 hours after you are discharged      When to call your Orthopaedic Surgeon:  -Signs of infection-if your incision is red; continues to have drainage; drainage has a foul odor or if you have a persistent fever over 101 degrees for 24 hours  -nausea or vomiting, severe headache  -loss of bowel or bladder function, inability to urinate  -changes in sensation in your arms or legs (numbness, tingling, loss of color)  -increased weakness-greater than before your surgery  -severe pain or pain not relieved by medications  -Signs of a blood clot in your leg-calf pain, tenderness, redness, swelling of lower leg  When to call your Primary Care Physician:  -Concerns about medical conditions such as diabetes, high blood pressure, asthma, congestive heart failure  -Call if blood sugars are elevated, persistent headache or dizziness, coughing or congestion, constipation or diarrhea, burning with urination, abnormal heart rate  When to call 911and go to the nearest emergency room  -acute onset of chest pain, shortness of breath, difficulty breathing    Activity  - Your only exercise should be walking.   Start with short frequent walks and increase your walking distance each day.  -Limit the amount of time you sit to 20-30 minute intervals. Sitting for prolonged periods of time will be uncomfortable for you following surgery.  -Do NOT lift anything over 5 pounds  -Do NOT do any straining, twisting or bending  -When you are in bed, you may lay on your back or on either side. Do NOT lie on your stomach    Brace - if ordered by surgeon   -If you have a back brace, you should wear your brace at all times when you are out of bed. Do not wear the brace while in bed or showering.  -Remember to always wear a cotton t-shirt underneath your brace.  -Do not bend or twist when your brace is off      Driving  -You may not drive or return to work until instructed by your physician. However, you may ride in the car for short periods of time. Incision Care  Your incision has been closed with absorbable sutures and the Dermabond Prineo skin closure system. This is a combination of a mesh and a liquid adhesive that will assist with healing. The mesh is to remain on your incision for 2 weeks. A dry dressing (ABD and tape) will be placed over it and should be changed daily. Please make sure the person performing your dressing changes washes their hands before touching the dressing. You may take brief showers but do not run the water directly onto the wound. After your shower, remove your dressing and blot your incision dry with a clean, soft towel and replace the dry dressing. Do not allow the tape to come in contact with the mesh. Do not rub or apply any lotions or ointments to your incision site. Do not soak or scrub your wound. The mesh dressing will be removed during your two week follow-up appointment. If you experience drainage leaking from underneath the mesh or if it peels off before 2 weeks, please contact your orthopedic surgeons office.     Showering  -You may shower in approximately 4 days after your surgery.      -Leave the dressing on during your shower without allowing the water to run over it.    -Reminder- your brace can be removed while showering. Remember to not bend or twist while your brace is off.      -Do not take a tub bath. Preventing blood clots  -You have been given T.E.D. stockings to wear. Continue to wear these for 7 days after your discharge. Put them on in the morning and take them off at night.      -They are used to increase your circulation and prevent blood clots from forming in your legs    Pain management  -take pain medication as prescribed; decrease the amount you use as your pain lessens  -avoid alcoholic beverages while taking pain medication  -avoid NSAIDS (Aleve, Ibuprofen, Aspirin) until your surgeon approves it  -Please be aware that many medications contain Tylenol. We do not want you to over medicate so please read the information below as a guide. Do not take more than 4 Grams of Tylenol in a 24 hour period. (There are 1000 milligrams in one Gram)  Percocet contains 325 mg of Tylenol per tablet (do not take more than 12 tablets in 24 hours)  Lortab contains 500 mg of Tylenol per tablet (do not take more than 8 tablets in 24 hours)  Norco contains 325 mg of Tylenol per tablet (do not take more than 12 tablets in 24 hours). Diet  -resume usual diet; drink plenty of fluids; eat foods high in fiber  -It is important to have regular bowel movements. Pain medications may cause constipation. You may want to take a stool softener (such as Senokot-S or Colace) to prevent constipation. If constipation occurs, take a laxative (such as Dulcolax tablets, Milk of Magnesia, or a suppository).   Laxatives should only be used if the above preventable measures have failed and you still have not had a bowel movement after three days

## 2017-11-10 NOTE — PROGRESS NOTES
ORTHOPAEDIC LUMBAR FUSION PROGRESS NOTE    NAME: Jules Kellogg   :  1975   MRN:  [id number]   DATE:  11/10/2017    POD: 3 Days Post-Op  S/P: Procedure(s):  LUMBAR LAMINECTOMY, POSTERIOR SPINAL FUSION L5-S1    SUBJECTIVE:    Patient found lying in bed this morning, left side complaining of low back soreness at the surgical site as well as the hips. She states the soreness is more pronounced after she has been laying in bed, in one position for a period of time. She continues to deny leg pain/numbness or tingling. She notes she has been working with PT/OT -getting OOB,walking and was able to navigate steps yesterday. She denies nausea/vomiting, chest pain, headache or shortness of breath. Afebrile/VSS. No new complaints. Discussed discharge to home today with the patient, pending continued positive progression, to which she is in agreement with. Recent Labs      17   0615  17   0319   HGB  10.0*  9.9*   NA   --   136   K   --   4.1   CL   --   109*   CO2   --   18*   BUN   --   7   CREA   --   0.61   GLU   --   124*     Patient Vitals for the past 12 hrs:   BP Temp Pulse Resp SpO2   11/10/17 0918 136/84 99.3 °F (37.4 °C) (!) 110 16 100 %   11/10/17 0458 121/80 98.7 °F (37.1 °C) (!) 102 18 99 %     Drain: has been removed    EXAM:  Positive strength/ROM bilat lower ext.   Neuro intact  Dressings clean, dry and intact     PLAN:  Continue PT/OT -OOB, walking  Continue pain management, muscle relaxer  D/c planning to home today, pending continued positive progression.   -Rx: Oxycodone, Robaxin on chart      Elkin Hernandez PA-C

## 2017-11-10 NOTE — PROGRESS NOTES
Bedside and Verbal shift change report given to Pat (oncoming nurse) by Royal Monteiro (offgoing nurse). Report included the following information SBAR, Procedure Summary, Intake/Output, MAR, Recent Results and Med Rec Status.

## 2017-11-21 RX ORDER — ATENOLOL 25 MG/1
TABLET ORAL
Qty: 30 TAB | Refills: 3 | Status: SHIPPED | OUTPATIENT
Start: 2017-11-21 | End: 2018-04-10 | Stop reason: SDUPTHER

## 2017-12-01 RX ORDER — METFORMIN HYDROCHLORIDE 500 MG/1
1000 TABLET, EXTENDED RELEASE ORAL
Qty: 180 TAB | Refills: 3 | Status: SHIPPED | OUTPATIENT
Start: 2017-12-01 | End: 2018-11-26 | Stop reason: SDUPTHER

## 2017-12-10 DIAGNOSIS — E78.5 HYPERLIPIDEMIA LDL GOAL <100: ICD-10-CM

## 2017-12-10 RX ORDER — PRAVASTATIN SODIUM 40 MG/1
TABLET ORAL
Qty: 90 TAB | Refills: 0 | Status: SHIPPED | OUTPATIENT
Start: 2017-12-10 | End: 2018-03-14 | Stop reason: SDUPTHER

## 2017-12-10 RX ORDER — GLIMEPIRIDE 2 MG/1
TABLET ORAL
Qty: 60 TAB | Refills: 3 | Status: SHIPPED | OUTPATIENT
Start: 2017-12-10 | End: 2018-05-01 | Stop reason: SDUPTHER

## 2017-12-14 DIAGNOSIS — K59.00 CONSTIPATION, UNSPECIFIED CONSTIPATION TYPE: ICD-10-CM

## 2017-12-14 DIAGNOSIS — E55.9 VITAMIN D DEFICIENCY: ICD-10-CM

## 2017-12-14 RX ORDER — ACETAMINOPHEN 500 MG
TABLET ORAL
Qty: 30 CAP | Refills: 0 | Status: SHIPPED | OUTPATIENT
Start: 2017-12-14 | End: 2017-12-19 | Stop reason: SDUPTHER

## 2017-12-15 ENCOUNTER — TELEPHONE (OUTPATIENT)
Dept: FAMILY MEDICINE CLINIC | Age: 42
End: 2017-12-15

## 2017-12-16 DIAGNOSIS — K59.01 SLOW TRANSIT CONSTIPATION: ICD-10-CM

## 2017-12-16 RX ORDER — POLYETHYLENE GLYCOL 3350 17 G/17G
17 POWDER, FOR SOLUTION ORAL DAILY
Qty: 30 EACH | Refills: 5 | Status: SHIPPED | OUTPATIENT
Start: 2017-12-16 | End: 2017-12-19 | Stop reason: SDUPTHER

## 2017-12-16 RX ORDER — LINACLOTIDE 145 UG/1
CAPSULE, GELATIN COATED ORAL
Qty: 30 CAP | Refills: 2 | Status: SHIPPED | OUTPATIENT
Start: 2017-12-16 | End: 2017-12-19 | Stop reason: SDUPTHER

## 2017-12-16 RX ORDER — DICLOFENAC SODIUM 75 MG/1
TABLET, DELAYED RELEASE ORAL
Qty: 60 TAB | Refills: 5 | Status: SHIPPED | OUTPATIENT
Start: 2017-12-16 | End: 2017-12-19 | Stop reason: SDUPTHER

## 2017-12-19 ENCOUNTER — OFFICE VISIT (OUTPATIENT)
Dept: FAMILY MEDICINE CLINIC | Age: 42
End: 2017-12-19

## 2017-12-19 VITALS
HEART RATE: 74 BPM | WEIGHT: 243 LBS | SYSTOLIC BLOOD PRESSURE: 109 MMHG | TEMPERATURE: 98.3 F | BODY MASS INDEX: 39.05 KG/M2 | DIASTOLIC BLOOD PRESSURE: 76 MMHG | OXYGEN SATURATION: 98 % | HEIGHT: 66 IN | RESPIRATION RATE: 19 BRPM

## 2017-12-19 DIAGNOSIS — K59.01 SLOW TRANSIT CONSTIPATION: ICD-10-CM

## 2017-12-19 DIAGNOSIS — E11.9 WELL CONTROLLED TYPE 2 DIABETES MELLITUS (HCC): ICD-10-CM

## 2017-12-19 DIAGNOSIS — K59.00 CONSTIPATION, UNSPECIFIED CONSTIPATION TYPE: ICD-10-CM

## 2017-12-19 DIAGNOSIS — M54.50 LUMBAR SPINE PAIN: Primary | ICD-10-CM

## 2017-12-19 DIAGNOSIS — Z13.0 SCREENING FOR IRON DEFICIENCY ANEMIA: ICD-10-CM

## 2017-12-19 DIAGNOSIS — E66.9 OBESITY (BMI 30-39.9): ICD-10-CM

## 2017-12-19 DIAGNOSIS — E55.9 VITAMIN D DEFICIENCY: ICD-10-CM

## 2017-12-19 RX ORDER — POLYETHYLENE GLYCOL 3350 17 G/17G
17 POWDER, FOR SOLUTION ORAL DAILY
Qty: 30 EACH | Refills: 5 | Status: SHIPPED | OUTPATIENT
Start: 2017-12-19 | End: 2019-07-29 | Stop reason: SDUPTHER

## 2017-12-19 RX ORDER — CYCLOBENZAPRINE HCL 10 MG
10 TABLET ORAL 2 TIMES DAILY
COMMUNITY
Start: 2017-12-17 | End: 2019-11-18

## 2017-12-19 RX ORDER — DICLOFENAC SODIUM 75 MG/1
TABLET, DELAYED RELEASE ORAL
Qty: 60 TAB | Refills: 5 | Status: SHIPPED | OUTPATIENT
Start: 2017-12-19 | End: 2019-02-25 | Stop reason: ALTCHOICE

## 2017-12-19 RX ORDER — INSULIN GLARGINE 100 [IU]/ML
INJECTION, SOLUTION SUBCUTANEOUS
Refills: 3 | COMMUNITY
Start: 2017-12-10 | End: 2018-02-01 | Stop reason: SDUPTHER

## 2017-12-19 RX ORDER — TOPIRAMATE 25 MG/1
TABLET ORAL
Qty: 60 TAB | Refills: 5 | Status: SHIPPED | OUTPATIENT
Start: 2017-12-19 | End: 2018-05-01 | Stop reason: SDUPTHER

## 2017-12-19 RX ORDER — ACETAMINOPHEN 500 MG
TABLET ORAL
Qty: 30 CAP | Refills: 5 | Status: SHIPPED | OUTPATIENT
Start: 2017-12-19 | End: 2018-09-26 | Stop reason: SDUPTHER

## 2017-12-19 RX ORDER — POTASSIUM CHLORIDE 1500 MG/1
TABLET, FILM COATED, EXTENDED RELEASE ORAL
Refills: 5 | COMMUNITY
Start: 2017-12-04 | End: 2017-12-19 | Stop reason: SDUPTHER

## 2017-12-19 NOTE — MR AVS SNAPSHOT
Visit Information Date & Time Provider Department Dept. Phone Encounter #  
 12/19/2017 10:30 AM Simone Chin MD [address] [place] [address] [id number] Upcoming Health Maintenance Date Due  
 EYE EXAM RETINAL OR DILATED Q1 9/27/1987 PAP AKA CERVICAL CYTOLOGY 9/27/1998 Influenza Age 5 to Adult 8/1/2017 LIPID PANEL Q1 11/11/2017 HEMOGLOBIN A1C Q6M 4/4/2018 FOOT EXAM Q1 6/9/2018 MICROALBUMIN Q1 6/9/2018 MEDICARE YEARLY EXAM 6/10/2018 DTaP/Tdap/Td series (2 - Td) 3/10/2024 Allergies as of 12/19/2017  Review Complete On: 12/19/2017 By: Simone Chin MD  
  
 Severity Noted Reaction Type Reactions Pcn [Penicillins] High 05/03/2010    Swelling Swelling throat Shellfish Containing Products High 03/06/2011    Swelling Swells throat and eyes Morphine  10/04/2017    Other (comments) FAST HEARTBEAT AND NAUSEA PER PATIENT Current Immunizations  Reviewed on 5/26/2016 Name Date Tdap 3/10/2014  5:21 PM  
  
 Not reviewed this visit You Were Diagnosed With   
  
 Codes Comments Lumbar spine pain    -  Primary ICD-10-CM: M54.5 ICD-9-CM: 724.2 Constipation, unspecified constipation type     ICD-10-CM: K59.00 ICD-9-CM: 564.00 Slow transit constipation     ICD-10-CM: K59.01 
ICD-9-CM: 564.01 Vitamin D deficiency     ICD-10-CM: E55.9 ICD-9-CM: 268.9 Obesity (BMI 30-39. 9)     ICD-10-CM: E66.9 ICD-9-CM: 278.00 Well controlled type 2 diabetes mellitus ([facility] [address]     ICD-10-CM: E11.9 ICD-9-CM: 250.00 Screening for iron deficiency anemia     ICD-10-CM: Z13.0 ICD-9-CM: V78.0 Vitals BP Pulse Temp Resp Height(growth percentile) Weight(growth percentile) 109/76 74 98.3 °F (36.8 °C) (Oral) 19 5' 6\" (1.676 m) 243 lb (110.2 kg) SpO2 BMI OB Status Smoking Status 98% 39.22 kg/m2 Hysterectomy Never Smoker Vitals History BMI and BSA Data Body Mass Index Body Surface Area 39.22 kg/m 2 2.27 m 2 Preferred Pharmacy Pharmacy Name Phone [id number]- [place], [place] - [address] [phone] Your Updated Medication List  
  
   
This list is accurate as of: 12/19/17 11:30 AM.  Always use your most recent med list.  
  
  
  
  
 atenolol 25 mg tablet Commonly known as:  TENORMIN  
take 1 tablet by mouth once daily Cholecalciferol (Vitamin D3) 2,000 unit Cap capsule Commonly known as:  VITAMIN D3  
TAKE ONE CAPSULE BY MOUTH EVERY DAY  
  
 cyclobenzaprine 10 mg tablet Commonly known as:  FLEXERIL Take 10 mg by mouth two (2) times a day. diclofenac EC 75 mg EC tablet Commonly known as:  VOLTAREN  
TAKE 1 TABLET BY MOUTH TWICE A DAY  
  
 furosemide 40 mg tablet Commonly known as:  LASIX  
take 1 tablet by mouth once daily  
  
 gabapentin 400 mg capsule Commonly known as:  NEURONTIN Take 1 Cap by mouth two (2) times a day. glimepiride 2 mg tablet Commonly known as:  AMARYL  
TAKE 1 TAB BY MOUTH TWO (2) TIMES A DAY. LANTUS SOLOSTAR 100 unit/mL (3 mL) Inpn Generic drug:  insulin glargine INJECT 30 UNITS BY SUBCUTANEOUS ROUTE NIGHTLY. linaclotide 145 mcg Cap capsule Commonly known as:  Alto Longs Take 1 Cap by mouth Daily (before breakfast). metFORMIN  mg tablet Commonly known as:  GLUCOPHAGE XR Take 2 Tabs by mouth daily (with dinner). methocarbamol 750 mg tablet Commonly known as:  ROBAXIN Take 1 Tab by mouth three (3) times daily. montelukast 10 mg tablet Commonly known as:  SINGULAIR  
take 1 tablet by mouth once daily  
  
 polyethylene glycol 17 gram packet Commonly known as:  Beola [place] Take 1 Packet by mouth daily. potassium chloride 20 mEq tablet Commonly known as:  K-DUR, KLOR-CON  
take 1 tablet by mouth once daily  
  
 pravastatin 40 mg tablet Commonly known as:  PRAVACHOL  
TAKE 1 TABLET BY MOUTH EVERY EVENING  
 topiramate 25 mg tablet Commonly known as:  TOPAMAX TAKE 1 TABLET TWICE A DAY WITH MEALS  
  
 traZODone 100 mg tablet Commonly known as:  DESYREL  
take 1 tablet by mouth at bedtime Prescriptions Sent to Pharmacy Refills  
 linaclotide (LINZESS) 145 mcg cap capsule 2 Sig: Take 1 Cap by mouth Daily (before breakfast). Class: Normal  
 Pharmacy: [facility]/pharmacy [address] [place] [address] [address] Ph #: [phone] Route: Oral  
 polyethylene glycol (MIRALAX) 17 gram packet 5 Sig: Take 1 Packet by mouth daily. Class: Normal  
 Pharmacy: [facility]/pharmacy [address] [place] [address] [address] Ph #: [phone] Route: Oral  
 diclofenac EC (VOLTAREN) 75 mg EC tablet 5 Sig: TAKE 1 TABLET BY MOUTH TWICE A DAY Class: Normal  
 Pharmacy: [facility]/pharmacy [address] [place] [address] [address] Ph #: [phone] Cholecalciferol, Vitamin D3, (VITAMIN D3) 2,000 unit cap capsule 5 Sig: TAKE ONE CAPSULE BY MOUTH EVERY DAY Class: Normal  
 Pharmacy: [facility]/pharmacy [address] [place] [address] [address] Ph #: [phone]  
 topiramate (TOPAMAX) 25 mg tablet 5 Sig: TAKE 1 TABLET TWICE A DAY WITH MEALS Class: Normal  
 Pharmacy: [facility]/[address] Ph #: [phone] We Performed the Following CBC WITH AUTOMATED DIFF [14565 CPT(R)] HEMOGLOBIN A1C WITH EAG [53736 CPT(R)] METABOLIC PANEL, BASIC [37411 CPT(R)] REFERRAL TO PHYSICAL THERAPY [NMK91 Custom] Comments: S/p spinal surgery, wearing a body brace. Needs PT to keep the core strengthened as she will come out of the brace soon. eval and treat Referral Information Referral ID Referred By Referred To  
  
 [id number] Jamaal Hewitt Not Available Visits Status Start Date End Date 1 New Request 12/19/17 12/19/18 If your referral has a status of pending review or denied, additional information will be sent to support the outcome of this decision. Introducing [facility] SERVICES! Jelena Fowler introduces [employer] patient portal. Now you can access parts of your medical record, email your doctor's office, and request medication refills online. 1. In your internet browser, go to [email or web address] 2. Click on the First Time User? Click Here link in the Sign In box. You will see the New Member Sign Up page. 3. Enter your [employer] Access Code exactly as it appears below. You will not need to use this code after youve completed the sign-up process. If you do not sign up before the expiration date, you must request a new code. · [employer] Access Code: [email or web address] 
Expires: 3/19/2018 11:30 AM 
 
4. Enter the last four digits of your Social Security Number (xxxx) and Date of Birth (mm/dd/yyyy) as indicated and click Submit. You will be taken to the next sign-up page. 5. Create a [employer] ID. This will be your [employer] login ID and cannot be changed, so think of one that is secure and easy to remember. 6. Create a [employer] password. You can change your password at any time. 7. Enter your Password Reset Question and Answer. This can be used at a later time if you forget your password. 8. Enter your e-mail address. You will receive e-mail notification when new information is available in [address] 9. Click Sign Up. You can now view and download portions of your medical record. 10. Click the Download Summary menu link to download a portable copy of your medical information. If you have questions, please visit the Frequently Asked Questions section of the [employer] website. Remember, [employer] is NOT to be used for urgent needs. For medical emergencies, dial 911. Now available from your iPhone and Android! Please provide this summary of care documentation to your next provider. Your primary care clinician is listed as Chan Carroll. If you have any questions after today's visit, please call [phone].

## 2017-12-19 NOTE — PROGRESS NOTES
Chief Complaint   Patient presents with    Follow-up     she is a 38y.o. year old female who presents for evalution. She is still requiring narcotic pain meds. Ortho is trying to wean her off now. Of hydrocodone  She is not getting pt or OT. She got one day of home PT/OT    She is driving, wearing a body brace today  She is to wear that brace 3 months total  Seems to be doing well    Reviewed PmHx, RxHx, FmHx, SocHx, AllgHx and updated and dated in the chart.     Aspirin yes ____   No____ N/A____    Patient Active Problem List    Diagnosis    Fibromyalgia    DDD (degenerative disc disease), lumbar    Well controlled type 2 diabetes mellitus ([address]    Cervical stenosis of spine    Chronic neck pain    SONY (obstructive sleep apnea)    DDD (degenerative disc disease), cervical     followed by ortho      Arthritis     lower back      Asthma     last attack 1994      Bipolar 1 disorder ([address]    GERD (gastroesophageal reflux disease)    High cholesterol    HTN, goal below 140/90    Migraines    Morbid obesity ([address]    Unspecified sleep apnea     no cpap-NEVER TESTED      Diabetes mellitus type 2, controlled ([facility]     a1c 7.4% at outside EMR         Nurse notes were reviewed and copied and are correct  Review of Systems - negative except as listed above in the HPI    Objective:     Vitals:    12/19/17 1042   BP: 109/76   Pulse: 74   Resp: 19   Temp: 98.3 °F (36.8 °C)   TempSrc: Oral   SpO2: 98%   Weight: 243 lb (110.2 kg)   Height: 5' 6\" (1.676 m)       Physical Examination: General appearance - alert, well appearing, and in no distress  Mental status - alert, oriented to person, place, and time  Chest - clear to auscultation, no wheezes, rales or rhonchi, symmetric air entry  Heart - normal rate, regular rhythm, normal S1, S2, no murmurs, rubs, clicks or gallops  Extremities - peripheral pulses normal, no pedal edema, no clubbing or cyanosis      Assessment/ Plan:   Diagnoses and all orders for this visit: 1. Lumbar spine pain  -     REFERRAL TO PHYSICAL THERAPY    2. Constipation, unspecified constipation type  -     linaclotide (LINZESS) 145 mcg cap capsule; Take 1 Cap by mouth Daily (before breakfast). 3. Slow transit constipation  -     polyethylene glycol (MIRALAX) 17 gram packet; Take 1 Packet by mouth daily. 4. Vitamin D deficiency  -     Cholecalciferol, Vitamin D3, (VITAMIN D3) 2,000 unit cap capsule; TAKE ONE CAPSULE BY MOUTH EVERY DAY    5. Obesity (BMI 30-39.9)  -     topiramate (TOPAMAX) 25 mg tablet; TAKE 1 TABLET TWICE A DAY WITH MEALS  Move more with the PT. avois sweets and junk food    6. Well controlled type 2 diabetes mellitus ([facility]  -     HEMOGLOBIN A1C WITH EAG  -     METABOLIC PANEL, BASIC  Was off of metformin for a while. Also BG is important for healing the incision and bones in general  7. Screening for iron deficiency anemia  -     CBC WITH AUTOMATED DIFF  Post operatibve check on blood count  Other orders  -     diclofenac EC (VOLTAREN) 75 mg EC tablet; TAKE 1 TABLET BY MOUTH TWICE A DAY       Follow-up Disposition:  Return in about 3 months (around 3/19/2018), or if symptoms worsen or fail to improve. ICD-10-CM ICD-9-CM    1. Lumbar spine pain M54.5 724.2 REFERRAL TO PHYSICAL THERAPY   2. Constipation, unspecified constipation type K59.00 564.00 linaclotide (LINZESS) 145 mcg cap capsule   3. Slow transit constipation K59.01 564.01 polyethylene glycol (MIRALAX) 17 gram packet   4. Vitamin D deficiency E55.9 268.9 Cholecalciferol, Vitamin D3, (VITAMIN D3) 2,000 unit cap capsule   5. Obesity (BMI 30-39. 9) E66.9 278.00 topiramate (TOPAMAX) 25 mg tablet   6. Well controlled type 2 diabetes mellitus ([address] E11.9 250.00 HEMOGLOBIN A1C WITH EAG      METABOLIC PANEL, BASIC   7. Screening for iron deficiency anemia Z13.0 V78.0 CBC WITH AUTOMATED DIFF       I have discussed the diagnosis with the patient and the intended plan as seen in the above orders.   The patient has received an after-visit summary and questions were answered concerning future plans. Medication Side Effects and Warnings were discussed with patient: yes  Patient Labs were reviewed and or requested: yes  Patient Past Records were reviewed and or requested: yes        There are no Patient Instructions on file for this visit.     The patient verbalizes understanding and agrees with the plan of care        Patient has the advanced directives booklet to review

## 2017-12-19 NOTE — PROGRESS NOTES
1. Have you been to the ER, urgent care clinic since your last visit? Hospitalized since your last visit? No    2. Have you seen or consulted any other health care providers outside of the [address] [place] [address] since your last visit? Include any pap smears or colon screening.  No       Chief Complaint   Patient presents with    Follow-up

## 2017-12-20 LAB
BASOPHILS # BLD AUTO: 0 X10E3/UL (ref 0–0.2)
BASOPHILS NFR BLD AUTO: 0 %
BUN SERPL-MCNC: 11 MG/DL (ref 6–24)
BUN/CREAT SERPL: 16 (ref 9–23)
CALCIUM SERPL-MCNC: 8.9 MG/DL (ref 8.7–10.2)
CHLORIDE SERPL-SCNC: 104 MMOL/L (ref 96–106)
CO2 SERPL-SCNC: 21 MMOL/L (ref 18–29)
CREAT SERPL-MCNC: 0.7 MG/DL (ref 0.57–1)
EOSINOPHIL # BLD AUTO: 0 X10E3/UL (ref 0–0.4)
EOSINOPHIL NFR BLD AUTO: 1 %
ERYTHROCYTE [DISTWIDTH] IN BLOOD BY AUTOMATED COUNT: 14.2 % (ref 12.3–15.4)
EST. AVERAGE GLUCOSE BLD GHB EST-MCNC: 114 MG/DL
GLUCOSE SERPL-MCNC: 71 MG/DL (ref 65–99)
HBA1C MFR BLD: 5.6 % (ref 4.8–5.6)
HCT VFR BLD AUTO: 32.7 % (ref 34–46.6)
HGB BLD-MCNC: 10.9 G/DL (ref 11.1–15.9)
IMM GRANULOCYTES # BLD: 0 X10E3/UL (ref 0–0.1)
IMM GRANULOCYTES NFR BLD: 0 %
LYMPHOCYTES # BLD AUTO: 2 X10E3/UL (ref 0.7–3.1)
LYMPHOCYTES NFR BLD AUTO: 35 %
MCH RBC QN AUTO: 28.8 PG (ref 26.6–33)
MCHC RBC AUTO-ENTMCNC: 33.3 G/DL (ref 31.5–35.7)
MCV RBC AUTO: 86 FL (ref 79–97)
MONOCYTES # BLD AUTO: 0.4 X10E3/UL (ref 0.1–0.9)
MONOCYTES NFR BLD AUTO: 6 %
NEUTROPHILS # BLD AUTO: 3.3 X10E3/UL (ref 1.4–7)
NEUTROPHILS NFR BLD AUTO: 58 %
PLATELET # BLD AUTO: 278 X10E3/UL (ref 150–379)
POTASSIUM SERPL-SCNC: 4.1 MMOL/L (ref 3.5–5.2)
RBC # BLD AUTO: 3.79 X10E6/UL (ref 3.77–5.28)
SODIUM SERPL-SCNC: 139 MMOL/L (ref 134–144)
WBC # BLD AUTO: 5.7 X10E3/UL (ref 3.4–10.8)

## 2017-12-28 NOTE — PROGRESS NOTES
The blood sugar test was normal  The kidney test was normal  The hemoglobin is better this time than it was a month ago

## 2018-02-13 ENCOUNTER — DOCUMENTATION ONLY (OUTPATIENT)
Dept: FAMILY MEDICINE CLINIC | Age: 43
End: 2018-02-13

## 2018-02-13 NOTE — PROGRESS NOTES
PA approved for Rm [employer]. Coverage begins 1/1/2018-2/13/2019. Pt. Pharmacy notified of approval on 2/13/18.

## 2018-02-22 DIAGNOSIS — Z79.4 CONTROLLED TYPE 2 DIABETES MELLITUS WITHOUT COMPLICATION, WITH LONG-TERM CURRENT USE OF INSULIN (HCC): ICD-10-CM

## 2018-02-22 DIAGNOSIS — E11.9 CONTROLLED TYPE 2 DIABETES MELLITUS WITHOUT COMPLICATION, WITH LONG-TERM CURRENT USE OF INSULIN (HCC): ICD-10-CM

## 2018-02-26 RX ORDER — INSULIN GLARGINE 100 [IU]/ML
INJECTION, SOLUTION SUBCUTANEOUS
Qty: 3 ADJUSTABLE DOSE PRE-FILLED PEN SYRINGE | Refills: 3 | Status: SHIPPED | OUTPATIENT
Start: 2018-02-26 | End: 2018-07-24 | Stop reason: SDUPTHER

## 2018-03-14 DIAGNOSIS — E78.5 HYPERLIPIDEMIA LDL GOAL <100: ICD-10-CM

## 2018-03-19 RX ORDER — PRAVASTATIN SODIUM 40 MG/1
TABLET ORAL
Qty: 90 TAB | Refills: 0 | Status: SHIPPED | OUTPATIENT
Start: 2018-03-19 | End: 2018-06-14 | Stop reason: SDUPTHER

## 2018-03-19 NOTE — TELEPHONE ENCOUNTER
Unable to reach patient at numbers on file. Left message with pt pharmacy to notify that OV/lab are required.

## 2018-04-10 RX ORDER — ATENOLOL 25 MG/1
TABLET ORAL
Qty: 30 TAB | Refills: 3 | Status: SHIPPED | OUTPATIENT
Start: 2018-04-10 | End: 2018-08-25 | Stop reason: SDUPTHER

## 2018-04-24 DIAGNOSIS — E55.9 VITAMIN D DEFICIENCY: ICD-10-CM

## 2018-04-24 RX ORDER — ACETAMINOPHEN 500 MG
TABLET ORAL
Qty: 30 CAP | Refills: 5 | Status: CANCELLED | OUTPATIENT
Start: 2018-04-24

## 2018-04-25 RX ORDER — POTASSIUM CHLORIDE 1500 MG/1
TABLET, FILM COATED, EXTENDED RELEASE ORAL
Qty: 30 TAB | Refills: 5 | Status: SHIPPED | OUTPATIENT
Start: 2018-04-25 | End: 2018-11-26 | Stop reason: SDUPTHER

## 2018-05-01 ENCOUNTER — OFFICE VISIT (OUTPATIENT)
Dept: FAMILY MEDICINE CLINIC | Age: 43
End: 2018-05-01

## 2018-05-01 VITALS
OXYGEN SATURATION: 96 % | DIASTOLIC BLOOD PRESSURE: 75 MMHG | HEART RATE: 86 BPM | BODY MASS INDEX: 39.97 KG/M2 | TEMPERATURE: 98.8 F | WEIGHT: 248.7 LBS | RESPIRATION RATE: 20 BRPM | HEIGHT: 66 IN | SYSTOLIC BLOOD PRESSURE: 110 MMHG

## 2018-05-01 DIAGNOSIS — E66.9 OBESITY (BMI 30-39.9): ICD-10-CM

## 2018-05-01 DIAGNOSIS — I10 HTN, GOAL BELOW 140/90: ICD-10-CM

## 2018-05-01 DIAGNOSIS — E11.9 WELL CONTROLLED TYPE 2 DIABETES MELLITUS (HCC): Primary | ICD-10-CM

## 2018-05-01 DIAGNOSIS — E78.00 HIGH CHOLESTEROL: ICD-10-CM

## 2018-05-01 RX ORDER — TRAZODONE HYDROCHLORIDE 100 MG/1
TABLET ORAL
Qty: 90 TAB | Refills: 3 | Status: SHIPPED | OUTPATIENT
Start: 2018-05-01 | End: 2019-07-23 | Stop reason: SDUPTHER

## 2018-05-01 RX ORDER — GLIMEPIRIDE 2 MG/1
TABLET ORAL
Qty: 180 TAB | Refills: 3 | Status: SHIPPED | OUTPATIENT
Start: 2018-05-01 | End: 2019-04-28 | Stop reason: SDUPTHER

## 2018-05-01 RX ORDER — PHENTERMINE HYDROCHLORIDE 37.5 MG/1
37.5 TABLET ORAL
Qty: 30 TAB | Refills: 0 | Status: SHIPPED | OUTPATIENT
Start: 2018-05-01 | End: 2018-11-14

## 2018-05-01 RX ORDER — TOPIRAMATE 25 MG/1
TABLET ORAL
Qty: 180 TAB | Refills: 3 | Status: SHIPPED | OUTPATIENT
Start: 2018-05-01 | End: 2018-11-19 | Stop reason: SDUPTHER

## 2018-05-01 NOTE — PROGRESS NOTES
Weight Loss Progress Note: follow up Physician Visit      Betty Canales is a 38 y.o. female with BMI ,40.14     who is here for her follow up  Evaluation for the medical bariatric care. CC:I want to be healthier  She walks her dog 2 blocks and comes back once a day  And then at work she walks and amirah and bends all day doing events  She is skipping bfast and doing 1-2 meals a day  She is drinking sweet juices  She lost her home so she moved in with her mother          Weight History  Current weight  248 and BMI is Body mass index is 40.14 kg/(m^2). Goal weight  BMI 29  Highest weight 248  (See weight gain time line scanned into media section of chart)        Significant Medical History    Update on sleep apnea and  CPAP     Ever had bariatric surgery: no    Pregnant or planning on becoming pregnant w/in 6 months: no         Significant Psychosocial History   Any history of drug abuse or dependence: no  Current Major Lifestyle Changes: no  Any potential unsupportive people: no          Social History  Social History   Substance Use Topics    Smoking status: Never Smoker    Smokeless tobacco: Never Used    Alcohol use Yes      Comment: SOCIAL      How many times a week do you eat out?  0    Do you drink any EtOH?  no   If so, how much? Do you have upcoming any travel in the next 6 weeks?  no   If so, what do you have planned?           Exercise  How many days a week do you currently exercise?  0 days  Have you ever been told by a physician not to exercise?  no      Objective  Visit Vitals    /75    Pulse 86    Temp 98.8 °F (37.1 °C) (Oral)    Resp 20    Ht 5' 6\" (1.676 m)    Wt 248 lb 11.2 oz (112.8 kg)    SpO2 96%    BMI 40.14 kg/m2       Bicep - (right ) circumference  Waist Circumference: See Weight Management Doc Flowsheet  Neck Circumference: See Weight Management Doc Flowsheet  Percent Body Fat: See Weight Management Doc Flowsheet  Weight Metrics 5/1/2018 5/1/2018 12/19/2017 11/7/2017 10/9/2017 10/4/2017 9/11/2017   Weight - 248 lb 11.2 oz 243 lb 234 lb 236 lb 9.6 oz 241 lb 8 oz -   Neck Circ (inches) 16 - - - - - -   Waist Measure Inches 46.5 - - - - - 47.5   Exercise Mins/week - - - - - - -   Body Fat % - - - - - - 42.1   BMI - 40.14 kg/m2 39.22 kg/m2 37.77 kg/m2 38.19 kg/m2 38.98 kg/m2 -         Labs:     Review of Systems  Complete ROS negative except where noted above      Physical Exam    Vital Signs Reviewed  Weight Management Metrics Reviewed    Vital Signs Reviewed  Appearance: Obese, A&O x 3, NAD  HEENT:  NC/AT, EOMI, PERRL, No scleral icterus, malampatti score:4  Skin:    Skin tags - no   Acanthosis Nigricans - no  Neck:  No nodes, thyromegaly   Heart:  RRR without M/R/G  Lungs:  CTAB, no rhonchi, rales, or wheezes with good air exchange   Abdomen:  Non-tender, pos bowel sounds; hepatomegaly -   Ext:  No C/C/E        Assessment & Plan  Diagnoses and all orders for this visit:    1. Well controlled type 2 diabetes mellitus ([address]  -     glimepiride (AMARYL) 2 mg tablet; TAKE 1 TAB BY MOUTH TWO (2) TIMES A DAY.  -     HEMOGLOBIN A1C WITH EAG    2. Obesity (BMI 30-39.9)  -     topiramate (TOPAMAX) 25 mg tablet; TAKE 1 TABLET TWICE A DAY WITH MEALS  -     phentermine (ADIPEX-P) 37.5 mg tablet; Take 1 Tab by mouth every morning. Max Daily Amount: 37.5 mg.  Diet: day 1-4    Activity: 5 days a week    Medication:phentermine 37.5 mg daily  3. High cholesterol  -     METABOLIC PANEL, COMPREHENSIVE  -     LIPID PANEL    4. HTN, goal below 790/47  -     METABOLIC PANEL, COMPREHENSIVE    Other orders  -     traZODone (DESYREL) 100 mg tablet; take 1 tablet by mouth at bedtime        Based on his history and exam, Belkys Russo is a good candidate for the  [facility]         There are no Patient Instructions on file for this visit. Follow-up Disposition:  Return in about 2 weeks (around 5/15/2018).     Over 50% of the 30 minutes face to face time with Kel consisted of counseling & coordinating and/or discussing treatment plans in reference to her obesity The primary encounter diagnosis was Well controlled type 2 diabetes mellitus ([facility] Diagnoses of Obesity (BMI 30-39.9), High cholesterol, and HTN, goal below 140/90 were also pertinent to this visit.   The patient verbalizes understanding and agrees with the plan of care    Patient has the advanced directives  booklet to review

## 2018-05-01 NOTE — PROGRESS NOTES
1. Have you been to the ER, urgent care clinic since your last visit? Hospitalized since your last visit? [facility]y finger [facility]    2. Have you seen or consulted any other health care providers outside of the [facility] since your last visit? Include any pap smears or colon screening. Ortho    Chief Complaint   Patient presents with    Weight Management     Body Weight: 248. 7  Body Fat%: 43.6  Muscle Mass Weight: 40.1  Body Water Weight: 92.1  Basal Metabolic Rate: 3112  BMI: 40.14

## 2018-05-01 NOTE — MR AVS SNAPSHOT
[address] [facility] [address] 
[phone] Patient: Betty Lung MRN: [id number] Visit Information Date & Time Provider Department Dept. Phone Encounter #  
 2018  1:15 PM Homa Presley MD [address] [place] [address] [id number] Upcoming Health Maintenance Date Due  
 EYE EXAM RETINAL OR DILATED Q1 1987 PAP AKA CERVICAL CYTOLOGY 1998 LIPID PANEL Q1 2017 FOOT EXAM Q1 2018 MICROALBUMIN Q1 2018 MEDICARE YEARLY EXAM 6/10/2018 HEMOGLOBIN A1C Q6M 2018 Influenza Age 5 to Adult 2018 DTaP/Tdap/Td series (2 - Td) 3/10/2024 Allergies as of 2018  Review Complete On: 2018 By: Homa Presley MD  
  
 Severity Noted Reaction Type Reactions Pcn [Penicillins] High 2010    Swelling Swelling throat Shellfish Containing Products High 2011    Swelling Swells throat and eyes Morphine  10/04/2017    Other (comments) FAST HEARTBEAT AND NAUSEA PER PATIENT Current Immunizations  Reviewed on 2016 Name Date Tdap 3/10/2014  5:21 PM  
  
 Not reviewed this visit You Were Diagnosed With   
  
 Codes Comments Well controlled type 2 diabetes mellitus ([facility]    -  Primary ICD-10-CM: E11.9 ICD-9-CM: 250.00 Obesity (BMI 30-39. 9)     ICD-10-CM: E66.9 ICD-9-CM: 278.00 High cholesterol     ICD-10-CM: E78.00 ICD-9-CM: 272.0   
 HTN, goal below 140/90     ICD-10-CM: I10 
ICD-9-CM: 401.9 Vitals BP Pulse Temp Resp Height(growth percentile) Weight(growth percentile) 110/75 86 98.8 °F (37.1 °C) (Oral) 20 5' 6\" (1.676 m) 248 lb 11.2 oz (112.8 kg) SpO2 BMI OB Status Smoking Status 96% 40.14 kg/m2 Hysterectomy Never Smoker Vitals History BMI and BSA Data Body Mass Index Body Surface Area  
 40.14 kg/m 2 2.29 m 2 Preferred Pharmacy Pharmacy Name Phone [id number]- [place], [place] - [address] [phone] Your Updated Medication List  
  
   
This list is accurate as of 5/1/18  2:27 PM.  Always use your most recent med list.  
  
  
  
  
 atenolol 25 mg tablet Commonly known as:  TENORMIN  
TAKE 1 TABLET BY MOUTH ONCE DAILY Cholecalciferol (Vitamin D3) 2,000 unit Cap capsule Commonly known as:  VITAMIN D3  
TAKE ONE CAPSULE BY MOUTH EVERY DAY  
  
 cyclobenzaprine 10 mg tablet Commonly known as:  FLEXERIL Take 10 mg by mouth two (2) times a day. diclofenac EC 75 mg EC tablet Commonly known as:  VOLTAREN  
TAKE 1 TABLET BY MOUTH TWICE A DAY  
  
 furosemide 40 mg tablet Commonly known as:  LASIX  
take 1 tablet by mouth once daily  
  
 gabapentin 400 mg capsule Commonly known as:  NEURONTIN Take 1 Cap by mouth two (2) times a day. glimepiride 2 mg tablet Commonly known as:  AMARYL  
TAKE 1 TAB BY MOUTH TWO (2) TIMES A DAY. LANTUS SOLOSTAR U-100 INSULIN 100 unit/mL (3 mL) Inpn Generic drug:  insulin glargine INJECT 30 UNITS BY SUBCUTANEOUS ROUTE NIGHTLY. linaclotide 145 mcg Cap capsule Commonly known as:  Rubye Aldo Take 1 Cap by mouth Daily (before breakfast). metFORMIN  mg tablet Commonly known as:  GLUCOPHAGE XR Take 2 Tabs by mouth daily (with dinner). methocarbamol 750 mg tablet Commonly known as:  ROBAXIN Take 1 Tab by mouth three (3) times daily. montelukast 10 mg tablet Commonly known as:  SINGULAIR  
take 1 tablet by mouth once daily  
  
 phentermine 37.5 mg tablet Commonly known as:  ADIPEX-P Take 1 Tab by mouth every morning. Max Daily Amount: 37.5 mg.  
  
 polyethylene glycol 17 gram packet Commonly known as:  Oralee Donath Take 1 Packet by mouth daily. potassium chloride SR 20 mEq tablet Commonly known as:  K-TAB  
TAKE 1 TABLET BY MOUTH ONCE DAILY pravastatin 40 mg tablet Commonly known as:  PRAVACHOL  
TAKE 1 TABLET BY MOUTH EVERY EVENING  
  
 topiramate 25 mg tablet Commonly known as:  TOPAMAX TAKE 1 TABLET TWICE A DAY WITH MEALS  
  
 traZODone 100 mg tablet Commonly known as:  DESYREL  
take 1 tablet by mouth at bedtime Prescriptions Printed Refills  
 phentermine (ADIPEX-P) 37.5 mg tablet 0 Sig: Take 1 Tab by mouth every morning. Max Daily Amount: 37.5 mg.  
 Class: Print Route: Oral  
  
Prescriptions Sent to Pharmacy Refills  
 traZODone (DESYREL) 100 mg tablet 3 Sig: take 1 tablet by mouth at bedtime Class: Normal  
 Pharmacy: [facility] [address] [facility] [address] Ph #: [phone]  
 glimepiride (AMARYL) 2 mg tablet 3 Sig: TAKE 1 TAB BY MOUTH TWO (2) TIMES A DAY. Class: Normal  
 Pharmacy: [facility] [address] [facility] [address] Ph #: [phone]  
 topiramate (TOPAMAX) 25 mg tablet 3 Sig: TAKE 1 TABLET TWICE A DAY WITH MEALS Class: Normal  
 Pharmacy: [facility]/pharmacy [address] [facility] [address] Ph #: [phone] We Performed the Following HEMOGLOBIN A1C WITH EAG [46880 CPT(R)] LIPID PANEL [41461 CPT(R)] METABOLIC PANEL, COMPREHENSIVE [41706 CPT(R)] Introducing [facility] [place] [facility]! [facility] introduces [employer] patient portal. Now you can access parts of your medical record, email your doctor's office, and request medication refills online. 1. In your internet browser, go to [email or web address] 2. Click on the First Time User? Click Here link in the Sign In box. You will see the New Member Sign Up page. 3. Enter your [employer] Access Code exactly as it appears below. You will not need to use this code after youve completed the sign-up process.  If you do not sign up before the expiration date, you must request a new code. · [employer] Access Code: [email or web address] Expires: 7/30/2018  2:27 PM 
 
4. Enter the last four digits of your Social Security Number (xxxx) and Date of Birth (mm/dd/yyyy) as indicated and click Submit. You will be taken to the next sign-up page. 5. Create a [employer] ID. This will be your [employer] login ID and cannot be changed, so think of one that is secure and easy to remember. 6. Create a [employer] password. You can change your password at any time. 7. Enter your Password Reset Question and Answer. This can be used at a later time if you forget your password. 8. Enter your e-mail address. You will receive e-mail notification when new information is available in [address]. 9. Click Sign Up. You can now view and download portions of your medical record. 10. Click the Download Summary menu link to download a portable copy of your medical information. If you have questions, please visit the Frequently Asked Questions section of the [employer] website. Remember, [employer] is NOT to be used for urgent needs. For medical emergencies, dial 911. Now available from your iPhone and Android! Please provide this summary of care documentation to your next provider. Your primary care clinician is listed as Satinder Bishop. If you have any questions after today's visit, please call [phone].

## 2018-07-17 DIAGNOSIS — E11.9 CONTROLLED TYPE 2 DIABETES MELLITUS WITHOUT COMPLICATION, WITH LONG-TERM CURRENT USE OF INSULIN (HCC): ICD-10-CM

## 2018-07-17 DIAGNOSIS — Z79.4 CONTROLLED TYPE 2 DIABETES MELLITUS WITHOUT COMPLICATION, WITH LONG-TERM CURRENT USE OF INSULIN (HCC): ICD-10-CM

## 2018-07-22 DIAGNOSIS — K59.00 CONSTIPATION, UNSPECIFIED CONSTIPATION TYPE: ICD-10-CM

## 2018-07-24 RX ORDER — LINACLOTIDE 145 UG/1
CAPSULE, GELATIN COATED ORAL
Qty: 30 CAP | Refills: 2 | Status: SHIPPED | OUTPATIENT
Start: 2018-07-24 | End: 2019-05-10 | Stop reason: SDUPTHER

## 2018-07-24 RX ORDER — INSULIN GLARGINE 100 [IU]/ML
INJECTION, SOLUTION SUBCUTANEOUS
Qty: 3 ADJUSTABLE DOSE PRE-FILLED PEN SYRINGE | Refills: 3 | Status: SHIPPED | OUTPATIENT
Start: 2018-07-24 | End: 2018-10-26 | Stop reason: SDUPTHER

## 2018-08-28 RX ORDER — ATENOLOL 25 MG/1
TABLET ORAL
Qty: 30 TAB | Refills: 3 | Status: SHIPPED | OUTPATIENT
Start: 2018-08-28 | End: 2018-10-25 | Stop reason: SDUPTHER

## 2018-09-26 DIAGNOSIS — E55.9 VITAMIN D DEFICIENCY: ICD-10-CM

## 2018-09-30 RX ORDER — NICOTINE 11MG/24HR
PATCH, TRANSDERMAL 24 HOURS TRANSDERMAL
Qty: 30 CAP | Refills: 5 | Status: SHIPPED | OUTPATIENT
Start: 2018-09-30 | End: 2019-05-27 | Stop reason: SDUPTHER

## 2018-10-25 RX ORDER — ATENOLOL 25 MG/1
TABLET ORAL
Qty: 90 TAB | Refills: 0 | Status: SHIPPED | OUTPATIENT
Start: 2018-10-25 | End: 2019-01-27 | Stop reason: SDUPTHER

## 2018-10-26 DIAGNOSIS — E11.9 CONTROLLED TYPE 2 DIABETES MELLITUS WITHOUT COMPLICATION, WITH LONG-TERM CURRENT USE OF INSULIN (HCC): ICD-10-CM

## 2018-10-26 DIAGNOSIS — Z79.4 CONTROLLED TYPE 2 DIABETES MELLITUS WITHOUT COMPLICATION, WITH LONG-TERM CURRENT USE OF INSULIN (HCC): ICD-10-CM

## 2018-10-28 RX ORDER — INSULIN GLARGINE 100 [IU]/ML
INJECTION, SOLUTION SUBCUTANEOUS
Qty: 3 ML | Refills: 0 | Status: SHIPPED | OUTPATIENT
Start: 2018-10-28 | End: 2018-11-14 | Stop reason: SDUPTHER

## 2018-10-29 DIAGNOSIS — K59.00 CONSTIPATION, UNSPECIFIED CONSTIPATION TYPE: ICD-10-CM

## 2018-10-29 NOTE — TELEPHONE ENCOUNTER
Attempted to contact patient, none of the telephone numbers that we have on file are working numbers.

## 2018-10-30 RX ORDER — LINACLOTIDE 145 UG/1
CAPSULE, GELATIN COATED ORAL
Qty: 30 CAP | Refills: 2 | OUTPATIENT
Start: 2018-10-30

## 2018-11-14 ENCOUNTER — OFFICE VISIT (OUTPATIENT)
Dept: FAMILY MEDICINE CLINIC | Age: 43
End: 2018-11-14

## 2018-11-14 VITALS
WEIGHT: 240.2 LBS | HEIGHT: 66 IN | TEMPERATURE: 98.4 F | HEART RATE: 67 BPM | SYSTOLIC BLOOD PRESSURE: 101 MMHG | DIASTOLIC BLOOD PRESSURE: 63 MMHG | OXYGEN SATURATION: 100 % | RESPIRATION RATE: 20 BRPM | BODY MASS INDEX: 38.6 KG/M2

## 2018-11-14 DIAGNOSIS — E11.9 CONTROLLED TYPE 2 DIABETES MELLITUS WITHOUT COMPLICATION, WITH LONG-TERM CURRENT USE OF INSULIN (HCC): ICD-10-CM

## 2018-11-14 DIAGNOSIS — K59.00 CONSTIPATION, UNSPECIFIED CONSTIPATION TYPE: ICD-10-CM

## 2018-11-14 DIAGNOSIS — Z79.4 CONTROLLED TYPE 2 DIABETES MELLITUS WITHOUT COMPLICATION, WITH LONG-TERM CURRENT USE OF INSULIN (HCC): ICD-10-CM

## 2018-11-14 DIAGNOSIS — R21 RASH: Primary | ICD-10-CM

## 2018-11-14 RX ORDER — INSULIN GLARGINE 100 [IU]/ML
INJECTION, SOLUTION SUBCUTANEOUS
Qty: 3 PEN | Refills: 3 | Status: SHIPPED | OUTPATIENT
Start: 2018-11-14 | End: 2019-03-20 | Stop reason: SDUPTHER

## 2018-11-14 RX ORDER — NYSTATIN 100000 [USP'U]/G
POWDER TOPICAL 2 TIMES DAILY
Qty: 60 G | Refills: 5 | Status: SHIPPED | OUTPATIENT
Start: 2018-11-14

## 2018-11-14 NOTE — PROGRESS NOTES
Chief Complaint   Patient presents with    Skin Problem     boils located on l. side abdominal fold, vaginal. Skin irritaion on r. side abdominal fold    Referral Request     GI for consitpation     she is a 41y.o. year old female who presents for evalution.h  She has diabetes and not doing blood sugar checks    She has a large abd  pan and has c/o off and on rashes under it  She walks for exercise 10-15 mins twice a day-walking her dog. She did not get her blood drawn yet      Reviewed PmHx, RxHx, FmHx, SocHx, AllgHx and updated and dated in the chart.     Aspirin yes ____   No____ N/A____    Patient Active Problem List    Diagnosis    Fibromyalgia    DDD (degenerative disc disease), lumbar    Well controlled type 2 diabetes mellitus ([address]    Cervical stenosis of spine    Chronic neck pain    SONY (obstructive sleep apnea)    DDD (degenerative disc disease), cervical     followed by ortho      Arthritis     lower back      Asthma     last attack 1994      Bipolar 1 disorder ([address]    GERD (gastroesophageal reflux disease)    High cholesterol    HTN, goal below 140/90    Migraines    Morbid obesity ([address]    Unspecified sleep apnea     no cpap-NEVER TESTED      Diabetes mellitus type 2, controlled ([address]     a1c 7.4% at outside EMR         Nurse notes were reviewed and copied and are correct  Review of Systems - negative except as listed above in the HPI    Objective:     Vitals:    11/14/18 1418   BP: 101/63   Pulse: 67   Resp: 20   Temp: 98.4 °F (36.9 °C)   TempSrc: Oral   SpO2: 100%   Weight: 240 lb 3.2 oz (109 kg)   Height: 5' 6\" (1.676 m)       Physical Examination: General appearance - alert, well appearing, and in no distress  Mental status - alert, oriented to person, place, and time  Neck - supple, no significant adenopathy  Chest - clear to auscultation, no wheezes, rales or rhonchi, symmetric air entry  Heart - normal rate, regular rhythm, normal S1, S2, no murmurs, rubs, clicks or gallops  Abdomen - soft, nontender, nondistended, no masses or organomegaly  Small nonerythematous papule on the left side under pan. Skin - LESIONS NOTED: as above, no other erythema or broken down skin areas      Assessment/ Plan:   Diagnoses and all orders for this visit:    1. Rash  Cont using the nystop it is controlling the monilia that comes and goes  2. Constipation, unspecified constipation type  -     REFERRAL TO GASTROENTEROLOGY    3. Controlled type 2 diabetes mellitus without complication, with long-term current use of insulin ([facility])  -     flash glucose sensor (FREESTYLE ROSALINE 10 DAY SENSOR) kit; Check blood sugar 3 times daily insulin requiring tyoe 2 diabetes  -     flash glucose scanning reader (FREESTYLE ROSALINE 10 DAY READER) misc; Check blood sugar daily insulin requiring type 2 diabetyes  -     insulin glargine (LANTUS SOLOSTAR U-100 INSULIN) 100 unit/mL (3 mL) inpn; INJECT 30 UNITS SUBCUTANEOUSLY EVERY NIGHT    Other orders  -     NYSTOP powder; Apply  to affected area two (2) times a day. Follow-up Disposition:  Return in about 2 weeks (around 11/28/2018). ICD-10-CM ICD-9-CM    1. Rash R21 782.1    2. Constipation, unspecified constipation type K59.00 564.00 REFERRAL TO GASTROENTEROLOGY   3. Controlled type 2 diabetes mellitus without complication, with long-term current use of insulin ([facility]) E11.9 250.00 flash glucose sensor (FREESTYLE ROSALINE 10 DAY SENSOR) kit    Z79.4 V58.67 flash glucose scanning reader (FREESTYLE ROSALINE 10 DAY READER) misc      insulin glargine (LANTUS SOLOSTAR U-100 INSULIN) 100 unit/mL (3 mL) inpn       I have discussed the diagnosis with the patient and the intended plan as seen in the above orders. The patient has received an after-visit summary and questions were answered concerning future plans.      Medication Side Effects and Warnings were discussed with patient: yes  Patient Labs were reviewed and or requested: yes  Patient Past Records were reviewed and or requested: yes        There are no Patient Instructions on file for this visit.     The patient verbalizes understanding and agrees with the plan of care        Patient has the advanced directives booklet to review

## 2018-11-14 NOTE — PROGRESS NOTES
1. Have you been to the ER, urgent care clinic since your last visit? Hospitalized since your last visit? No    2. Have you seen or consulted any other health care providers outside of the [address] [place] [address] since your last visit? Include any pap smears or colon screening.  No     Chief Complaint   Patient presents with    Skin Problem     boils located on l. side abdominal fold, vaginal. Skin irritaion on r. side abdominal fold    Referral Request     GI for consitpation       Body Weight: 240.2  Body Fat%: 42.7  Muscle Mass Weight: 38.7  Body Water Weight: 41.9  Basal Metabolic Rate: 1924  BMI: 38.77

## 2018-11-15 LAB
ALBUMIN SERPL-MCNC: 5 G/DL (ref 3.5–5.5)
ALBUMIN/GLOB SERPL: 1.6 {RATIO} (ref 1.2–2.2)
ALP SERPL-CCNC: 59 IU/L (ref 39–117)
ALT SERPL-CCNC: 28 IU/L (ref 0–32)
AST SERPL-CCNC: 21 IU/L (ref 0–40)
BILIRUB SERPL-MCNC: 0.4 MG/DL (ref 0–1.2)
BUN SERPL-MCNC: 11 MG/DL (ref 6–24)
BUN/CREAT SERPL: 15 (ref 9–23)
CALCIUM SERPL-MCNC: 10.3 MG/DL (ref 8.7–10.2)
CHLORIDE SERPL-SCNC: 105 MMOL/L (ref 96–106)
CHOLEST SERPL-MCNC: 205 MG/DL (ref 100–199)
CO2 SERPL-SCNC: 20 MMOL/L (ref 20–29)
CREAT SERPL-MCNC: 0.71 MG/DL (ref 0.57–1)
EST. AVERAGE GLUCOSE BLD GHB EST-MCNC: 111 MG/DL
GLOBULIN SER CALC-MCNC: 3.1 G/DL (ref 1.5–4.5)
GLUCOSE SERPL-MCNC: 66 MG/DL (ref 65–99)
HBA1C MFR BLD: 5.5 % (ref 4.8–5.6)
HDLC SERPL-MCNC: 76 MG/DL
INTERPRETATION, 910389: NORMAL
LDLC SERPL CALC-MCNC: 116 MG/DL (ref 0–99)
POTASSIUM SERPL-SCNC: 4 MMOL/L (ref 3.5–5.2)
PROT SERPL-MCNC: 8.1 G/DL (ref 6–8.5)
SODIUM SERPL-SCNC: 142 MMOL/L (ref 134–144)
TRIGL SERPL-MCNC: 67 MG/DL (ref 0–149)
VLDLC SERPL CALC-MCNC: 13 MG/DL (ref 5–40)

## 2018-11-19 DIAGNOSIS — E66.9 OBESITY (BMI 30-39.9): ICD-10-CM

## 2018-11-19 RX ORDER — TOPIRAMATE 25 MG/1
TABLET ORAL
Qty: 180 TAB | Refills: 3 | Status: SHIPPED | OUTPATIENT
Start: 2018-11-19 | End: 2019-11-27 | Stop reason: SDUPTHER

## 2018-11-19 NOTE — PROGRESS NOTES
The blood sugar control test is normal  The liver and kidney tests are normal  The cholesterol is still a little high but is lower than it was the last time it was checked 2 years ago. This needs to be rechecked in 6 months  Keep working on eating low carb, low fat-no fried food, avoid junk food and fast foods.   Also aim for at least 150 minutes of exercise per week

## 2018-11-26 DIAGNOSIS — E11.21 CONTROLLED TYPE 2 DIABETES MELLITUS WITH DIABETIC NEPHROPATHY, WITHOUT LONG-TERM CURRENT USE OF INSULIN (HCC): Primary | ICD-10-CM

## 2018-12-02 RX ORDER — METFORMIN HYDROCHLORIDE 500 MG/1
1000 TABLET, EXTENDED RELEASE ORAL
Qty: 180 TAB | Refills: 0 | Status: SHIPPED | OUTPATIENT
Start: 2018-12-02 | End: 2019-10-21 | Stop reason: SDUPTHER

## 2018-12-02 RX ORDER — POTASSIUM CHLORIDE 1500 MG/1
TABLET, FILM COATED, EXTENDED RELEASE ORAL
Qty: 90 TAB | Refills: 0 | Status: SHIPPED | OUTPATIENT
Start: 2018-12-02 | End: 2019-02-28 | Stop reason: SDUPTHER

## 2018-12-05 ENCOUNTER — OFFICE VISIT (OUTPATIENT)
Dept: FAMILY MEDICINE CLINIC | Age: 43
End: 2018-12-05

## 2018-12-05 VITALS
HEART RATE: 67 BPM | SYSTOLIC BLOOD PRESSURE: 117 MMHG | HEIGHT: 66 IN | TEMPERATURE: 98.6 F | WEIGHT: 241.4 LBS | OXYGEN SATURATION: 98 % | DIASTOLIC BLOOD PRESSURE: 78 MMHG | BODY MASS INDEX: 38.8 KG/M2 | RESPIRATION RATE: 20 BRPM

## 2018-12-05 DIAGNOSIS — E66.9 OBESITY (BMI 30-39.9): ICD-10-CM

## 2018-12-05 DIAGNOSIS — E55.9 VITAMIN D DEFICIENCY: ICD-10-CM

## 2018-12-05 DIAGNOSIS — E78.5 HYPERLIPIDEMIA LDL GOAL <100: ICD-10-CM

## 2018-12-05 DIAGNOSIS — E11.21 CONTROLLED TYPE 2 DIABETES MELLITUS WITH DIABETIC NEPHROPATHY, WITHOUT LONG-TERM CURRENT USE OF INSULIN (HCC): Primary | ICD-10-CM

## 2018-12-05 DIAGNOSIS — E66.01 MORBID OBESITY (HCC): ICD-10-CM

## 2018-12-05 DIAGNOSIS — Z12.39 SCREENING FOR MALIGNANT NEOPLASM OF BREAST: ICD-10-CM

## 2018-12-05 DIAGNOSIS — F31.9 BIPOLAR 1 DISORDER (HCC): ICD-10-CM

## 2018-12-05 NOTE — PROGRESS NOTES
Weight Loss Progress Note: follow up Physician Visit      Ana Todd is a 41 y.o. female with BMI ,38.96 who is here for her follow up  Evaluation for the medical bariatric care. CC: I want to be healthier    She has been doing the LCD plan  She skips meals and drinks a lot of juice during the day  She is taking the amaryl , last a1c was 5.5 2 weeks ago  The lipds are still over 100, with the goal of 70 as I explained to her  She denies eating fast food  She tested positive for SONY, ahe did not get the cpap  She wakes up exhausted, wakes up frequently , her children said she stops breathing in the night            Weight History  Current weight 241and BMI is Body mass index is 38.96 kg/m². Goal weight bmi 29  Highest weight 248  (See weight gain time line scanned into media section of chart)        Significant Medical History    Update on sleep apnea and  CPAP no    Ever had bariatric surgery: no    Pregnant or planning on becoming pregnant w/in 6 months: no         Significant Psychosocial History   Any history of drug abuse or dependence: no  Current Major Lifestyle Changes: no  Any potential unsupportive people: no          Social History  Social History     Tobacco Use    Smoking status: Never Smoker    Smokeless tobacco: Never Used   Substance Use Topics    Alcohol use: Yes     Comment: SOCIAL      How many times a week do you eat out? 0     Do you drink any EtOH?  no   If so, how much? Do you have upcoming any travel in the next 6 weeks?  no   If so, what do you have planned?           Exercise  How many days a week do you currently exercise?  0 days  Have you ever been told by a physician not to exercise?  no      Objective  Visit Vitals  /78   Pulse 67   Temp 98.6 °F (37 °C) (Oral)   Resp 20   Ht 5' 6\" (1.676 m)   Wt 241 lb 6.4 oz (109.5 kg)   SpO2 98%   BMI 38.96 kg/m²       Bicep - (right ) circumference  Waist Circumference: See Weight Management Doc Flowsheet  Neck Circumference: See Weight Management Doc Flowsheet  Percent Body Fat: See Weight Management Doc Flowsheet  Weight Metrics 12/5/2018 12/5/2018 11/14/2018 5/1/2018 5/1/2018 12/19/2017 11/7/2017   Weight - 241 lb 6.4 oz 240 lb 3.2 oz - 248 lb 11.2 oz 243 lb 234 lb   Neck Circ (inches) 14.25 - - 16 - - -   Waist Measure Inches 45 - - 46.5 - - -   Exercise Mins/week - - - 60 - - -   Body Fat % - - - - - - -   BMI - 38.96 kg/m2 38.77 kg/m2 - 40.14 kg/m2 39.22 kg/m2 37.77 kg/m2         Labs:       Review of Systems  Complete ROS negative except where noted above      Physical Exam    Vital Signs Reviewed  Weight Management Metrics Reviewed    Vital Signs Reviewed  Appearance: Obese, A&O x 3, NAD  HEENT:  NC/AT, EOMI, PERRL, No scleral icterus, malampatti score:  Skin:    Skin tags - no   Acanthosis Nigricans - no  Neck:  No nodes, thyromegaly   Heart:  RRR without M/R/G  Lungs:  CTAB, no rhonchi, rales, or wheezes with good air exchange   Abdomen:  Non-tender, pos bowel sounds; hepatomegaly -   Ext:  No C/C/E        Assessment & Plan  Diagnoses and all orders for this visit:    1. Controlled type 2 diabetes mellitus with diabetic nephropathy, without long-term current use of insulin (HCC)  Reducing to 1 mg amaryl twice a day and hoping to stop completely soon    2. Obesity (BMI 30-39. 9)  Diet: she has unttreated SONY. She will go back to sleep med to get on cpap  She is drinking a lot of sugar cals  She agreed to stop and drink only water  cont the low carb diet. Activity: try to increase the movement  Aim for 150 mins of exercise per week    Medication: drink miralax daily as needed  Reduce miralax to 1/2 tab twice a day  3. Vitamin D deficiency  Monitoring and treating  4. Hyperlipidemia LDL goal <100  Last level discussed today and a plan in motion      6. Bipolar 1 disorder (HCC)  stable  7. Screening for malignant neoplasm of breast  -     YAQU[facility]IN MAMMO BI SCREENING INCL CAD;  Future        Based on his history and exam, Apple Leija is a good candidate for the  [facility]           There are no Patient Instructions on file for this visit. Follow-up Disposition:  Return in about 2 weeks (around 12/19/2018). Over 50% of the 30minutes face to face time with Kel consisted of counseling & coordinating and/or discussing treatment plans in reference to her obesity The primary encounter diagnosis was Controlled type 2 diabetes mellitus with diabetic nephropathy, without long-term current use of insulin ([address] Diagnoses of Obesity (BMI 30-39.9), Vitamin D deficiency, Hyperlipidemia LDL goal <100, Morbid obesity ([address] Bipolar 1 disorder ([address] and Screening for malignant neoplasm of breast were also pertinent to this visit.   The patient verbalizes understanding and agrees with the plan of care    Patient has the advanced directives  booklet to review

## 2018-12-05 NOTE — PROGRESS NOTES
1. Have you been to the ER, urgent care clinic since your last visit? Hospitalized since your last visit? No    2. Have you seen or consulted any other health care providers outside of the [address] [place] [address] since your last visit? Include any pap smears or colon screening.  GI specialist      Chief Complaint   Patient presents with    Weight Management     Body Weight: 241.4  Body Fat%: 42.9  Muscle Mass Weight: 38.9  Body Water Weight: 72.0  Basal Metabolic Rate: 0576  BMI: 38.96

## 2018-12-28 DIAGNOSIS — E11.21 CONTROLLED TYPE 2 DIABETES MELLITUS WITH DIABETIC NEPHROPATHY, WITHOUT LONG-TERM CURRENT USE OF INSULIN (HCC): Primary | ICD-10-CM

## 2019-01-10 ENCOUNTER — HOSPITAL ENCOUNTER (OUTPATIENT)
Dept: MAMMOGRAPHY | Age: 44
Discharge: HOME | End: 2019-01-10
Attending: NURSE PRACTITIONER
Payer: MEDICARE

## 2019-01-10 DIAGNOSIS — N63.0 BREAST LUMP OR MASS: ICD-10-CM

## 2019-01-10 DIAGNOSIS — N60.31 FIBROSCLEROSIS OF RIGHT BREAST: ICD-10-CM

## 2019-01-10 PROCEDURE — 76642 ULTRASOUND BREAST LIMITED: CPT

## 2019-01-10 PROCEDURE — 77066 DX MAMMO INCL CAD BI: CPT

## 2019-01-21 ENCOUNTER — ANESTHESIA EVENT (OUTPATIENT)
Dept: ENDOSCOPY | Age: 44
End: 2019-01-21
Payer: MEDICARE

## 2019-01-22 ENCOUNTER — HOSPITAL ENCOUNTER (OUTPATIENT)
Age: 44
Setting detail: OUTPATIENT SURGERY
Discharge: HOME | End: 2019-01-22
Attending: INTERNAL MEDICINE | Admitting: INTERNAL MEDICINE
Payer: MEDICARE

## 2019-01-22 ENCOUNTER — ANESTHESIA (OUTPATIENT)
Dept: ENDOSCOPY | Age: 44
End: 2019-01-22
Payer: MEDICARE

## 2019-01-22 VITALS
HEART RATE: 89 BPM | OXYGEN SATURATION: 100 % | RESPIRATION RATE: 14 BRPM | DIASTOLIC BLOOD PRESSURE: 86 MMHG | TEMPERATURE: 98 F | SYSTOLIC BLOOD PRESSURE: 133 MMHG

## 2019-01-22 LAB
GLUCOSE BLD STRIP.AUTO-MCNC: 108 MG/DL (ref 65–100)
SERVICE CMNT-IMP: ABNORMAL

## 2019-01-22 PROCEDURE — 88305 TISSUE EXAM BY PATHOLOGIST: CPT

## 2019-01-22 PROCEDURE — 74011250636 HC RX REV CODE- 250/636

## 2019-01-22 PROCEDURE — 77030027957 HC TBNG IRR ENDOGTR BUSS -B: Performed by: INTERNAL MEDICINE

## 2019-01-22 PROCEDURE — 77030009426 HC FCPS BIOP ENDOSC BSC -B: Performed by: INTERNAL MEDICINE

## 2019-01-22 PROCEDURE — 82962 GLUCOSE BLOOD TEST: CPT

## 2019-01-22 PROCEDURE — 76060000032 HC ANESTHESIA 0.5 TO 1 HR: Performed by: INTERNAL MEDICINE

## 2019-01-22 PROCEDURE — 76040000007: Performed by: INTERNAL MEDICINE

## 2019-01-22 RX ORDER — SODIUM CHLORIDE 9 MG/ML
INJECTION, SOLUTION INTRAVENOUS
Status: DISCONTINUED | OUTPATIENT
Start: 2019-01-22 | End: 2019-01-22 | Stop reason: HOSPADM

## 2019-01-22 RX ORDER — DIPHENHYDRAMINE HYDROCHLORIDE 50 MG/ML
50 INJECTION, SOLUTION INTRAMUSCULAR; INTRAVENOUS ONCE
Status: DISCONTINUED | OUTPATIENT
Start: 2019-01-22 | End: 2019-01-22 | Stop reason: HOSPADM

## 2019-01-22 RX ORDER — DEXTROMETHORPHAN/PSEUDOEPHED 2.5-7.5/.8
1.2 DROPS ORAL
Status: DISCONTINUED | OUTPATIENT
Start: 2019-01-22 | End: 2019-01-22 | Stop reason: HOSPADM

## 2019-01-22 RX ORDER — PROPOFOL 10 MG/ML
INJECTION, EMULSION INTRAVENOUS AS NEEDED
Status: DISCONTINUED | OUTPATIENT
Start: 2019-01-22 | End: 2019-01-22 | Stop reason: HOSPADM

## 2019-01-22 RX ORDER — SODIUM CHLORIDE 9 MG/ML
100 INJECTION, SOLUTION INTRAVENOUS CONTINUOUS
Status: DISCONTINUED | OUTPATIENT
Start: 2019-01-22 | End: 2019-01-22 | Stop reason: HOSPADM

## 2019-01-22 RX ORDER — EPINEPHRINE 0.1 MG/ML
1 INJECTION INTRACARDIAC; INTRAVENOUS
Status: DISCONTINUED | OUTPATIENT
Start: 2019-01-22 | End: 2019-01-22 | Stop reason: HOSPADM

## 2019-01-22 RX ORDER — MIDAZOLAM HYDROCHLORIDE 1 MG/ML
.25-1 INJECTION, SOLUTION INTRAMUSCULAR; INTRAVENOUS
Status: DISCONTINUED | OUTPATIENT
Start: 2019-01-22 | End: 2019-01-22 | Stop reason: HOSPADM

## 2019-01-22 RX ORDER — ATROPINE SULFATE 0.1 MG/ML
0.5 INJECTION INTRAVENOUS
Status: DISCONTINUED | OUTPATIENT
Start: 2019-01-22 | End: 2019-01-22 | Stop reason: HOSPADM

## 2019-01-22 RX ORDER — NALOXONE HYDROCHLORIDE 0.4 MG/ML
0.4 INJECTION, SOLUTION INTRAMUSCULAR; INTRAVENOUS; SUBCUTANEOUS
Status: DISCONTINUED | OUTPATIENT
Start: 2019-01-22 | End: 2019-01-22 | Stop reason: HOSPADM

## 2019-01-22 RX ORDER — FENTANYL CITRATE 50 UG/ML
100 INJECTION, SOLUTION INTRAMUSCULAR; INTRAVENOUS
Status: DISCONTINUED | OUTPATIENT
Start: 2019-01-22 | End: 2019-01-22 | Stop reason: HOSPADM

## 2019-01-22 RX ORDER — SODIUM CHLORIDE 0.9 % (FLUSH) 0.9 %
5-40 SYRINGE (ML) INJECTION EVERY 8 HOURS
Status: DISCONTINUED | OUTPATIENT
Start: 2019-01-22 | End: 2019-01-22 | Stop reason: HOSPADM

## 2019-01-22 RX ORDER — SODIUM CHLORIDE 0.9 % (FLUSH) 0.9 %
5-40 SYRINGE (ML) INJECTION AS NEEDED
Status: DISCONTINUED | OUTPATIENT
Start: 2019-01-22 | End: 2019-01-22 | Stop reason: HOSPADM

## 2019-01-22 RX ORDER — FLUMAZENIL 0.1 MG/ML
0.2 INJECTION INTRAVENOUS
Status: DISCONTINUED | OUTPATIENT
Start: 2019-01-22 | End: 2019-01-22 | Stop reason: HOSPADM

## 2019-01-22 RX ORDER — LIDOCAINE HYDROCHLORIDE 20 MG/ML
INJECTION, SOLUTION EPIDURAL; INFILTRATION; INTRACAUDAL; PERINEURAL AS NEEDED
Status: DISCONTINUED | OUTPATIENT
Start: 2019-01-22 | End: 2019-01-22 | Stop reason: HOSPADM

## 2019-01-22 RX ADMIN — PROPOFOL 50 MG: 10 INJECTION, EMULSION INTRAVENOUS at 15:02

## 2019-01-22 RX ADMIN — PROPOFOL 100 MG: 10 INJECTION, EMULSION INTRAVENOUS at 14:58

## 2019-01-22 RX ADMIN — SODIUM CHLORIDE: 9 INJECTION, SOLUTION INTRAVENOUS at 14:53

## 2019-01-22 RX ADMIN — PROPOFOL 50 MG: 10 INJECTION, EMULSION INTRAVENOUS at 15:06

## 2019-01-22 RX ADMIN — PROPOFOL 100 MG: 10 INJECTION, EMULSION INTRAVENOUS at 15:10

## 2019-01-22 RX ADMIN — LIDOCAINE HYDROCHLORIDE 80 MG: 20 INJECTION, SOLUTION EPIDURAL; INFILTRATION; INTRACAUDAL; PERINEURAL at 14:58

## 2019-01-22 NOTE — ROUTINE PROCESS
Mejia Loser  1975  [id number]    Situation:  Verbal report received from: Niko Gao RN  Procedure: Procedure(s):  COLONOSCOPY  ESOPHAGOGASTRODUODENOSCOPY (EGD)  ENDOSCOPIC POLYPECTOMY    Background:    Preoperative diagnosis: CONSTIPATION, RECTAL BLEEDING, NAUSEA  Postoperative diagnosis: 1.- normal  2- rectal polyp    :  Dr. Gautam Fam   Assistant(s): Endoscopy Technician-1: Buck Siemens  Endoscopy RN-1: uJan Cuello RN    Specimens:   ID Type Source Tests Collected by Time Destination   1 : polyps (2) Preservative Rectum  Fozia Hoover MD 1/22/2019 1517 Pathology     H. Pylori  no    Assessment:  Intra-procedure medications     Anesthesia gave intra-procedure sedation and medications, see anesthesia flow sheet yes    Intravenous fluids: NS@ KVO     Vital signs stable     Abdominal assessment: round and soft     Recommendation:  Discharge patient per MD order.     Family or Friend   Permission to share finding with family or friend yes

## 2019-01-22 NOTE — PROCEDURES
[address]  [address] [facility] [address] (EGD) Procedure Note    [place]  1975  [id number]      Procedure: Endoscopic Gastroduodenoscopy --diagnostic    Indication:  melena     Pre-operative Diagnosis: see indication above    Post-operative Diagnosis: see findings below    : Deon Russo MD    Referring Provider:  Dana Garcia MD      Anesthesia/Sedation:  MAC anesthesia Propofol        Procedure Details     After informed consent was obtained for the procedure, with all risks and benefits of procedure explained the patient was taken to the endoscopy suite and placed in the left lateral decubitus position. Following sequential administration of sedation as per above, the endoscope was inserted into the mouth and advanced under direct vision to second portion of the duodenum. A careful inspection was made as the gastroscope was withdrawn, including a retroflexed view of the proximal stomach; findings and interventions are described below. Findings:   Esophagus:normal  Stomach: normal   Duodenum: normal      Therapies:  none    Specimens: none         EBL: None      Complications:   None; patient tolerated the procedure well. Impression:    -Normal upper endoscopy, with no endoscopic evidence of neoplasia or mucosal abnormality. Recommendations:  -Proceed to colonoscopy    Signed By: Deon Russo MD     1/22/2019  3:29 PM

## 2019-01-22 NOTE — ANESTHESIA PREPROCEDURE EVALUATION
Anesthetic History   No history of anesthetic complications            Review of Systems / Medical History  Patient summary reviewed, nursing notes reviewed and pertinent labs reviewed    Pulmonary        Sleep apnea    Asthma        Neuro/Psych         Headaches     Cardiovascular    Hypertension          Hyperlipidemia         GI/Hepatic/Renal     GERD           Endo/Other    Diabetes: type 2    Morbid obesity and arthritis     Other Findings              Physical Exam    Airway  Mallampati: III  TM Distance: > 6 cm  Neck ROM: normal range of motion   Mouth opening: Normal     Cardiovascular  Regular rate and rhythm,  S1 and S2 normal,  no murmur, click, rub, or gallop             Dental  No notable dental hx       Pulmonary  Breath sounds clear to auscultation               Abdominal  GI exam deferred       Other Findings            Anesthetic Plan    ASA: 3  Anesthesia type: MAC            Anesthetic plan and risks discussed with: Patient

## 2019-01-22 NOTE — DISCHARGE INSTRUCTIONS
[address]  174 [facility], [address] [place] [address]    EGD/COLONOSCOPY DISCHARGE INSTRUCTIONS    Rebel Lacey  [id number]  1975    Discomfort:  Sore throat- throat lozenges or warm salt water gargle  redness at IV site- apply warm compress to area; if redness or soreness persist- contact your physician  Gaseous discomfort- walking, belching will help relieve any discomfort  You may not operate a vehicle for 12 hours  You may not engage in an occupation involving machinery or appliances for rest of today  You may not drink alcoholic beverages for at least 12 hours  Avoid making any critical decisions for at least 24 hour  DIET  You may resume your regular diet - however -  remember your colon is empty and a heavy meal will produce gas. Avoid these foods:  vegetables, fried / greasy foods, carbonated drinks    ACTIVITY  You may resume your normal daily activities   Spend the remainder of the day resting -  avoid any strenuous activity. CALL M.D. ANY SIGN OF   Increasing pain, nausea, vomiting  Abdominal distension (swelling)  New increased bleeding (oral or rectal)  Fever (chills)  Pain in chest area  Bloody discharge from nose or mouth  Shortness of breath    Follow-up Instructions:   Call Dr. Juaquin Day for any questions or problems. Telephone # [phone]    ENDOSCOPY FINDINGS:   Your endoscopy was normal. Your colonoscopy showed two small polyps, which were removed. We will contact you about the pathology results and when your next colonoscopy will be due. You have small internal hemorrhoids, which could explain the bleeding that you experienced. Please maintain a high fiber diet. Signed By: Indu Nina.  Lorrie Iglesias MD     1/22/2019  3:28 PM

## 2019-01-22 NOTE — PROCEDURES
[address]  174 [facility], [address] [place] [address]        Colonoscopy Operative Report    Elie Mitchell  [id number]  1975      Procedure Type:   Colonoscopy with polypectomy (cold biopsy)     Indications:    Lower rectal bleeding         Pre-operative Diagnosis: see indication above    Post-operative Diagnosis:  See findings below    :  Gabriela Gtz MD      Referring Provider: Lidia Jo MD      Sedation:  MAC anesthesia Propofol      Procedure Details:  After informed consent was obtained with all risks and benefits of procedure explained and preoperative exam completed, the patient was taken to the endoscopy suite and placed in the left lateral decubitus position. Upon sequential sedation as per above, a digital rectal exam was performed demonstrating internal hemorrhoids. The Olympus pediatric videocolonoscope  was inserted in the rectum and carefully advanced to the terminal ileum. The cecum was identified by the ileocecal valve and appendiceal orifice. The quality of preparation was good. The colonoscope was slowly withdrawn with careful evaluation between folds. Retroflexion in the rectum was completed . Findings:   Rectum: small internal hemorrhoids, two sessile 3 mm polyps removed with cold biopsy forceps  Sigmoid: normal  Descending Colon: normal  Transverse Colon: normal  Ascending Colon: normal  Cecum: normal  Terminal Ileum: normal      Specimen Removed: 1. Rectal polyps    Complications: None. EBL:  None. Impression:      1. Rectal polyps - removed  2. Small internal hemorrhoids    Recommendations:  1. Follow up surgical pathology  2. If adenomatous, repeat colonoscopy in 5 years. If hyperplastic, repeat colonoscopy in 10 years  3. High fiber diet education    Signed By: Gabriela Gtz MD     1/22/2019  3:31 PM

## 2019-01-22 NOTE — PERIOP NOTES

## 2019-01-22 NOTE — H&P
[address] [facility]  [place], [address]      History and Physical       NAME:  Elicia Reese   :   1975   MRN:   [id number]             History of Present Illness:  Patient is a 41 y.o. who is seen for constipation and rectal bleeding. PMH:  Past Medical History:   Diagnosis Date    Arthritis     lower back    Asthma     last attack     Bipolar 1 disorder ([address]     Chronic neck pain     DDD (degenerative disc disease), cervical     followed by ortho    Diabetes mellitus type 2, controlled ([address]     Fibromyalgia     GERD (gastroesophageal reflux disease)     High cholesterol     HTN, goal below 140/90     Migraines     Morbid obesity ([address]     SONY (obstructive sleep apnea)     DOESN'T USE CPAP       PSH:  Past Surgical History:   Procedure Laterality Date    BREAST SURGERY PROCEDURE UNLISTED  13     RIGHT BREAST EXCISION OF MULTIPLE SEBACEOUS CYSTS    HX BREAST BIOPSY  2014    right excision- sebaceous cyst    HX  SECTION      HX HEENT      wisdom teeth    HX ORTHOPAEDIC  2017    CERVICAL FUSION    HX OTHER SURGICAL  1997    spinal tap - viral menigitis    HX OTHER SURGICAL      SEBACEOUS CYST-BACK    HX TOTAL LAPAROSCOPIC HYSTERECTOMY W/ BS&O         Allergies: Allergies   Allergen Reactions    Pcn [Penicillins] Swelling     Swelling throat    Shellfish Containing Products Swelling     Swells throat and eyes    Morphine Other (comments)     FAST HEARTBEAT AND NAUSEA PER PATIENT       Home Medications:  Prior to Admission Medications   Prescriptions Last Dose Informant Patient Reported? Taking? LINZESS 145 mcg cap capsule 2019 at Unknown time  No Yes   Sig: TAKE ONE CAPSULE BY MOUTH EVERY DAY BEFORE BREAKFAST   NYSTOP powder Unknown at Unknown time  No No   Sig: Apply  to affected area two (2) times a day.    VITAMIN D3 2,000 unit cap capsule 1/15/2019 at Unknown time  No Yes   Sig: TAKE ONE CAPSULE BY MOUTH EVERY DAY   atenolol (TENORMIN) 25 mg tablet 1/20/2019  No No   Sig: TAKE 1 TABLET BY MOUTH ONCE DAILY   cyclobenzaprine (FLEXERIL) 10 mg tablet Unknown at Unknown time  Yes No   Sig: Take 10 mg by mouth two (2) times a day. diclofenac EC (VOLTAREN) 75 mg EC tablet 12/1/2018 at Unknown time  No Yes   Sig: TAKE 1 TABLET BY MOUTH TWICE A DAY   flash glucose scanning reader (FREESTYLE ROSALINE 10 DAY READER) misc Unknown at Unknown time  No No   Sig: Check blood sugar daily insulin requiring type 2 diabetyes   flash glucose sensor (FREESTYLE ROSALINE 10 DAY SENSOR) kit Unknown at Unknown time  No No   Sig: Check blood sugar 3 times daily insulin requiring tyoe 2 diabetes   furosemide (LASIX) 40 mg tablet Unknown at Unknown time  No No   Sig: take 1 tablet by mouth once daily   gabapentin (NEURONTIN) 400 mg capsule Unknown at Unknown time  No No   Sig: Take 1 Cap by mouth two (2) times a day. glimepiride (AMARYL) 2 mg tablet 1/20/2019 at Unknown time  No No   Sig: TAKE 1 TAB BY MOUTH TWO (2) TIMES A DAY. glucose blood VI test strips (ASCENSIA CONTOUR) strip Unknown at Unknown time  No No   Sig: TEST BLOOD SUGAR THREE TIMES DAILY. Dx:E11.9   insulin glargine (LANTUS SOLOSTAR U-100 INSULIN) 100 unit/mL (3 mL) inpn 1/20/2019  No No   Sig: INJECT 30 UNITS SUBCUTANEOUSLY EVERY NIGHT   metFORMIN ER (GLUCOPHAGE XR) 500 mg tablet 1/20/2019  No No   Sig: Take 2 Tabs by mouth daily (with dinner). methocarbamol (ROBAXIN) 750 mg tablet 1/15/2019 at Unknown time  No Yes   Sig: Take 1 Tab by mouth three (3) times daily. montelukast (SINGULAIR) 10 mg tablet 1/20/2019  No No   Sig: take 1 tablet by mouth once daily   polyethylene glycol (MIRALAX) 17 gram packet 1/17/2019  No No   Sig: Take 1 Packet by mouth daily.    potassium chloride SR (K-TAB) 20 mEq tablet 1/20/2019  No No   Sig: TAKE 1 TABLET BY MOUTH ONCE DAILY   pravastatin (PRAVACHOL) 40 mg tablet 1/20/2019  No No   Sig: TAKE 1 TABLET BY MOUTH EVERY EVENING   topiramate (TOPAMAX) 25 mg tablet 1/20/2019  No No   Sig: TAKE 1 TABLET TWICE A DAY WITH MEALS   traZODone (DESYREL) 100 mg tablet 1/21/2019 at Unknown time  No Yes   Sig: take 1 tablet by mouth at bedtime      Facility-Administered Medications: None       Hospital Medications:  Current Facility-Administered Medications   Medication Dose Route Frequency    0.9% sodium chloride infusion  100 mL/hr IntraVENous CONTINUOUS    sodium chloride (NS) flush 5-40 mL  5-40 mL IntraVENous Q8H    sodium chloride (NS) flush 5-40 mL  5-40 mL IntraVENous PRN    midazolam (VERSED) injection 0.25-10 mg  0.25-10 mg IntraVENous Multiple    fentaNYL citrate (PF) injection 100 mcg  100 mcg IntraVENous Multiple    naloxone (NARCAN) injection 0.4 mg  0.4 mg IntraVENous Multiple    flumazenil (ROMAZICON) 0.1 mg/mL injection 0.2 mg  0.2 mg IntraVENous Multiple    simethicone (MYLICON) 60EX/4.0CD oral drops 80 mg  1.2 mL Oral Multiple    diphenhydrAMINE (BENADRYL) injection 50 mg  50 mg IntraVENous ONCE    atropine injection 0.5 mg  0.5 mg IntraVENous ONCE PRN    EPINEPHrine (ADRENALIN) 0.1 mg/mL syringe 1 mg  1 mg Endoscopically ONCE PRN       Social History:  Social History     Tobacco Use    Smoking status: Never Smoker    Smokeless tobacco: Never Used   Substance Use Topics    Alcohol use: Yes     Comment: SOCIAL        Family History:  Family History   Problem Relation Age of Onset    Cancer Maternal Aunt         OVARIAN;     Cancer Maternal Grandmother         bone cancer    Hypertension Mother     Diabetes Mother     Hypertension Father     Heart Disease Father         CHF    Cancer Paternal Grandmother 79        breast cancer    Breast Cancer Paternal Grandmother     Anesth Problems Neg Hx              Review of Systems:      Constitutional: negative fever, negative chills, negative weight loss  Eyes:   negative visual changes  ENT:   negative sore throat, tongue or lip swelling  Respiratory:  negative cough, negative dyspnea  Cards:  negative for chest pain, palpitations, lower extremity edema  GI:   See HPI  :  negative for frequency, dysuria  Integument:  negative for rash and pruritus  Heme:  negative for easy bruising and gum/nose bleeding  Musculoskel: negative for myalgias,  back pain and muscle weakness  Neuro: negative for headaches, dizziness, vertigo  Psych:  negative for feelings of anxiety, depression       Objective:   No data found. No intake/output data recorded. No intake/output data recorded. EXAM:     NEURO-a&o   HEENT-wnl   LUNGS-clear    COR-regular rate and rhythym     ABD-soft , no tenderness, no rebound, good bs     EXT-no edema     Data Review     No results for input(s): WBC, HGB, HCT, PLT, HGBEXT, HCTEXT, PLTEXT in the last 72 hours. No results for input(s): NA, K, CL, CO2, BUN, CREA, GLU, PHOS, CA in the last 72 hours. No results for input(s): SGOT, GPT, AP, TBIL, TP, ALB, GLOB, GGT, AML, LPSE in the last 72 hours. No lab exists for component: AMYP, HLPSE  No results for input(s): INR, PTP, APTT in the last 72 hours. No lab exists for component: INREXT       Assessment:   · Constipation  · Rectal bleeding     Patient Active Problem List   Diagnosis Code    Diabetes mellitus type 2, controlled ([facility] E11.9    Arthritis M19.90    Asthma J45.909    Bipolar 1 disorder ([facility] F31.9    GERD (gastroesophageal reflux disease) K21.9    High cholesterol E78.00    HTN, goal below 140/90 I10    Migraines G43.909    Morbid obesity ([facility] E66.01    Unspecified sleep apnea G47.30    DDD (degenerative disc disease), cervical M50.30    Chronic neck pain M54.2, G89.29    SONY (obstructive sleep apnea) G47.33    Cervical stenosis of spine M48.02    Well controlled type 2 diabetes mellitus ([facility] E11.9    DDD (degenerative disc disease), lumbar M51.36    Fibromyalgia M79.7     Plan:   · Endoscopic procedure with MAC     Signed By: Christina Holter.  Gallo Bernabe MD     1/22/2019  2:38 PM

## 2019-01-24 NOTE — ANESTHESIA POSTPROCEDURE EVALUATION
Post-Anesthesia Evaluation and Assessment    Patient: Kike Bae MRN: [id number]  SSN: [id number]    YOB: 1975  Age: 41 y.o. Sex: female      I have evaluated the patient and they are stable and ready for discharge from the PACU. Cardiovascular Function/Vital Signs  Visit Vitals  /86   Pulse 89   Temp 36.7 °C (98 °F)   Resp 14   SpO2 100%   Breastfeeding? No       Patient is status post MAC anesthesia for Procedure(s):  COLONOSCOPY  ESOPHAGOGASTRODUODENOSCOPY (EGD)  ENDOSCOPIC POLYPECTOMY. Nausea/Vomiting: None    Postoperative hydration reviewed and adequate. Pain:  Pain Scale 1: Numeric (0 - 10) (01/22/19 1540)  Pain Intensity 1: 0 (01/22/19 1540)   Managed    Neurological Status: At baseline    Mental Status, Level of Consciousness: Alert and  oriented to person, place, and time    Pulmonary Status:   O2 Device: Room air (01/22/19 1540)   Adequate oxygenation and airway patent    Complications related to anesthesia: None    Post-anesthesia assessment completed. No concerns    Signed By: Zaina Lama MD     January 24, 2019              Procedure(s):  COLONOSCOPY  ESOPHAGOGASTRODUODENOSCOPY (EGD)  ENDOSCOPIC POLYPECTOMY.    <BSHSIANPOST>    Visit Vitals  /86   Pulse 89   Temp 36.7 °C (98 °F)   Resp 14   SpO2 100%   Breastfeeding?  No

## 2019-01-30 RX ORDER — ATENOLOL 25 MG/1
TABLET ORAL
Qty: 90 TAB | Refills: 0 | Status: SHIPPED | OUTPATIENT
Start: 2019-01-30 | End: 2019-04-28 | Stop reason: SDUPTHER

## 2019-02-24 ENCOUNTER — HOSPITAL ENCOUNTER (EMERGENCY)
Age: 44
Discharge: HOME | End: 2019-02-24
Attending: EMERGENCY MEDICINE
Payer: MEDICARE

## 2019-02-24 VITALS
TEMPERATURE: 97.5 F | HEART RATE: 73 BPM | WEIGHT: 246 LBS | RESPIRATION RATE: 18 BRPM | DIASTOLIC BLOOD PRESSURE: 71 MMHG | OXYGEN SATURATION: 100 % | BODY MASS INDEX: 39.71 KG/M2 | SYSTOLIC BLOOD PRESSURE: 123 MMHG

## 2019-02-24 DIAGNOSIS — R07.89 CHEST WALL PAIN: ICD-10-CM

## 2019-02-24 DIAGNOSIS — M54.12 CERVICAL RADICULOPATHY: Primary | ICD-10-CM

## 2019-02-24 PROCEDURE — A4565 SLINGS: HCPCS

## 2019-02-24 PROCEDURE — 93005 ELECTROCARDIOGRAM TRACING: CPT

## 2019-02-24 PROCEDURE — 74011250637 HC RX REV CODE- 250/637: Performed by: EMERGENCY MEDICINE

## 2019-02-24 PROCEDURE — 99283 EMERGENCY DEPT VISIT LOW MDM: CPT

## 2019-02-24 RX ORDER — METHOCARBAMOL 500 MG/1
500 TABLET, FILM COATED ORAL 4 TIMES DAILY
Qty: 30 TAB | Refills: 0 | Status: SHIPPED | OUTPATIENT
Start: 2019-02-24 | End: 2019-03-26 | Stop reason: ALTCHOICE

## 2019-02-24 RX ORDER — IBUPROFEN 400 MG/1
800 TABLET ORAL
Status: COMPLETED | OUTPATIENT
Start: 2019-02-24 | End: 2019-02-24

## 2019-02-24 RX ORDER — PREDNISONE 20 MG/1
60 TABLET ORAL DAILY
Qty: 15 TAB | Refills: 0 | Status: SHIPPED | OUTPATIENT
Start: 2019-02-24 | End: 2019-03-01

## 2019-02-24 RX ORDER — IBUPROFEN 800 MG/1
800 TABLET ORAL
Qty: 20 TAB | Refills: 0 | Status: SHIPPED | OUTPATIENT
Start: 2019-02-24 | End: 2019-02-25 | Stop reason: ALTCHOICE

## 2019-02-24 RX ADMIN — IBUPROFEN 800 MG: 400 TABLET, FILM COATED ORAL at 15:27

## 2019-02-24 NOTE — ED PROVIDER NOTES
EMERGENCY DEPARTMENT HISTORY AND PHYSICAL EXAM      Date: 2/24/2019  Patient Name: Kike Bae    History of Presenting Illness     Chief Complaint   Patient presents with    Chest Pain       History Provided By: Patient    HPI: Kike Bae, 41 y.o. female with PMHx significant for DM, GERD, and  HTN, presents ambulatory to the ED with cc of CP which radiates to the back and neck which began at 0100 this morning. Pt states she does a lot of heavy lifting at work. Pt states pain is made worse with movement and laying down. Pt also notes a little bit of nausea. Pt denies any SOB. FHx: CAD. Social hx: (-)tobacco, (+) social EtOH,     There are no other complaints, changes, or physical findings at this time. PCP: Juancho Leon MD    No current facility-administered medications on file prior to encounter. Current Outpatient Medications on File Prior to Encounter   Medication Sig Dispense Refill    atenolol (TENORMIN) 25 mg tablet TAKE 1 TABLET BY MOUTH EVERY DAY 90 Tab 0    glucose blood VI test strips (ASCENSIA CONTOUR) strip TEST BLOOD SUGAR THREE TIMES DAILY. Dx:E11.9 300 Strip 3    metFORMIN ER (GLUCOPHAGE XR) 500 mg tablet Take 2 Tabs by mouth daily (with dinner). 180 Tab 0    potassium chloride SR (K-TAB) 20 mEq tablet TAKE 1 TABLET BY MOUTH ONCE DAILY 90 Tab 0    topiramate (TOPAMAX) 25 mg tablet TAKE 1 TABLET TWICE A DAY WITH MEALS 180 Tab 3    NYSTOP powder Apply  to affected area two (2) times a day.  60 g 5    flash glucose sensor (FREESTYLE ROSALINE 10 DAY SENSOR) kit Check blood sugar 3 times daily insulin requiring tyoe 2 diabetes 3 Kit 3    flash glucose scanning reader (FREESTYLE ROSALINE 10 DAY READER) misc Check blood sugar daily insulin requiring type 2 diabetyes 1 Each 11    insulin glargine (LANTUS SOLOSTAR U-100 INSULIN) 100 unit/mL (3 mL) inpn INJECT 30 UNITS SUBCUTANEOUSLY EVERY NIGHT 3 Pen 3    VITAMIN D3 2,000 unit cap capsule TAKE ONE CAPSULE BY MOUTH EVERY DAY 30 Cap 5    LINZESS 145 mcg cap capsule TAKE ONE CAPSULE BY MOUTH EVERY DAY BEFORE BREAKFAST 30 Cap 2    pravastatin (PRAVACHOL) 40 mg tablet TAKE 1 TABLET BY MOUTH EVERY EVENING 90 Tab 3    traZODone (DESYREL) 100 mg tablet take 1 tablet by mouth at bedtime 90 Tab 3    glimepiride (AMARYL) 2 mg tablet TAKE 1 TAB BY MOUTH TWO (2) TIMES A DAY. 180 Tab 3    cyclobenzaprine (FLEXERIL) 10 mg tablet Take 10 mg by mouth two (2) times a day.  polyethylene glycol (MIRALAX) 17 gram packet Take 1 Packet by mouth daily. 30 Each 5    diclofenac EC (VOLTAREN) 75 mg EC tablet TAKE 1 TABLET BY MOUTH TWICE A DAY 60 Tab 5    gabapentin (NEURONTIN) 400 mg capsule Take 1 Cap by mouth two (2) times a day. 30 Cap 0    methocarbamol (ROBAXIN) 750 mg tablet Take 1 Tab by mouth three (3) times daily.  45 Tab 0    furosemide (LASIX) 40 mg tablet take 1 tablet by mouth once daily 30 Tab 1    montelukast (SINGULAIR) 10 mg tablet take 1 tablet by mouth once daily 30 Tab 6       Past History     Past Medical History:  Past Medical History:   Diagnosis Date    Arthritis     lower back    Asthma     last attack     Bipolar 1 disorder (HCC)     Chronic neck pain     DDD (degenerative disc disease), cervical     followed by ortho    Diabetes mellitus type 2, controlled ([address]     Fibromyalgia     GERD (gastroesophageal reflux disease)     High cholesterol     HTN, goal below 140/90     Migraines     Morbid obesity ([address]     SONY (obstructive sleep apnea)     DOESN'T USE CPAP       Past Surgical History:  Past Surgical History:   Procedure Laterality Date    BREAST SURGERY PROCEDURE UNLISTED  13     RIGHT BREAST EXCISION OF MULTIPLE SEBACEOUS CYSTS    COLONOSCOPY N/A 2019    COLONOSCOPY performed by Geovanna Negron MD at [address] [place] [address] ENDOSCOPY    HX BREAST BIOPSY  2014    right excision- sebaceous cyst    HX  SECTION      HX HEENT      wisdom teeth    HX ORTHOPAEDIC  2017    CERVICAL FUSION    HX OTHER SURGICAL  1997    spinal tap - viral menigitis    HX OTHER SURGICAL      SEBACEOUS CYST-BACK    HX TOTAL LAPAROSCOPIC HYSTERECTOMY W/ BS&O  2016       Family History:  Family History   Problem Relation Age of Onset    Cancer Maternal Aunt         OVARIAN;     Cancer Maternal Grandmother         bone cancer    Hypertension Mother     Diabetes Mother     Hypertension Father     Heart Disease Father         CHF    Cancer Paternal Grandmother 79        breast cancer    Breast Cancer Paternal Grandmother     Anesth Problems Neg Hx        Social History:  Social History     Tobacco Use    Smoking status: Never Smoker    Smokeless tobacco: Never Used   Substance Use Topics    Alcohol use: Yes     Comment: SOCIAL     Drug use: No       Allergies: Allergies   Allergen Reactions    Pcn [Penicillins] Swelling     Swelling throat    Shellfish Containing Products Swelling     Swells throat and eyes    Morphine Other (comments)     FAST HEARTBEAT AND NAUSEA PER PATIENT         Review of Systems   Review of Systems   Constitutional: Negative for fever. HENT: Negative for sore throat. Eyes: Negative for photophobia and redness. Respiratory: Negative for shortness of breath and wheezing. Cardiovascular: Positive for chest pain. Negative for leg swelling. Gastrointestinal: Positive for nausea. Negative for abdominal pain, blood in stool and vomiting. Genitourinary: Negative for difficulty urinating, dysuria, hematuria, menstrual problem and vaginal bleeding. Musculoskeletal: Positive for back pain and neck pain. Negative for joint swelling. Neurological: Negative for dizziness, seizures, syncope, speech difficulty, weakness, numbness and headaches. Hematological: Negative for adenopathy. Psychiatric/Behavioral: Negative for agitation, confusion and suicidal ideas. The patient is not nervous/anxious.         Physical Exam   Physical Exam   Constitutional: She is oriented to person, place, and time. She appears well-developed and well-nourished. HENT:   Head: Normocephalic and atraumatic. Mouth/Throat: Oropharynx is clear and moist.   Eyes: Conjunctivae and EOM are normal.   Neck: Normal range of motion and full passive range of motion without pain. Neck supple. Cardiovascular: Normal rate, regular rhythm, S1 normal, S2 normal, normal heart sounds, intact distal pulses and normal pulses. No murmur heard. Pulmonary/Chest: Effort normal and breath sounds normal. No respiratory distress. She has no wheezes. Abdominal: Soft. Normal appearance and bowel sounds are normal. She exhibits no distension. There is no tenderness. There is no rebound. Musculoskeletal: Normal range of motion. Tenderness of Left Trapezius  Painful ROM of L shoulder   Neurological: She is alert and oriented to person, place, and time. She has normal strength. Skin: Skin is warm, dry and intact. No rash noted. Psychiatric: She has a normal mood and affect. Her speech is normal and behavior is normal. Judgment and thought content normal.   Nursing note and vitals reviewed. Diagnostic Study Results     Labs -     Recent Results (from the past 12 hour(s))   EKG, 12 LEAD, INITIAL    Collection Time: 02/24/19  3:28 PM   Result Value Ref Range    Ventricular Rate 69 BPM    Atrial Rate 69 BPM    P-R Interval 166 ms    QRS Duration 96 ms    Q-T Interval 400 ms    QTC Calculation (Bezet) 428 ms    Calculated P Axis 46 degrees    Calculated R Axis 13 degrees    Calculated T Axis 1 degrees    Diagnosis       Normal sinus rhythm  Voltage criteria for left ventricular hypertrophy  Nonspecific T wave abnormality  Abnormal ECG  When compared with ECG of 04-OCT-2017 10:46,  No significant change was found         Radiologic Studies -   No orders to display     CT Results  (Last 48 hours)    None        CXR Results  (Last 48 hours)    None            Medical Decision Making   I am the first provider for this patient.     I reviewed the vital signs, available nursing notes, past medical history, past surgical history, family history and social history. Vital Signs-Reviewed the patient's vital signs. Patient Vitals for the past 12 hrs:   Temp Pulse Resp BP SpO2   02/24/19 1512 97.5 °F (36.4 °C) 73 18 123/71 100 %       Pulse Oximetry Analysis - 100% on RA    Cardiac Monitor:   Rate: 73 bpm     EKG interpretation: (Preliminary)1528  Rhythm: normal sinus rhythm; and regular . Rate (approx.): 69; Axis: normal; IL interval: normal; QRS interval: normal ; ST/T wave: non-specific changes; Other findings: Voltage criteria for LVH. [place] Records Reviewed: Nursing Notes and Old Medical Records    Provider Notes (Medical Decision Making):   DDx: Cervical Radiculopathy, Musculoskeletal Pain, Atypical CP. ED Course:   Initial assessment performed. The patients presenting problems have been discussed, and they are in agreement with the care plan formulated and outlined with them. I have encouraged them to ask questions as they arise throughout their visit. Critical Care Time:   0    Disposition:  DISCHARGE NOTE  3:57 PM  The patient has been re-evaluated and is ready for discharge. Reviewed available results with patient. Counseled pt on diagnosis and care plan. Pt has expressed understanding, and all questions have been answered. Pt agrees with plan and agrees to F/U as recommended, or return to the ED if their sxs worsen. Discharge instructions have been provided and explained to the pt, along with reasons to return to the ED. PLAN:  1. Current Discharge Medication List        2. Follow-up Information    None       Return to ED if worse     Diagnosis     Clinical Impression:   1. Cervical radiculopathy        Attestations: This note is prepared by Jamie Barrios, acting as Scribe for Jerson Dudley MD.    The scribe's documentation has been prepared under my direction and personally reviewed by me in its entirety.  I confirm that the note above accurately reflects all work, treatment, procedures, and medical decision making performed by me, Jose Raygoza MD

## 2019-02-24 NOTE — DISCHARGE INSTRUCTIONS
Patient Education        Back Pain: Care Instructions  Your Care Instructions    Back pain has many possible causes. It is often related to problems with muscles and ligaments of the back. It may also be related to problems with the nerves, discs, or bones of the back. Moving, lifting, standing, sitting, or sleeping in an awkward way can strain the back. Sometimes you don't notice the injury until later. Arthritis is another common cause of back pain. Although it may hurt a lot, back pain usually improves on its own within several weeks. Most people recover in 12 weeks or less. Using good home treatment and being careful not to stress your back can help you feel better sooner. Follow-up care is a key part of your treatment and safety. Be sure to make and go to all appointments, and call your doctor if you are having problems. It's also a good idea to know your test results and keep a list of the medicines you take. How can you care for yourself at home? · Sit or lie in positions that are most comfortable and reduce your pain. Try one of these positions when you lie down:  ? Lie on your back with your knees bent and supported by large pillows. ? Lie on the floor with your legs on the seat of a sofa or chair. ? Lie on your side with your knees and hips bent and a pillow between your legs. ? Lie on your stomach if it does not make pain worse. · Do not sit up in bed, and avoid soft couches and twisted positions. Bed rest can help relieve pain at first, but it delays healing. Avoid bed rest after the first day of back pain. · Change positions every 30 minutes. If you must sit for long periods of time, take breaks from sitting. Get up and walk around, or lie in a comfortable position. · Try using a heating pad on a low or medium setting for 15 to 20 minutes every 2 or 3 hours. Try a warm shower in place of one session with the heating pad. · You can also try an ice pack for 10 to 15 minutes every 2 to 3 hours. Put a thin cloth between the ice pack and your skin. · Take pain medicines exactly as directed. ? If the doctor gave you a prescription medicine for pain, take it as prescribed. ? If you are not taking a prescription pain medicine, ask your doctor if you can take an over-the-counter medicine. · Take short walks several times a day. You can start with 5 to 10 minutes, 3 or 4 times a day, and work up to longer walks. Walk on level surfaces and avoid hills and stairs until your back is better. · Return to work and other activities as soon as you can. Continued rest without activity is usually not good for your back. · To prevent future back pain, do exercises to stretch and strengthen your back and stomach. Learn how to use good posture, safe lifting techniques, and proper body mechanics. When should you call for help? Call your doctor now or seek immediate medical care if:    · You have new or worsening numbness in your legs.     · You have new or worsening weakness in your legs. (This could make it hard to stand up.)     · You lose control of your bladder or bowels.    Watch closely for changes in your health, and be sure to contact your doctor if:    · You have a fever, lose weight, or don't feel well.     · You do not get better as expected. Where can you learn more? Go to http://marissa-marjan.info/. Enter O505 in the search box to learn more about \"Back Pain: Care Instructions. \"  Current as of: September 20, 2018  Content Version: 11.9  © 2048-6180 [employer]. Care instructions adapted under license by [employer] (which disclaims liability or warranty for this information). If you have questions about a medical condition or this instruction, always ask your healthcare professional. [place] [address] any warranty or liability for your use of this information.

## 2019-02-24 NOTE — ED NOTES
Pt presents ambulatory to ED complaining of left shoulder and arm pain since 0100 today. Pt denies injury or trauma. Pt is alert and oriented x 4, RR even and unlabored, skin is warm and dry. Assesment completed and pt updated on plan of care. Emergency Department Nursing Plan of Care       The Nursing Plan of Care is developed from the Nursing assessment and Emergency Department Attending provider initial evaluation. The plan of care may be reviewed in the ED Provider note.     The Plan of Care was developed with the following considerations:   Patient / Family readiness to learn indicated by:verbalized understanding  Persons(s) to be included in education: patient  Barriers to Learning/Limitations:No    Signed     Cristino Nunezr, RN    2/24/2019   3:58 PM

## 2019-02-25 LAB
ATRIAL RATE: 69 BPM
CALCULATED P AXIS, ECG09: 46 DEGREES
CALCULATED R AXIS, ECG10: 13 DEGREES
CALCULATED T AXIS, ECG11: 1 DEGREES
DIAGNOSIS, 93000: NORMAL
P-R INTERVAL, ECG05: 166 MS
Q-T INTERVAL, ECG07: 400 MS
QRS DURATION, ECG06: 96 MS
QTC CALCULATION (BEZET), ECG08: 428 MS
VENTRICULAR RATE, ECG03: 69 BPM

## 2019-02-25 RX ORDER — DICLOFENAC SODIUM 75 MG/1
TABLET, DELAYED RELEASE ORAL
Qty: 60 TAB | Refills: 3 | Status: SHIPPED | OUTPATIENT
Start: 2019-02-25 | End: 2019-11-18

## 2019-02-28 DIAGNOSIS — E11.21 CONTROLLED TYPE 2 DIABETES MELLITUS WITH DIABETIC NEPHROPATHY, WITHOUT LONG-TERM CURRENT USE OF INSULIN (HCC): ICD-10-CM

## 2019-03-03 RX ORDER — POTASSIUM CHLORIDE 20 MEQ/1
TABLET, EXTENDED RELEASE ORAL
Qty: 90 TAB | Refills: 0 | Status: SHIPPED | OUTPATIENT
Start: 2019-03-03 | End: 2019-06-17 | Stop reason: SDUPTHER

## 2019-03-20 DIAGNOSIS — E11.9 CONTROLLED TYPE 2 DIABETES MELLITUS WITHOUT COMPLICATION, WITH LONG-TERM CURRENT USE OF INSULIN (HCC): ICD-10-CM

## 2019-03-20 DIAGNOSIS — Z79.4 CONTROLLED TYPE 2 DIABETES MELLITUS WITHOUT COMPLICATION, WITH LONG-TERM CURRENT USE OF INSULIN (HCC): ICD-10-CM

## 2019-03-26 ENCOUNTER — OFFICE VISIT (OUTPATIENT)
Dept: FAMILY MEDICINE CLINIC | Age: 44
End: 2019-03-26

## 2019-03-26 VITALS
OXYGEN SATURATION: 98 % | BODY MASS INDEX: 40.5 KG/M2 | DIASTOLIC BLOOD PRESSURE: 71 MMHG | TEMPERATURE: 98.4 F | RESPIRATION RATE: 19 BRPM | SYSTOLIC BLOOD PRESSURE: 113 MMHG | HEART RATE: 78 BPM | HEIGHT: 66 IN | WEIGHT: 252 LBS

## 2019-03-26 DIAGNOSIS — Z79.4 CONTROLLED TYPE 2 DIABETES MELLITUS WITHOUT COMPLICATION, WITH LONG-TERM CURRENT USE OF INSULIN (HCC): ICD-10-CM

## 2019-03-26 DIAGNOSIS — Z71.89 ADVANCED DIRECTIVES, COUNSELING/DISCUSSION: ICD-10-CM

## 2019-03-26 DIAGNOSIS — Z00.00 MEDICARE ANNUAL WELLNESS VISIT, SUBSEQUENT: Primary | ICD-10-CM

## 2019-03-26 DIAGNOSIS — E11.9 CONTROLLED TYPE 2 DIABETES MELLITUS WITHOUT COMPLICATION, WITH LONG-TERM CURRENT USE OF INSULIN (HCC): ICD-10-CM

## 2019-03-26 DIAGNOSIS — E78.5 HYPERLIPIDEMIA LDL GOAL <100: ICD-10-CM

## 2019-03-26 DIAGNOSIS — E55.9 VITAMIN D DEFICIENCY: ICD-10-CM

## 2019-03-26 PROBLEM — E11.40 TYPE 2 DIABETES MELLITUS WITH DIABETIC NEUROPATHY (HCC): Status: ACTIVE | Noted: 2019-03-26

## 2019-03-26 LAB — GLUCOSE POC: 198 MG/DL

## 2019-03-26 NOTE — ACP (ADVANCE CARE PLANNING)
Advance Care Planning    Advance Care Planning (ACP) Provider Conversation Snapshot    Date of ACP Conversation: 03/26/19  Persons included in Conversation:  patient and family  Length of ACP Conversation in minutes:  16 minutes    Authorized Decision Maker (if patient is incapable of making informed decisions): This person is: Other Legally Authorized Decision Maker (e.g. Next of Kin)          For Patients with Decision Making Capacity:   Values/Goals: Exploration of values, goals, and preferences if recovery is not expected, even with continued medical treatment in the event of:  Imminent death  Severe, permanent brain injury  \"In these circumstances, what matters most to you? \"  Care focused more on comfort or quality of life. Care focused more on quantity (length) of life.     Conversation Outcomes / Follow-Up Plan:   Completed new Advance Directive

## 2019-03-26 NOTE — PATIENT INSTRUCTIONS
Medicare Wellness Visit, Female     The best way to live healthy is to have a lifestyle where you eat a well-balanced diet, exercise regularly, limit alcohol use, and quit all forms of tobacco/nicotine, if applicable. Regular preventive services are another way to keep healthy. Preventive services (vaccines, screening tests, monitoring & exams) can help personalize your care plan, which helps you manage your own care. Screening tests can find health problems at the earliest stages, when they are easiest to treat. [facility] follows the current, evidence-based guidelines published by the [facility] [employer] ([facility]) when recommending preventive services for our patients. Because we follow these guidelines, sometimes recommendations change over time as research supports it. (For example, mammograms used to be recommended annually. Even though Medicare will still pay for an annual mammogram, the newer guidelines recommend a mammogram every two years for women of average risk.)  Of course, you and your doctor may decide to screen more often for some diseases, based on your risk and your health status. Preventive services for you include:  - Medicare offers their members a free annual wellness visit, which is time for you and your primary care provider to discuss and plan for your preventive service needs. Take advantage of this benefit every year!  -All adults over the age of 72 should receive the recommended pneumonia vaccines. Current USPSTF guidelines recommend a series of two vaccines for the best pneumonia protection.   -All adults should have a flu vaccine yearly and a tetanus vaccine every 10 years. All adults age 61 and older should receive a shingles vaccine once in their lifetime.    -A bone mass density test is recommended when a woman turns 65 to screen for osteoporosis. This test is only recommended one time, as a screening.  Some providers will use this same test as a disease monitoring tool if you already have osteoporosis. -All adults age 38-68 who are overweight should have a diabetes screening test once every three years.   -Other screening tests and preventive services for persons with diabetes include: an eye exam to screen for diabetic retinopathy, a kidney function test, a foot exam, and stricter control over your cholesterol.   -Cardiovascular screening for adults with routine risk involves an electrocardiogram (ECG) at intervals determined by your doctor.   -Colorectal cancer screenings should be done for adults age 54-65 with no increased risk factors for colorectal cancer. There are a number of acceptable methods of screening for this type of cancer. Each test has its own benefits and drawbacks. Discuss with your doctor what is most appropriate for you during your annual wellness visit. The different tests include: colonoscopy (considered the best screening method), a fecal occult blood test, a fecal DNA test, and sigmoidoscopy. -Breast cancer screenings are recommended every other year for women of normal risk, age 54-69.  -Cervical cancer screenings for women over age 72 are only recommended with certain risk factors.   -All adults born between [facility] should be screened once for Hepatitis C.      Here is a list of your current Health Maintenance items (your personalized list of preventive services) with a due date:  Health Maintenance Due   Topic Date Due    Pneumococcal Vaccine (1 of 1 - PPSV23) 09/27/1983    Eye Exam  09/27/1987    Diabetic Foot Care  06/09/2018    Annual Well Visit  06/10/2018

## 2019-03-26 NOTE — PROGRESS NOTES
1. Have you been to the ER, urgent care clinic since your last visit? Hospitalized since your last visit? [facility] on 2/24/19 for chest pain, side pain and shoulder pain. 2. Have you seen or consulted any other health care providers outside of the [address] [place] [address] since your last visit? Include any pap smears or colon screening.  No       Chief Complaint   Patient presents with    Diabetes     f/u

## 2019-03-26 NOTE — PROGRESS NOTES
History     Past Medical History:   Diagnosis Date    Arthritis     lower back    Asthma     last attack     B[facility] 1 disorder [facility] [address]     Chronic neck pain     DDD (degenerative disc disease), cervical     followed by ortho    Diabetes mellitus type 2, controlled ([facility] [address]     Fibromyalgia     GERD (gastroesophageal reflux disease)     High cholesterol     HTN, goal below 140/90     Migraines     Morbid obesity (HCC)     SONY (obstructive sleep apnea)     DOESN'T USE CPAP      Past Surgical History:   Procedure Laterality Date    BREAST SURGERY PROCEDURE UNLISTED  13     RIGHT BREAST EXCISION OF MULTIPLE SEBACEOUS CYSTS    COLONOSCOPY N/A 2019    COLONOSCOPY performed by Toby Miller MD at [address] HX BREAST BIOPSY  2014    right excision- sebaceous cyst    HX  SECTION      HX HEENT      wisdom teeth    HX ORTHOPAEDIC  2017    CERVICAL FUSION    HX OTHER SURGICAL      spinal tap - viral menigitis    HX OTHER SURGICAL      SEBACEOUS CYST-BACK    HX TOTAL LAPAROSCOPIC HYSTERECTOMY W/ BS&O       Current Outpatient Medications   Medication Sig Dispense Refill    potassium chloride (KLOR-CON M20) 20 mEq tablet TAKE 1 TABLET BY MOUTH EVERY DAY 90 Tab 0    diclofenac EC (VOLTAREN) 75 mg EC tablet TAKE 1 TABLET BY MOUTH TWICE A DAY 60 Tab 3    atenolol (TENORMIN) 25 mg tablet TAKE 1 TABLET BY MOUTH EVERY DAY 90 Tab 0    glucose blood VI test strips (ASCENSIA CONTOUR) strip TEST BLOOD SUGAR THREE TIMES DAILY. Dx:E11.9 300 Strip 3    metFORMIN ER (GLUCOPHAGE XR) 500 mg tablet Take 2 Tabs by mouth daily (with dinner). 180 Tab 0    topiramate (TOPAMAX) 25 mg tablet TAKE 1 TABLET TWICE A DAY WITH MEALS 180 Tab 3    NYSTOP powder Apply  to affected area two (2) times a day.  60 g 5    flash glucose sensor (FREESTYLE ROSALINE 10 DAY SENSOR) kit Check blood sugar 3 times daily insulin requiring tyoe 2 diabetes 3 Kit 3    flash glucose scanning reader (FREESTYLE ROSALINE 10 DAY READER) misc Check blood sugar daily insulin requiring type 2 diabetyes 1 Each 11    insulin glargine (LANTUS SOLOSTAR U-100 INSULIN) 100 unit/mL (3 mL) inpn INJECT 30 UNITS SUBCUTANEOUSLY EVERY NIGHT 3 Pen 3    VITAMIN D3 2,000 unit cap capsule TAKE ONE CAPSULE BY MOUTH EVERY DAY 30 Cap 5    LINZESS 145 mcg cap capsule TAKE ONE CAPSULE BY MOUTH EVERY DAY BEFORE BREAKFAST 30 Cap 2    pravastatin (PRAVACHOL) 40 mg tablet TAKE 1 TABLET BY MOUTH EVERY EVENING 90 Tab 3    traZODone (DESYREL) 100 mg tablet take 1 tablet by mouth at bedtime 90 Tab 3    glimepiride (AMARYL) 2 mg tablet TAKE 1 TAB BY MOUTH TWO (2) TIMES A DAY. 180 Tab 3    cyclobenzaprine (FLEXERIL) 10 mg tablet Take 10 mg by mouth two (2) times a day.  polyethylene glycol (MIRALAX) 17 gram packet Take 1 Packet by mouth daily. 30 Each 5    gabapentin (NEURONTIN) 400 mg capsule Take 1 Cap by mouth two (2) times a day.  30 Cap 0    furosemide (LASIX) 40 mg tablet take 1 tablet by mouth once daily 30 Tab 1    montelukast (SINGULAIR) 10 mg tablet take 1 tablet by mouth once daily 30 Tab 6     Allergies   Allergen Reactions    Pcn [Penicillins] Swelling     Swelling throat    Shellfish Containing Products Swelling     Swells throat and eyes    Morphine Other (comments)     FAST HEARTBEAT AND NAUSEA PER PATIENT     Family History   Problem Relation Age of Onset    Cancer Maternal Aunt         OVARIAN;     Cancer Maternal Grandmother         bone cancer    Hypertension Mother     Diabetes Mother     Hypertension Father     Heart Disease Father         CHF    Cancer Paternal Grandmother 79        breast cancer    Breast Cancer Paternal Grandmother     Anesth Problems Neg Hx      Social History     Tobacco Use    Smoking status: Never Smoker    Smokeless tobacco: Never Used   Substance Use Topics    Alcohol use: Yes     Comment: SOCIAL      Patient Active Problem List   Diagnosis Code    Diabetes mellitus type 2, controlled ([facility] [address] E11.9    Arthritis M19.90    Asthma J45.909    Bipolar 1 disorder ([facility] [address] F31.9    GERD (gastroesophageal reflux disease) K21.9    High cholesterol E78.00    HTN, goal below 140/90 I10    Migraines G43.909    Morbid obesity (HCC) E66.01    Unspecified sleep apnea G47.30    DDD (degenerative disc disease), cervical M50.30    Chronic neck pain M54.2, G89.29    SONY (obstructive sleep apnea) G47.33    Cervical stenosis of spine M48.02    Well controlled type 2 diabetes mellitus ([facility] [address] E11.9    DDD (degenerative disc disease), lumbar M51.36    Fibromyalgia M79.7    Type 2 diabetes mellitus with diabetic neuropathy (HCC) E11.40       Depression Risk Factor Screening:     3 most recent PHQ Screens 1/9/2017   Little interest or pleasure in doing things Several days   Feeling down, depressed, irritable, or hopeless Several days   Total Score PHQ 2 2     Alcohol Risk Factor Screening: You do not drink alcohol or very rarely. Functional Ability and Level of Safety:   Hearing Loss  Hearing is good. Activities of Daily Living  The home contains: no safety equipment. Patient does total self care    Fall Risk  Fall Risk Assessment, last 12 mths 1/31/2014   Able to walk? Yes   Fall in past 12 months? No       Abuse Screen  Patient is not abused    Cognitive Screening   Evaluation of Cognitive Function:  Has your family/caregiver stated any concerns about your memory: no  Normal    Patient Care Team   Patient Care Team:  Fide Victor MD as PCP - General (Family Practice)  Too Saavedra MD as Referring Provider (Gynecology)  Sebastián Palma MD (Orthopedic Surgery)  Marcella Goldberg MD as Physician (Sleep Medicine)    Assessment/Plan   Education and counseling provided:  Are appropriate based on today's review and evaluation  End-of-Life planning (with patient's consent)    Diagnoses and all orders for this visit:    1. Medicare annual wellness visit, subsequent    2.  Advanced directives, counseling/discussion  -     ADVANCE CARE PLANNING FIRST 27 MINS              Chief Complaint   Patient presents with    Diabetes     f/u     she is a 41y.o. year old female who presents for evalution. 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     Aspirin yes ____   No____ N/A____    Patient Active Problem List    Diagnosis    Type 2 diabetes mellitus with diabetic neuropathy ([facility] [address]    Fibromyalgia    DDD (degenerative disc disease), lumbar    Well controlled type 2 diabetes mellitus ([facility] [address]    Cervical stenosis of spine    Chronic neck pain    SONY (obstructive sleep apnea)    DDD (degenerative disc disease), cervical     followed by ortho      Arthritis     lower back      Asthma     last attack 1994      Bipolar 1 disorder ([facility] [address]    GERD (gastroesophageal reflux disease)    High cholesterol    HTN, goal below 140/90    Migraines    Morbid obesity ([facility] [address]    Unspecified sleep apnea     no cpap-NEVER TESTED      Diabetes mellitus type 2, controlled ([facility] [address]     a1c 7.4% at outside EMR         Nurse notes were reviewed and copied and are correct  Review of Systems - negative except as listed above in the HPI    Objective:     Vitals:    03/26/19 1009   BP: 113/71   Pulse: 78   Resp: 19   Temp: 98.4 °F (36.9 °C)   TempSrc: Oral   SpO2: 98%   Weight: 252 lb (114.3 kg)   Height: 5' 6\" (1.676 m)     Physical Examination: General appearance - alert, well appearing, and in no distress  Mental status - alert, oriented to person, place, and time  Mouth - mucous membranes moist, pharynx normal without lesions  Chest - clear to auscultation, no wheezes, rales or rhonchi, symmetric air entry  Heart - normal rate, regular rhythm, normal S1, S2, no murmurs, rubs, clicks or gallops  Abdomen - soft, nontender, nondistended, no masses or organomegaly     Sensory exam of the foot is normal, tested with the monofilament. Good pulses, no lesions or ulcers, good peripheral pulses. Assessment/ Plan:   Diagnoses and all orders for this visit:    1. Controlled type 2 diabetes mellitus without complication, with long-term current use of insulin (HCC)  -     AMB POC GLUCOSE BLOOD, BY GLUCOSE MONITORING DEVICE  -     METABOLIC PANEL, COMPREHENSIVE  -     HEMOGLOBIN A1C WITH EAG  -      DIABETES FOOT EXAM    2. Vitamin D deficiency  -     VITAMIN D, 25 HYDROXY    3. Hyperlipidemia LDL goal <100          ICD-10-CM ICD-9-CM    1. Controlled type 2 diabetes mellitus without complication, with long-term current use of insulin (HCC) E11.9 250.00 AMB POC GLUCOSE BLOOD, BY GLUCOSE MONITORING DEVICE    K59.8 W07.70 METABOLIC PANEL, COMPREHENSIVE      HEMOGLOBIN A1C WITH EAG       DIABETES FOOT EXAM   2. Vitamin D deficiency E55.9 268.9 VITAMIN D, 25 HYDROXY   3. Hyperlipidemia LDL goal <100 E78.5 272.4    4. Medicare annual wellness visit, subsequent Z00.00 V70.0    5. Advanced directives, counseling/discussion Z71.89 V65.49 ADVANCE CARE PLANNING FIRST [address] [address] MINS       I have discussed the diagnosis with the patient and the intended plan as seen in the above orders. The patient has received an after-visit summary and questions were answered concerning future plans. Medication Side Effects and Warnings were discussed with patient: yes  Patient Labs were reviewed and or requested: yes  Patient Past Records were reviewed and or requested: yes          There are no preventive care reminders to display for this patient.

## 2019-03-27 LAB
25(OH)D3+25(OH)D2 SERPL-MCNC: 36.4 NG/ML (ref 30–100)
ALBUMIN SERPL-MCNC: 4.3 G/DL (ref 3.5–5.5)
ALBUMIN/GLOB SERPL: 1.5 {RATIO} (ref 1.2–2.2)
ALP SERPL-CCNC: 68 IU/L (ref 39–117)
ALT SERPL-CCNC: 11 IU/L (ref 0–32)
AST SERPL-CCNC: 14 IU/L (ref 0–40)
BILIRUB SERPL-MCNC: 0.2 MG/DL (ref 0–1.2)
BUN SERPL-MCNC: 9 MG/DL (ref 6–24)
BUN/CREAT SERPL: 12 (ref 9–23)
CALCIUM SERPL-MCNC: 9.1 MG/DL (ref 8.7–10.2)
CHLORIDE SERPL-SCNC: 109 MMOL/L (ref 96–106)
CO2 SERPL-SCNC: 16 MMOL/L (ref 20–29)
CREAT SERPL-MCNC: 0.78 MG/DL (ref 0.57–1)
EST. AVERAGE GLUCOSE BLD GHB EST-MCNC: 123 MG/DL
GLOBULIN SER CALC-MCNC: 2.9 G/DL (ref 1.5–4.5)
GLUCOSE SERPL-MCNC: 152 MG/DL (ref 65–99)
HBA1C MFR BLD: 5.9 % (ref 4.8–5.6)
POTASSIUM SERPL-SCNC: 4.1 MMOL/L (ref 3.5–5.2)
PROT SERPL-MCNC: 7.2 G/DL (ref 6–8.5)
SODIUM SERPL-SCNC: 143 MMOL/L (ref 134–144)

## 2019-04-04 NOTE — PROGRESS NOTES
The vit D is normal  The kidney and liver are normal  The blood sugar control is not as good as it weas 4 months ago. Keep working on avoiding sweets and junk foods  Also move as much as you can tolerate. Exercise at least 150 mins a week.  Continue the metformin

## 2019-04-04 NOTE — PROGRESS NOTES
Call placed to all numbers on file for pt but none are working numbers. Letter with results/recommendations sent to pt address on file.

## 2019-04-05 RX ORDER — INSULIN GLARGINE 100 [IU]/ML
INJECTION, SOLUTION SUBCUTANEOUS
Qty: 3 ADJUSTABLE DOSE PRE-FILLED PEN SYRINGE | Refills: 3 | Status: SHIPPED | OUTPATIENT
Start: 2019-04-05 | End: 2019-07-28 | Stop reason: ALTCHOICE

## 2019-04-28 DIAGNOSIS — E11.9 WELL CONTROLLED TYPE 2 DIABETES MELLITUS (HCC): ICD-10-CM

## 2019-04-30 RX ORDER — ATENOLOL 25 MG/1
TABLET ORAL
Qty: 90 TAB | Refills: 0 | Status: SHIPPED | OUTPATIENT
Start: 2019-04-30 | End: 2019-05-02 | Stop reason: SDUPTHER

## 2019-04-30 RX ORDER — GLIMEPIRIDE 2 MG/1
TABLET ORAL
Qty: 180 TAB | Refills: 3 | Status: SHIPPED | OUTPATIENT
Start: 2019-04-30

## 2019-05-03 RX ORDER — ATENOLOL 25 MG/1
TABLET ORAL
Qty: 90 TAB | Refills: 0 | Status: SHIPPED | OUTPATIENT
Start: 2019-05-03 | End: 2019-10-21 | Stop reason: SDUPTHER

## 2019-05-10 DIAGNOSIS — K59.00 CONSTIPATION, UNSPECIFIED CONSTIPATION TYPE: ICD-10-CM

## 2019-05-12 RX ORDER — LINACLOTIDE 145 UG/1
CAPSULE, GELATIN COATED ORAL
Qty: 30 CAP | Refills: 2 | Status: SHIPPED | OUTPATIENT
Start: 2019-05-12 | End: 2019-08-08 | Stop reason: SDUPTHER

## 2019-06-17 DIAGNOSIS — E11.21 CONTROLLED TYPE 2 DIABETES MELLITUS WITH DIABETIC NEPHROPATHY, WITHOUT LONG-TERM CURRENT USE OF INSULIN (HCC): ICD-10-CM

## 2019-06-19 RX ORDER — POTASSIUM CHLORIDE 20 MEQ/1
TABLET, EXTENDED RELEASE ORAL
Qty: 90 TAB | Refills: 0 | Status: SHIPPED | OUTPATIENT
Start: 2019-06-19

## 2019-07-05 DIAGNOSIS — E78.5 HYPERLIPIDEMIA LDL GOAL <100: ICD-10-CM

## 2019-07-06 RX ORDER — PRAVASTATIN SODIUM 40 MG/1
TABLET ORAL
Qty: 90 TAB | Refills: 3 | Status: SHIPPED | OUTPATIENT
Start: 2019-07-06

## 2019-07-23 ENCOUNTER — TELEPHONE (OUTPATIENT)
Dept: FAMILY MEDICINE CLINIC | Age: 44
End: 2019-07-23

## 2019-07-23 NOTE — TELEPHONE ENCOUNTER
Pt insurance prefers Basaglar/Tresiba instead of Lantus. Please send covered alternative to pt pharmacy.

## 2019-07-25 RX ORDER — TRAZODONE HYDROCHLORIDE 100 MG/1
TABLET ORAL
Qty: 90 TAB | Refills: 3 | Status: SHIPPED | OUTPATIENT
Start: 2019-07-25

## 2019-07-28 DIAGNOSIS — E11.21 CONTROLLED TYPE 2 DIABETES MELLITUS WITH DIABETIC NEPHROPATHY, WITHOUT LONG-TERM CURRENT USE OF INSULIN (HCC): Primary | ICD-10-CM

## 2019-07-28 RX ORDER — INSULIN GLARGINE 100 [IU]/ML
30 INJECTION, SOLUTION SUBCUTANEOUS DAILY
Qty: 15 ML | Refills: 3 | Status: SHIPPED | OUTPATIENT
Start: 2019-07-28

## 2019-07-29 DIAGNOSIS — K59.01 SLOW TRANSIT CONSTIPATION: ICD-10-CM

## 2019-08-01 RX ORDER — POLYETHYLENE GLYCOL 3350 17 G/17G
POWDER, FOR SOLUTION ORAL
Qty: 28 PACKET | Refills: 5 | Status: SHIPPED | OUTPATIENT
Start: 2019-08-01

## 2019-08-08 DIAGNOSIS — K59.00 CONSTIPATION, UNSPECIFIED CONSTIPATION TYPE: ICD-10-CM

## 2019-08-10 RX ORDER — LINACLOTIDE 145 UG/1
CAPSULE, GELATIN COATED ORAL
Qty: 30 CAP | Refills: 2 | Status: SHIPPED | OUTPATIENT
Start: 2019-08-10 | End: 2019-12-12 | Stop reason: SDUPTHER

## 2019-11-18 ENCOUNTER — HOSPITAL ENCOUNTER (EMERGENCY)
Age: 44
Discharge: HOME | End: 2019-11-18
Attending: EMERGENCY MEDICINE
Payer: MEDICARE

## 2019-11-18 ENCOUNTER — APPOINTMENT (OUTPATIENT)
Dept: GENERAL RADIOLOGY | Age: 44
End: 2019-11-18
Attending: PHYSICIAN ASSISTANT
Payer: MEDICARE

## 2019-11-18 VITALS
SYSTOLIC BLOOD PRESSURE: 134 MMHG | RESPIRATION RATE: 18 BRPM | HEIGHT: 66 IN | TEMPERATURE: 98.1 F | DIASTOLIC BLOOD PRESSURE: 78 MMHG | WEIGHT: 248 LBS | HEART RATE: 81 BPM | OXYGEN SATURATION: 98 % | BODY MASS INDEX: 39.86 KG/M2

## 2019-11-18 DIAGNOSIS — N30.00 ACUTE CYSTITIS WITHOUT HEMATURIA: ICD-10-CM

## 2019-11-18 DIAGNOSIS — M54.2 CERVICALGIA: ICD-10-CM

## 2019-11-18 DIAGNOSIS — M54.50 CHRONIC MIDLINE LOW BACK PAIN WITHOUT SCIATICA: Primary | ICD-10-CM

## 2019-11-18 DIAGNOSIS — G89.29 CHRONIC MIDLINE LOW BACK PAIN WITHOUT SCIATICA: Primary | ICD-10-CM

## 2019-11-18 LAB
ALBUMIN SERPL-MCNC: 3.5 G/DL (ref 3.5–5)
ALBUMIN/GLOB SERPL: 0.8 {RATIO} (ref 1.1–2.2)
ALP SERPL-CCNC: 67 U/L (ref 45–117)
ALT SERPL-CCNC: 19 U/L (ref 12–78)
ANION GAP SERPL CALC-SCNC: 12 MMOL/L (ref 5–15)
APPEARANCE UR: CLEAR
AST SERPL-CCNC: 17 U/L (ref 15–37)
BACTERIA URNS QL MICRO: ABNORMAL /HPF
BASOPHILS # BLD: 0 K/UL (ref 0–0.1)
BASOPHILS NFR BLD: 0 % (ref 0–1)
BILIRUB SERPL-MCNC: 0.4 MG/DL (ref 0.2–1)
BILIRUB UR QL: NEGATIVE
BUN SERPL-MCNC: 12 MG/DL (ref 6–20)
BUN/CREAT SERPL: 14 (ref 12–20)
CALCIUM SERPL-MCNC: 9 MG/DL (ref 8.5–10.1)
CHLORIDE SERPL-SCNC: 105 MMOL/L (ref 97–108)
CO2 SERPL-SCNC: 22 MMOL/L (ref 21–32)
COLOR UR: ABNORMAL
CREAT SERPL-MCNC: 0.85 MG/DL (ref 0.55–1.02)
DIFFERENTIAL METHOD BLD: NORMAL
EOSINOPHIL # BLD: 0.1 K/UL (ref 0–0.4)
EOSINOPHIL NFR BLD: 1 % (ref 0–7)
EPITH CASTS URNS QL MICRO: ABNORMAL /LPF
ERYTHROCYTE [DISTWIDTH] IN BLOOD BY AUTOMATED COUNT: 12.2 % (ref 11.5–14.5)
GLOBULIN SER CALC-MCNC: 4.2 G/DL (ref 2–4)
GLUCOSE SERPL-MCNC: 96 MG/DL (ref 65–100)
GLUCOSE UR STRIP.AUTO-MCNC: NEGATIVE MG/DL
HCT VFR BLD AUTO: 37 % (ref 35–47)
HGB BLD-MCNC: 12.2 G/DL (ref 11.5–16)
HGB UR QL STRIP: NEGATIVE
IMM GRANULOCYTES # BLD AUTO: 0 K/UL (ref 0–0.04)
IMM GRANULOCYTES NFR BLD AUTO: 0 % (ref 0–0.5)
KETONES UR QL STRIP.AUTO: NEGATIVE MG/DL
LEUKOCYTE ESTERASE UR QL STRIP.AUTO: NEGATIVE
LYMPHOCYTES # BLD: 2.8 K/UL (ref 0.8–3.5)
LYMPHOCYTES NFR BLD: 31 % (ref 12–49)
MCH RBC QN AUTO: 28.5 PG (ref 26–34)
MCHC RBC AUTO-ENTMCNC: 33 G/DL (ref 30–36.5)
MCV RBC AUTO: 86.4 FL (ref 80–99)
MONOCYTES # BLD: 0.8 K/UL (ref 0–1)
MONOCYTES NFR BLD: 8 % (ref 5–13)
NEUTS SEG # BLD: 5.5 K/UL (ref 1.8–8)
NEUTS SEG NFR BLD: 60 % (ref 32–75)
NITRITE UR QL STRIP.AUTO: NEGATIVE
NRBC # BLD: 0 K/UL (ref 0–0.01)
NRBC BLD-RTO: 0 PER 100 WBC
PH UR STRIP: 6 [PH] (ref 5–8)
PLATELET # BLD AUTO: 295 K/UL (ref 150–400)
PMV BLD AUTO: 9.7 FL (ref 8.9–12.9)
POTASSIUM SERPL-SCNC: 4.2 MMOL/L (ref 3.5–5.1)
PROT SERPL-MCNC: 7.7 G/DL (ref 6.4–8.2)
PROT UR STRIP-MCNC: NEGATIVE MG/DL
RBC # BLD AUTO: 4.28 M/UL (ref 3.8–5.2)
RBC #/AREA URNS HPF: ABNORMAL /HPF (ref 0–5)
SODIUM SERPL-SCNC: 139 MMOL/L (ref 136–145)
SP GR UR REFRACTOMETRY: 1.02 (ref 1–1.03)
UA: UC IF INDICATED,UAUC: ABNORMAL
UROBILINOGEN UR QL STRIP.AUTO: 0.2 EU/DL (ref 0.2–1)
WBC # BLD AUTO: 9.3 K/UL (ref 3.6–11)
WBC URNS QL MICRO: ABNORMAL /HPF (ref 0–4)

## 2019-11-18 PROCEDURE — 36415 COLL VENOUS BLD VENIPUNCTURE: CPT

## 2019-11-18 PROCEDURE — 96374 THER/PROPH/DIAG INJ IV PUSH: CPT

## 2019-11-18 PROCEDURE — 87086 URINE CULTURE/COLONY COUNT: CPT

## 2019-11-18 PROCEDURE — 85025 COMPLETE CBC W/AUTO DIFF WBC: CPT

## 2019-11-18 PROCEDURE — 81001 URINALYSIS AUTO W/SCOPE: CPT

## 2019-11-18 PROCEDURE — 74018 RADEX ABDOMEN 1 VIEW: CPT

## 2019-11-18 PROCEDURE — 74011250636 HC RX REV CODE- 250/636: Performed by: PHYSICIAN ASSISTANT

## 2019-11-18 PROCEDURE — 80053 COMPREHEN METABOLIC PANEL: CPT

## 2019-11-18 PROCEDURE — 72050 X-RAY EXAM NECK SPINE 4/5VWS: CPT

## 2019-11-18 PROCEDURE — 99283 EMERGENCY DEPT VISIT LOW MDM: CPT

## 2019-11-18 PROCEDURE — 74011250637 HC RX REV CODE- 250/637: Performed by: PHYSICIAN ASSISTANT

## 2019-11-18 RX ORDER — CYCLOBENZAPRINE HCL 10 MG
10 TABLET ORAL
Status: COMPLETED | OUTPATIENT
Start: 2019-11-18 | End: 2019-11-18

## 2019-11-18 RX ORDER — CYCLOBENZAPRINE HCL 10 MG
10 TABLET ORAL
Qty: 15 TAB | Refills: 0 | Status: SHIPPED | OUTPATIENT
Start: 2019-11-18 | End: 2020-10-23

## 2019-11-18 RX ORDER — NAPROXEN 500 MG/1
500 TABLET ORAL
Qty: 20 TAB | Refills: 0 | Status: SHIPPED | OUTPATIENT
Start: 2019-11-18 | End: 2020-10-23

## 2019-11-18 RX ORDER — NITROFURANTOIN 25; 75 MG/1; MG/1
100 CAPSULE ORAL 2 TIMES DAILY
Qty: 14 CAP | Refills: 0 | Status: SHIPPED | OUTPATIENT
Start: 2019-11-18 | End: 2019-11-25

## 2019-11-18 RX ORDER — CLONAZEPAM 0.5 MG/1
0.5 TABLET ORAL
COMMUNITY

## 2019-11-18 RX ORDER — KETOROLAC TROMETHAMINE 30 MG/ML
30 INJECTION, SOLUTION INTRAMUSCULAR; INTRAVENOUS
Status: COMPLETED | OUTPATIENT
Start: 2019-11-18 | End: 2019-11-18

## 2019-11-18 RX ADMIN — KETOROLAC TROMETHAMINE 30 MG: 30 INJECTION, SOLUTION INTRAMUSCULAR; INTRAVENOUS at 13:47

## 2019-11-18 RX ADMIN — CYCLOBENZAPRINE HYDROCHLORIDE 10 MG: 10 TABLET, FILM COATED ORAL at 13:47

## 2019-11-18 NOTE — DISCHARGE INSTRUCTIONS
Patient Education        Learning About Relief for Back Pain  What is back tension and strain? Back strain happens when you overstretch, or pull, a muscle in your back. You may hurt your back in an accident or when you exercise or lift something. Most back pain will get better with rest and time. You can take care of yourself at home to help your back heal.  What can you do first to relieve back pain? When you first feel back pain, try these steps:  · Walk. Take a short walk (10 to 20 minutes) on a level surface (no slopes, hills, or stairs) every 2 to 3 hours. Walk only distances you can manage without pain, especially leg pain. · Relax. Find a comfortable position for rest. Some people are comfortable on the floor or a medium-firm bed with a small pillow under their head and another under their knees. Some people prefer to lie on their side with a pillow between their knees. Don't stay in one position for too long. · Try heat or ice. Try using a heating pad on a low or medium setting, or take a warm shower, for 15 to 20 minutes every 2 to 3 hours. Or you can buy single-use heat wraps that last up to 8 hours. You can also try an ice pack for 10 to 15 minutes every 2 to 3 hours. You can use an ice pack or a bag of frozen vegetables wrapped in a thin towel. There is not strong evidence that either heat or ice will help, but you can try them to see if they help. You may also want to try switching between heat and cold. · Take pain medicine exactly as directed. ¨ If the doctor gave you a prescription medicine for pain, take it as prescribed. ¨ If you are not taking a prescription pain medicine, ask your doctor if you can take an over-the-counter medicine. What else can you do? · Stretch and exercise. Exercises that increase flexibility may relieve your pain and make it easier for your muscles to keep your spine in a good, neutral position. And don't forget to keep walking. · Do self-massage.  You can use self-massage to unwind after work or school or to energize yourself in the morning. You can easily massage your feet, hands, or neck. Self-massage works best if you are in comfortable clothes and are sitting or lying in a comfortable position. Use oil or lotion to massage bare skin. · Reduce stress. Back pain can lead to a vicious [place]: Distress about the pain tenses the muscles in your back, which in turn causes more pain. Learn how to relax your mind and your muscles to lower your stress. Where can you learn more? Go to http://marissa-marjan.info/. Enter C313 in the search box to learn more about \"Learning About Relief for Back Pain. \"  Current as of: March 21, 2017  Content Version: 11.5  © 6653-2131 [employer]. Care instructions adapted under license by [employer] (which disclaims liability or warranty for this information). If you have questions about a medical condition or this instruction, always ask your healthcare professional. [address] any warranty or liability for your use of this information.

## 2019-11-18 NOTE — ED NOTES
Pt reports chronic back pain due to cervical and lumbar surgeries; pt reports left, lower abdominal pain that radiates to back; pt denies any injury, denies urinary symptoms; pt also reports neck pain; denies relief with OTC meds; pt states that the back pain is typical with chronic pain but the abdominal pain is new      Emergency Department Nursing Plan of Care       The Nursing Plan of Care is developed from the Nursing assessment and Emergency Department Attending provider initial evaluation. The plan of care may be reviewed in the ED Provider note.     The Plan of Care was developed with the following considerations:   Patient / Family readiness to learn indicated by:verbalized understanding  Persons(s) to be included in education: patient  Barriers to Learning/Limitations:No    Signed     Margarito Jean-Baptiste RN    11/18/2019   1:15 PM

## 2019-11-18 NOTE — ED PROVIDER NOTES
EMERGENCY DEPARTMENT HISTORY AND PHYSICAL EXAM      Date: 11/18/2019  Patient Name: Perico Walter    History of Presenting Illness     Chief Complaint   Patient presents with    Flank Pain     x 2-3 days. History Provided By: Patient    HPI: Perico Walter, 45 y.o. female with morbid obesity, chronic low back pain and arthritis, asthma, GERD, high cholesterol, bipolar 1 disorder, migraine headaches, diabetes, hypertension, degenerative disc disease, chronic neck pain, obstructive sleep apnea, fibromyalgia, cervical fusion 2017 and lumbar spine surgery 2017 who presents ambulatory to the ED with cc of acute on chronic worsening bilateral low back pain with new onset left lower quadrant and left flank pain X 1 month. Patient additionally endorses bilateral posterior neck pain radiating to bilateral clavicles; states that this is chronic since procedure in 2017. Patient endorses stiffness in her spine. Patient endorses that she did move to a new house in the [address] [facility] area approximately 1 month prior to arrival.  States that she thought that her increased pain may have been from the move, but her symptoms have persisted over the last month. Denies any injuries or trauma. Endorses chronic constipation. Denies melena, hematochezia, diarrhea, dysuria, frequency, urgency, hematuria, fever, chills, nausea, vomiting, chest pain, shortness of breath, numbness, tingling, focal weakness, incontinence, saddle paresthesias, headache, lightheadedness, dizziness. Patient endorses taking over-the-counter Tylenol and Motrin without relief. Denies taking medications today. Patient additionally endorses following up with her psychiatrist and endorsing her symptoms, states that she was informed to try CBD oil; no relief with this treatment. PCP: Hipolito Zuniga MD    There are no other complaints, changes, or physical findings at this time. No current facility-administered medications on file prior to encounter. Current Outpatient Medications on File Prior to Encounter   Medication Sig Dispense Refill    clonazePAM (KLONOPIN) 0.5 mg tablet Take 0.5 mg by mouth nightly as needed.  atenolol (TENORMIN) 25 mg tablet TAKE 1 TABLET BY MOUTH EVERY DAY 90 Tab 0    metFORMIN ER (GLUCOPHAGE XR) 500 mg tablet TAKE 2 TABS BY MOUTH DAILY (WITH DINNER). 180 Tab 0    cholecalciferol (VITAMIN D3) 2,000 unit cap capsule TAKE ONE CAPSULE BY MOUTH EVERY DAY 30 Cap 2    insulin glargine (LANTUS,BASAGLAR) 100 unit/mL (3 mL) inpn 30 Units by SubCUTAneous route daily. 15 mL 3    traZODone (DESYREL) 100 mg tablet TAKE 1 TABLET BY MOUTH EVERY DAY AT BEDTIME 90 Tab 3    pravastatin (PRAVACHOL) 40 mg tablet TAKE 1 TABLET BY MOUTH EVERY EVENING 90 Tab 3    potassium chloride (KLOR-CON M20) 20 mEq tablet TAKE 1 TABLET BY MOUTH EVERY DAY 90 Tab 0    glimepiride (AMARYL) 2 mg tablet TAKE 1 TABLET BY MOUTH TWICE A  Tab 3    topiramate (TOPAMAX) 25 mg tablet TAKE 1 TABLET TWICE A DAY WITH MEALS 180 Tab 3    montelukast (SINGULAIR) 10 mg tablet take 1 tablet by mouth once daily 30 Tab 6    LINZESS 145 mcg cap capsule TAKE ONE CAPSULE BY MOUTH EVERY DAY BEFORE BREAKFAST 30 Cap 2    polyethylene glycol (MIRALAX) 17 gram packet MIX AND TAKE 1 PACKET BY MOUTH EVERY DAY 28 Packet 5    [DISCONTINUED] diclofenac EC (VOLTAREN) 75 mg EC tablet TAKE 1 TABLET BY MOUTH TWICE A DAY 60 Tab 3    glucose blood VI test strips (ASCENSIA CONTOUR) strip TEST BLOOD SUGAR THREE TIMES DAILY. Dx:E11.9 300 Strip 3    NYSTOP powder Apply  to affected area two (2) times a day.  60 g 5    flash glucose scanning reader (FREESTYLE ROSALINE 10 DAY READER) misc Check blood sugar daily insulin requiring type 2 diabetyes 1 Each 11    [DISCONTINUED] flash glucose sensor (FREESTYLE ROSALINE 10 DAY SENSOR) kit Check blood sugar 3 times daily insulin requiring tyoe 2 diabetes 3 Kit 3    [DISCONTINUED] cyclobenzaprine (FLEXERIL) 10 mg tablet Take 10 mg by mouth two (2) times a day.  [DISCONTINUED] gabapentin (NEURONTIN) 400 mg capsule Take 1 Cap by mouth two (2) times a day. 30 Cap 0    [DISCONTINUED] furosemide (LASIX) 40 mg tablet take 1 tablet by mouth once daily 30 Tab 1     Past History     Past Medical History:  Past Medical History:   Diagnosis Date    Arthritis     lower back    Asthma     last attack     Bipolar 1 disorder (HCC)     Chronic neck pain     DDD (degenerative disc disease), cervical     followed by ortho    Diabetes mellitus type 2, controlled ([address]     Fibromyalgia     GERD (gastroesophageal reflux disease)     High cholesterol     HTN, goal below 140/90     Migraines     Morbid obesity (HCC)     SONY (obstructive sleep apnea)     DOESN'T USE CPAP     Past Surgical History:  Past Surgical History:   Procedure Laterality Date    BREAST SURGERY PROCEDURE UNLISTED  13     RIGHT BREAST EXCISION OF MULTIPLE SEBACEOUS CYSTS    COLONOSCOPY N/A 2019    COLONOSCOPY performed by Faye Carpenter [facility] at [facility] ENDOSCOPY    HX BREAST BIOPSY  2014    right excision- sebaceous cyst    HX  SECTION      HX HEENT      wisdom teeth    HX ORTHOPAEDIC  2017    CERVICAL FUSION    HX OTHER SURGICAL      spinal tap - viral menigitis    HX OTHER SURGICAL      SEBACEOUS CYST-BACK    HX TOTAL LAPAROSCOPIC HYSTERECTOMY W/ BS&O       Family History:  Family History   Problem Relation Age of Onset    Cancer Maternal Aunt         OVARIAN;     Cancer Maternal Grandmother         bone cancer    Hypertension Mother     Diabetes Mother     Hypertension Father     Heart Disease Father         CHF    Cancer Paternal Grandmother 79        breast cancer    Breast Cancer Paternal Grandmother     Anesth Problems Neg Hx      Social History:  Social History     Tobacco Use    Smoking status: Never Smoker    Smokeless tobacco: Never Used   Substance Use Topics    Alcohol use: Yes     Comment: SOCIAL     Drug use:  No Allergies: Allergies   Allergen Reactions    Pcn [Penicillins] Swelling     Swelling throat    Shellfish Containing Products Swelling     Swells throat and eyes    Morphine Other (comments)     FAST HEARTBEAT AND NAUSEA PER PATIENT     Review of Systems   Review of Systems   Constitutional: Negative for activity change, appetite change, chills, diaphoresis, fatigue and fever. HENT: Negative for congestion, rhinorrhea, sore throat and trouble swallowing. Eyes: Negative for photophobia. Respiratory: Negative for cough, chest tightness, shortness of breath and wheezing. Cardiovascular: Negative for chest pain, palpitations and leg swelling. Gastrointestinal: Positive for abdominal pain and constipation. Negative for diarrhea, nausea and vomiting. Genitourinary: Positive for flank pain. Negative for difficulty urinating, dysuria, frequency, hematuria, menstrual problem, pelvic pain and vaginal bleeding. Musculoskeletal: Positive for back pain, neck pain and neck stiffness. Negative for arthralgias, gait problem and myalgias. Skin: Negative for rash. Neurological: Negative for dizziness, seizures, syncope, weakness, light-headedness and headaches. Physical Exam   Physical Exam   Constitutional: She is oriented to person, place, and time. She appears well-developed and well-nourished. No distress. Obese -American female lying semisupine in no apparent distress. HENT:   Head: Normocephalic and atraumatic. Right Ear: External ear normal.   Left Ear: External ear normal.   Nose: Nose normal.   Eyes: Pupils are equal, round, and reactive to light. Conjunctivae and EOM are normal.   Neck: Normal range of motion and phonation normal. Neck supple. No spinous process tenderness and no muscular tenderness present. No neck rigidity. No edema, no erythema and normal range of motion present. Cardiovascular: Normal rate, regular rhythm, normal heart sounds and intact distal pulses. Pulmonary/Chest: Effort normal and breath sounds normal. No respiratory distress. Abdominal: Soft. Bowel sounds are normal. She exhibits no mass. There is no tenderness. There is no rigidity, no rebound, no guarding, no CVA tenderness, no tenderness at McBurney's point and negative Silva's sign. Musculoskeletal: Normal range of motion. She exhibits tenderness. She exhibits no edema. Cervical back: She exhibits pain. She exhibits normal range of motion, no tenderness, no bony tenderness, no swelling, no edema, no deformity, no laceration, no spasm and normal pulse. Thoracic back: Normal.        Lumbar back: She exhibits pain. She exhibits normal range of motion, no tenderness, no bony tenderness, no swelling, no edema, no deformity, no laceration, no spasm and normal pulse. Well-healed vertical midline lumbar spine surgical scar. No spinal crepitus, deformity, step-off, edema, tenderness to palpation. Full range of motion. Neurovascular intact. Neurological: She is alert and oriented to person, place, and time. She has normal strength. She is not disoriented. No cranial nerve deficit or sensory deficit. GCS eye subscore is 4. GCS verbal subscore is 5. GCS motor subscore is 6. Skin: Skin is warm and dry. She is not diaphoretic. Psychiatric: She has a normal mood and affect. Her behavior is normal. Judgment and thought content normal.   Nursing note and vitals reviewed.     Diagnostic Study Results   Labs -     Recent Results (from the past 12 hour(s))   URINALYSIS W/ REFLEX CULTURE    Collection Time: 11/18/19  1:43 PM   Result Value Ref Range    Color YELLOW/STRAW      Appearance CLEAR CLEAR      Specific gravity 1.025 1.003 - 1.030      pH (UA) 6.0 5.0 - 8.0      Protein NEGATIVE  NEG mg/dL    Glucose NEGATIVE  NEG mg/dL    Ketone NEGATIVE  NEG mg/dL    Bilirubin NEGATIVE  NEG      Blood NEGATIVE  NEG      Urobilinogen 0.2 0.2 - 1.0 EU/dL    Nitrites NEGATIVE  NEG      Leukocyte Esterase NEGATIVE  NEG      WBC 0-4 0 - 4 /hpf    RBC 0-5 0 - 5 /hpf    Epithelial cells FEW FEW /lpf    Bacteria 1+ (A) NEG /hpf    UA:UC IF INDICATED URINE CULTURE ORDERED (A) CNI     CBC WITH AUTOMATED DIFF    Collection Time: 11/18/19  1:43 PM   Result Value Ref Range    WBC 9.3 3.6 - 11.0 K/uL    RBC 4.28 3.80 - 5.20 M/uL    HGB 12.2 11.5 - 16.0 g/dL    HCT 37.0 35.0 - 47.0 %    MCV 86.4 80.0 - 99.0 FL    MCH 28.5 26.0 - 34.0 PG    MCHC 33.0 30.0 - 36.5 g/dL    RDW 12.2 11.5 - 14.5 %    PLATELET 948 484 - 195 K/uL    MPV 9.7 8.9 - 12.9 FL    NRBC 0.0 0  WBC    ABSOLUTE NRBC 0.00 0.00 - 0.01 K/uL    NEUTROPHILS 60 32 - 75 %    LYMPHOCYTES 31 12 - 49 %    MONOCYTES 8 5 - 13 %    EOSINOPHILS 1 0 - 7 %    BASOPHILS 0 0 - 1 %    IMMATURE GRANULOCYTES 0 0.0 - 0.5 %    ABS. NEUTROPHILS 5.5 1.8 - 8.0 K/UL    ABS. LYMPHOCYTES 2.8 0.8 - 3.5 K/UL    ABS. MONOCYTES 0.8 0.0 - 1.0 K/UL    ABS. EOSINOPHILS 0.1 0.0 - 0.4 K/UL    ABS. BASOPHILS 0.0 0.0 - 0.1 K/UL    ABS. IMM. GRANS. 0.0 0.00 - 0.04 K/UL    DF AUTOMATED     METABOLIC PANEL, COMPREHENSIVE    Collection Time: 11/18/19  1:43 PM   Result Value Ref Range    Sodium 139 136 - 145 mmol/L    Potassium 4.2 3.5 - 5.1 mmol/L    Chloride 105 97 - 108 mmol/L    CO2 22 21 - 32 mmol/L    Anion gap 12 5 - 15 mmol/L    Glucose 96 65 - 100 mg/dL    BUN 12 6 - 20 MG/DL    Creatinine 0.85 0.55 - 1.02 MG/DL    BUN/Creatinine ratio 14 12 - 20      GFR est AA >60 >60 ml/min/1.73m2    GFR est non-AA >60 >60 ml/min/1.73m2    Calcium 9.0 8.5 - 10.1 MG/DL    Bilirubin, total 0.4 0.2 - 1.0 MG/DL    ALT (SGPT) 19 12 - 78 U/L    AST (SGOT) 17 15 - 37 U/L    Alk. phosphatase 67 45 - 117 U/L    Protein, total 7.7 6.4 - 8.2 g/dL    Albumin 3.5 3.5 - 5.0 g/dL    Globulin 4.2 (H) 2.0 - 4.0 g/dL    A-G Ratio 0.8 (L) 1.1 - 2.2         Radiologic Studies -   XR SPINE CERV 4 OR 5 V   Final Result   IMPRESSION: No acute fracture or subluxation.          XR ABD (KUB)   Final Result   IMPRESSION: Nonobstructive bowel gas pattern. Xr Spine Cerv 4 Or 5 V    Result Date: 11/18/2019  IMPRESSION: No acute fracture or subluxation. Xr Abd (kub)    Result Date: 11/18/2019  IMPRESSION: Nonobstructive bowel gas pattern. Medical Decision Making   I am the first provider for this patient. I reviewed the vital signs, available nursing notes, past medical history, past surgical history, family history and social history. Vital Signs-Reviewed the patient's vital signs. Patient Vitals for the past 12 hrs:   Temp Pulse Resp BP SpO2   11/18/19 1424 -- 81 18 134/78 98 %   11/18/19 1304 98.1 °F (36.7 °C) 80 18 133/78 99 %     Pulse Oximetry Analysis - 99% on RA    Records Reviewed: Nursing Notes, Old Medical Records, Previous Radiology Studies and Previous Laboratory Studies    Provider Notes (Medical Decision Making):   Patient presents with acute on chronic lumbar and cervical spine pain w/ new onset left flank pain. DDx: osteoarthritis, renal stone, pyelonephritis, muscular strain, testicular/ovarian pathology. Unlikely AAA given the story. Will get cbc, cmp, UA to evaluate for blood and infection and possibly imaging to evaluate for hydro. ED Course:   Initial assessment performed. The patients presenting problems have been discussed, and they are in agreement with the care plan formulated and outlined with them. I have encouraged them to ask questions as they arise throughout their visit. Progress Note:   Updated pt on all returned results and findings. Discussed the importance of proper follow up as referred below along with return precautions. Pt in agreement with the care plan and expresses agreement with and understanding of all items discussed. Disposition:  2:42 PM  I have discussed with patient their diagnosis, treatment, and follow up plan. The patient agrees to follow up as outlined in discharge paperwork and also to return to the ED with any worsening.  Dulce Robles Nathaniel Mast PA-C      PLAN:  1. Current Discharge Medication List      START taking these medications    Details   nitrofurantoin, macrocrystal-monohydrate, (MACROBID) 100 mg capsule Take 1 Cap by mouth two (2) times a day for 7 days. Qty: 14 Cap, Refills: 0      naproxen (NAPROSYN) 500 mg tablet Take 1 Tab by mouth two (2) times daily as needed for Pain. Qty: 20 Tab, Refills: 0      cyclobenzaprine (FLEXERIL) 10 mg tablet Take 1 Tab by mouth three (3) times daily as needed for Muscle Spasm(s). Qty: 15 Tab, Refills: 0         CONTINUE these medications which have NOT CHANGED    Details   clonazePAM (KLONOPIN) 0.5 mg tablet Take 0.5 mg by mouth nightly as needed. atenolol (TENORMIN) 25 mg tablet TAKE 1 TABLET BY MOUTH EVERY DAY  Qty: 90 Tab, Refills: 0      metFORMIN ER (GLUCOPHAGE XR) 500 mg tablet TAKE 2 TABS BY MOUTH DAILY (WITH DINNER). Qty: 180 Tab, Refills: 0    Associated Diagnoses: Controlled type 2 diabetes mellitus with diabetic nephropathy, without long-term current use of insulin ([facility])      cholecalciferol (VITAMIN D3) 2,000 unit cap capsule TAKE ONE CAPSULE BY MOUTH EVERY DAY  Qty: 30 Cap, Refills: 2    Associated Diagnoses: Vitamin D deficiency      insulin glargine (LANTUS,BASAGLAR) 100 unit/mL (3 mL) inpn 30 Units by SubCUTAneous route daily.   Qty: 15 mL, Refills: 3    Associated Diagnoses: Controlled type 2 diabetes mellitus with diabetic nephropathy, without long-term current use of insulin ([facility])      traZODone (DESYREL) 100 mg tablet TAKE 1 TABLET BY MOUTH EVERY DAY AT BEDTIME  Qty: 90 Tab, Refills: 3    Comments: PT IS REQUESTING REFILLS      pravastatin (PRAVACHOL) 40 mg tablet TAKE 1 TABLET BY MOUTH EVERY EVENING  Qty: 90 Tab, Refills: 3    Associated Diagnoses: Hyperlipidemia LDL goal <100      potassium chloride (KLOR-CON M20) 20 mEq tablet TAKE 1 TABLET BY MOUTH EVERY DAY  Qty: 90 Tab, Refills: 0    Associated Diagnoses: Controlled type 2 diabetes mellitus with diabetic nephropathy, without long-term current use of insulin ([facility])      glimepiride (AMARYL) 2 mg tablet TAKE 1 TABLET BY MOUTH TWICE A DAY  Qty: 180 Tab, Refills: 3    Associated Diagnoses: Well controlled type 2 diabetes mellitus (HCC)      topiramate (TOPAMAX) 25 mg tablet TAKE 1 TABLET TWICE A DAY WITH MEALS  Qty: 180 Tab, Refills: 3    Associated Diagnoses: Obesity (BMI 30-39.9)      montelukast (SINGULAIR) 10 mg tablet take 1 tablet by mouth once daily  Qty: 30 Tab, Refills: 6      LINZESS 145 mcg cap capsule TAKE ONE CAPSULE BY MOUTH EVERY DAY BEFORE BREAKFAST  Qty: 30 Cap, Refills: 2    Associated Diagnoses: Constipation, unspecified constipation type      polyethylene glycol (MIRALAX) 17 gram packet MIX AND TAKE 1 PACKET BY MOUTH EVERY DAY  Qty: 28 Packet, Refills: 5    Associated Diagnoses: Slow transit constipation      glucose blood VI test strips (ASCENSIA CONTOUR) strip TEST BLOOD SUGAR THREE TIMES DAILY. Dx:E11.9  Qty: 300 Strip, Refills: 3    Associated Diagnoses: Controlled type 2 diabetes mellitus with diabetic nephropathy, without long-term current use of insulin ([facility])      NYSTOP powder Apply  to affected area two (2) times a day. Qty: 60 g, Refills: 5      flash glucose scanning reader (FREESTYLE ROSALINE 10 DAY READER) misc Check blood sugar daily insulin requiring type 2 diabetyes  Qty: 1 Each, Refills: 11    Associated Diagnoses: Controlled type 2 diabetes mellitus without complication, with long-term current use of insulin ([address]           2. Follow-up Information     Follow up With Specialties Details Why Contact Info    Diane Pemberton MD [facility] Schedule an appointment as soon as possible for a visit in 1 week As needed, If symptoms worsen [address]  [phone]          Return to ED if worse     Diagnosis     Clinical Impression:   1. Chronic midline low back pain without sciatica    2. Cervicalgia    3.  Acute cystitis without hematuria            Please note that this dictation was completed with Dragon, computer voice recognition software. Quite often unanticipated grammatical, syntax, homophones, and other interpretive errors are inadvertently transcribed by the computer software. Please disregard these errors. Additionally, please excuse any errors that have escaped final proofreading.

## 2019-11-18 NOTE — ED NOTES
Patient given copy of discharge instructions and 3 electronic script(s). Patient given a current medication reconciliation form and verbalized understanding of their medications and importance of discussing medications at follow-up. Patient stable at discharge. Ambulatory out of ED with .

## 2019-11-20 LAB
BACTERIA SPEC CULT: NORMAL
CC UR VC: NORMAL
SERVICE CMNT-IMP: NORMAL

## 2019-12-12 DIAGNOSIS — K59.00 CONSTIPATION, UNSPECIFIED CONSTIPATION TYPE: ICD-10-CM

## 2019-12-13 RX ORDER — LINACLOTIDE 145 UG/1
CAPSULE, GELATIN COATED ORAL
Qty: 30 CAP | Refills: 2 | Status: SHIPPED | OUTPATIENT
Start: 2019-12-13

## 2020-01-02 ENCOUNTER — HOSPITAL ENCOUNTER (OUTPATIENT)
Dept: MRI IMAGING | Age: 45
Discharge: HOME | End: 2020-01-02
Attending: SPECIALIST
Payer: MEDICARE

## 2020-01-02 DIAGNOSIS — M54.12 CERVICAL RADICULITIS: ICD-10-CM

## 2020-01-02 PROCEDURE — 72141 MRI NECK SPINE W/O DYE: CPT

## 2020-01-21 DIAGNOSIS — E11.21 CONTROLLED TYPE 2 DIABETES MELLITUS WITH DIABETIC NEPHROPATHY, WITHOUT LONG-TERM CURRENT USE OF INSULIN (HCC): ICD-10-CM

## 2020-01-21 RX ORDER — METFORMIN HYDROCHLORIDE 500 MG/1
1000 TABLET, EXTENDED RELEASE ORAL
Qty: 180 TAB | Refills: 0 | Status: SHIPPED | OUTPATIENT
Start: 2020-01-21

## 2020-02-17 ENCOUNTER — HOSPITAL ENCOUNTER (EMERGENCY)
Age: 45
Discharge: HOME | End: 2020-02-17
Attending: EMERGENCY MEDICINE
Payer: MEDICARE

## 2020-02-17 VITALS
HEART RATE: 83 BPM | TEMPERATURE: 97.7 F | DIASTOLIC BLOOD PRESSURE: 79 MMHG | HEIGHT: 66 IN | BODY MASS INDEX: 39.05 KG/M2 | OXYGEN SATURATION: 98 % | RESPIRATION RATE: 18 BRPM | SYSTOLIC BLOOD PRESSURE: 125 MMHG | WEIGHT: 243 LBS

## 2020-02-17 DIAGNOSIS — R09.82 POSTNASAL DRIP: ICD-10-CM

## 2020-02-17 DIAGNOSIS — R68.89 FLU-LIKE SYMPTOMS: Primary | ICD-10-CM

## 2020-02-17 PROCEDURE — 99283 EMERGENCY DEPT VISIT LOW MDM: CPT

## 2020-02-17 PROCEDURE — 74011250637 HC RX REV CODE- 250/637: Performed by: PHYSICIAN ASSISTANT

## 2020-02-17 RX ORDER — LORATADINE 10 MG/1
10 TABLET ORAL DAILY
Qty: 20 TAB | Refills: 0 | Status: SHIPPED | OUTPATIENT
Start: 2020-02-17 | End: 2020-02-17

## 2020-02-17 RX ORDER — ONDANSETRON 4 MG/1
4 TABLET, ORALLY DISINTEGRATING ORAL
Status: COMPLETED | OUTPATIENT
Start: 2020-02-17 | End: 2020-02-17

## 2020-02-17 RX ORDER — FLUTICASONE PROPIONATE 50 MCG
2 SPRAY, SUSPENSION (ML) NASAL DAILY
Qty: 1 BOTTLE | Refills: 0 | Status: SHIPPED | OUTPATIENT
Start: 2020-02-17

## 2020-02-17 RX ORDER — LORATADINE 10 MG/1
10 TABLET ORAL DAILY
Qty: 20 TAB | Refills: 0 | Status: SHIPPED | OUTPATIENT
Start: 2020-02-17

## 2020-02-17 RX ORDER — OSELTAMIVIR PHOSPHATE 75 MG/1
75 CAPSULE ORAL 2 TIMES DAILY
Qty: 10 CAP | Refills: 0 | Status: SHIPPED | OUTPATIENT
Start: 2020-02-17 | End: 2020-02-17

## 2020-02-17 RX ORDER — OSELTAMIVIR PHOSPHATE 75 MG/1
75 CAPSULE ORAL 2 TIMES DAILY
Qty: 10 CAP | Refills: 0 | Status: SHIPPED | OUTPATIENT
Start: 2020-02-17 | End: 2020-02-22

## 2020-02-17 RX ORDER — ONDANSETRON 4 MG/1
4 TABLET, ORALLY DISINTEGRATING ORAL
Qty: 8 TAB | Refills: 0 | Status: SHIPPED | OUTPATIENT
Start: 2020-02-17 | End: 2020-02-17

## 2020-02-17 RX ORDER — FLUTICASONE PROPIONATE 50 MCG
2 SPRAY, SUSPENSION (ML) NASAL DAILY
Qty: 1 BOTTLE | Refills: 0 | Status: SHIPPED | OUTPATIENT
Start: 2020-02-17 | End: 2020-02-17

## 2020-02-17 RX ORDER — ONDANSETRON 4 MG/1
4 TABLET, ORALLY DISINTEGRATING ORAL
Qty: 8 TAB | Refills: 0 | Status: SHIPPED | OUTPATIENT
Start: 2020-02-17 | End: 2020-10-23

## 2020-02-17 RX ADMIN — ONDANSETRON 4 MG: 4 TABLET, ORALLY DISINTEGRATING ORAL at 18:43

## 2020-02-17 NOTE — ED NOTES
CC body aches, body chills, nasal congestion, and nausea x 3 days, denies v/d.  NAD. Emergency Department Nursing Plan of Care       The Nursing Plan of Care is developed from the Nursing assessment and Emergency Department Attending provider initial evaluation. The plan of care may be reviewed in the ED Provider note.     The Plan of Care was developed with the following considerations:   Patient / Family readiness to learn indicated by:verbalized understanding  Persons(s) to be included in education: patient  Barriers to Learning/Limitations:No    Signed     Andrei Gonzalez RN    2/17/2020   6:47 PM

## 2020-02-17 NOTE — DISCHARGE INSTRUCTIONS
Patient Education        Influenza (Flu): Care Instructions  Your Care Instructions    Influenza (flu) is an infection in the lungs and breathing passages. It is caused by the influenza virus. There are different strains, or types, of the flu virus from year to year. Unlike the common cold, the flu comes on suddenly and the symptoms, such as a cough, congestion, fever, chills, fatigue, aches, and pains, are more severe. These symptoms may last up to 10 days. Although the flu can make you feel very sick, it usually doesn't cause serious health problems. Home treatment is usually all you need for flu symptoms. But your doctor may prescribe antiviral medicine to prevent other health problems, such as pneumonia, from developing. Older people and those who have a long-term health condition, such as lung disease, are most at risk for having pneumonia or other health problems. Follow-up care is a key part of your treatment and safety. Be sure to make and go to all appointments, and call your doctor if you are having problems. It's also a good idea to know your test results and keep a list of the medicines you take. How can you care for yourself at home? · Get plenty of rest.  · Drink plenty of fluids, enough so that your urine is light yellow or clear like water. If you have kidney, heart, or liver disease and have to limit fluids, talk with your doctor before you increase the amount of fluids you drink. · Take an over-the-counter pain medicine if needed, such as acetaminophen (Tylenol), ibuprofen (Advil, Motrin), or naproxen (Aleve), to relieve fever, headache, and muscle aches. Read and follow all instructions on the label. No one younger than 20 should take aspirin. It has been linked to Reye syndrome, a serious illness. · Do not smoke. Smoking can make the flu worse. If you need help quitting, talk to your doctor about stop-smoking programs and medicines.  These can increase your chances of quitting for good.  · Breathe moist air from a hot shower or from a sink filled with hot water to help clear a stuffy nose. · Before you use cough and cold medicines, check the label. These medicines may not be safe for young children or for people with certain health problems. · If the skin around your nose and lips becomes sore, put some petroleum jelly on the area. · To ease coughing:  ? Drink fluids to soothe a scratchy throat. ? Suck on cough drops or plain hard candy. ? Take an over-the-counter cough medicine that contains dextromethorphan to help you get some sleep. Read and follow all instructions on the label. ? Raise your head at night with an extra pillow. This may help you rest if coughing keeps you awake. · Take any prescribed medicine exactly as directed. Call your doctor if you think you are having a problem with your medicine. To avoid spreading the flu  · Wash your hands regularly, and keep your hands away from your face. · Stay home from school, work, and other public places until you are feeling better and your fever has been gone for at least 24 hours. The fever needs to have gone away on its own without the help of medicine. · Ask people living with you to talk to their doctors about preventing the flu. They may get antiviral medicine to keep from getting the flu from you. · To prevent the flu in the future, get a flu vaccine every fall. Encourage people living with you to get the vaccine. · Cover your mouth when you cough or sneeze. When should you call for help? Call 911 anytime you think you may need emergency care.  For example, call if:    · You have severe trouble breathing.    Call your doctor now or seek immediate medical care if:    · You have new or worse trouble breathing.     · You seem to be getting much sicker.     · You feel very sleepy or confused.     · You have a new or higher fever.     · You get a new rash.    Watch closely for changes in your health, and be sure to contact your doctor if:    · You begin to get better and then get worse.     · You are not getting better after 1 week. Where can you learn more? Go to http://marissa-marjan.info/. Enter R970 in the search box to learn more about \"Influenza (Flu): Care Instructions. \"  Current as of: June 9, 2019  Content Version: 12.2  © 8592-9047 [employer]. Care instructions adapted under license by [employer] (which disclaims liability or warranty for this information). If you have questions about a medical condition or this instruction, always ask your healthcare professional. [address] any warranty or liability for your use of this information.

## 2020-02-18 NOTE — ED PROVIDER NOTES
EMERGENCY DEPARTMENT HISTORY AND PHYSICAL EXAM      Date: 2/17/2020  Patient Name: Emily Mccray    History of Presenting Illness     Chief Complaint   Patient presents with    Cold Symptoms     History Provided By: Patient    HPI: Emily Mccray, 45 y.o. female with morbid obesity, arthritis, asthma, GERD, high cholesterol, bipolar 1 disorder, diabetes type 2 controlled, hypertension, degenerative disc disease, chronic neck pain, fibromyalgia, s/p tonsil and adenoidectomy who presents ambulatory to the ED with cc of acute mild nasal congestion, postnasal drip, rhinorrhea, nonproductive cough, body aches X 3 days and worse at night. Patient endorses her daughter as well as her  both have similar symptoms. Patient endorses mild nausea due to postnasal drip. Denies vomiting, fever, chills, lethargy, ear pain, sore throat, chest pain, shortness of breath, wheezing, hemoptysis, leg pain or swelling, abdominal pain, urinary symptoms, constipation, diarrhea. Patient endorses taking Sudafed with minimal relief of symptoms. No other medications or modifying factors. Patient denies flu vaccination this year. PCP: Benito Chao MD    There are no other complaints, changes, or physical findings at this time. No current facility-administered medications on file prior to encounter. Current Outpatient Medications on File Prior to Encounter   Medication Sig Dispense Refill    metFORMIN ER (GLUCOPHAGE XR) 500 mg tablet TAKE 2 TABS BY MOUTH DAILY (WITH DINNER). 180 Tab 0    atenoloL (TENORMIN) 25 mg tablet TAKE 1 TABLET BY MOUTH EVERY DAY 30 Tab 0    LINZESS 145 mcg cap capsule TAKE ONE CAPSULE BY MOUTH EVERY DAY BEFORE BREAKFAST 30 Cap 2    topiramate (TOPAMAX) 25 mg tablet TAKE 1 TABLET BY MOUTH TWICE A DAY WITH MEALS 180 Tab 0    clonazePAM (KLONOPIN) 0.5 mg tablet Take 0.5 mg by mouth nightly as needed.       naproxen (NAPROSYN) 500 mg tablet Take 1 Tab by mouth two (2) times daily as needed for Pain. 20 Tab 0    cyclobenzaprine (FLEXERIL) 10 mg tablet Take 1 Tab by mouth three (3) times daily as needed for Muscle Spasm(s). 15 Tab 0    cholecalciferol (VITAMIN D3) 2,000 unit cap capsule TAKE ONE CAPSULE BY MOUTH EVERY DAY 30 Cap 2    polyethylene glycol (MIRALAX) 17 gram packet MIX AND TAKE 1 PACKET BY MOUTH EVERY DAY 28 Packet 5    insulin glargine (LANTUS,BASAGLAR) 100 unit/mL (3 mL) inpn 30 Units by SubCUTAneous route daily. 15 mL 3    traZODone (DESYREL) 100 mg tablet TAKE 1 TABLET BY MOUTH EVERY DAY AT BEDTIME 90 Tab 3    pravastatin (PRAVACHOL) 40 mg tablet TAKE 1 TABLET BY MOUTH EVERY EVENING 90 Tab 3    potassium chloride (KLOR-CON M20) 20 mEq tablet TAKE 1 TABLET BY MOUTH EVERY DAY 90 Tab 0    glimepiride (AMARYL) 2 mg tablet TAKE 1 TABLET BY MOUTH TWICE A  Tab 3    glucose blood VI test strips (ASCENSIA CONTOUR) strip TEST BLOOD SUGAR THREE TIMES DAILY. Dx:E11.9 300 Strip 3    NYSTOP powder Apply  to affected area two (2) times a day.  60 g 5    flash glucose scanning reader ([employer] ROSALINE 10 DAY READER) misc Check blood sugar daily insulin requiring type 2 diabetyes 1 Each 11    montelukast (SINGULAIR) 10 mg tablet take 1 tablet by mouth once daily 30 Tab 6     Past History     Past Medical History:  Past Medical History:   Diagnosis Date    Arthritis     lower back    Asthma     last attack 1994    Bipolar 1 disorder (HCC)     Chronic neck pain     DDD (degenerative disc disease), cervical     followed by ortho    Diabetes mellitus type 2, controlled ([address]     Fibromyalgia     GERD (gastroesophageal reflux disease)     High cholesterol     HTN, goal below 140/90     Migraines     Morbid obesity ([address]     SONY (obstructive sleep apnea)     DOESN'T USE CPAP     Past Surgical History:  Past Surgical History:   Procedure Laterality Date    BREAST SURGERY PROCEDURE UNLISTED  2/21/13     RIGHT BREAST EXCISION OF MULTIPLE SEBACEOUS CYSTS    COLONOSCOPY N/A 1/22/2019 COLONOSCOPY performed by Vikash Church MD at [address] [place] [address] ENDOSCOPY    HX BREAST BIOPSY  2014    right excision- sebaceous cyst    HX  SECTION      HX HEENT      wisdom teeth    HX ORTHOPAEDIC  2017    CERVICAL FUSION    HX OTHER SURGICAL  1997    spinal tap - viral menigitis    HX OTHER SURGICAL      SEBACEOUS CYST-BACK    HX TOTAL LAPAROSCOPIC HYSTERECTOMY W/ BS&O       Family History:  Family History   Problem Relation Age of Onset    Cancer Maternal Aunt         OVARIAN;     Cancer Maternal Grandmother         bone cancer    Hypertension Mother     Diabetes Mother     Hypertension Father     Heart Disease Father         CHF    Cancer Paternal Grandmother 79        breast cancer    Breast Cancer Paternal Grandmother     Anesth Problems Neg Hx      Social History:  Social History     Tobacco Use    Smoking status: Never Smoker    Smokeless tobacco: Never Used   Substance Use Topics    Alcohol use: Yes     Comment: SOCIAL     Drug use: No     Allergies: Allergies   Allergen Reactions    Pcn [Penicillins] Swelling     Swelling throat    Shellfish Containing Products Swelling     Swells throat and eyes    Morphine Other (comments)     FAST HEARTBEAT AND NAUSEA PER PATIENT     Review of Systems   Review of Systems   Constitutional: Positive for fatigue. Negative for appetite change, chills, diaphoresis, fever and unexpected weight change. HENT: Positive for congestion, postnasal drip, rhinorrhea and sneezing. Negative for drooling, ear discharge, ear pain, facial swelling, sinus pressure, sinus pain, sore throat, trouble swallowing and voice change. Eyes: Negative for photophobia, pain, discharge and visual disturbance. Respiratory: Positive for cough. Negative for chest tightness, shortness of breath, wheezing and stridor. Cardiovascular: Negative for chest pain, palpitations and leg swelling. Gastrointestinal: Positive for nausea.  Negative for abdominal pain, constipation, diarrhea and vomiting. Genitourinary: Negative. Negative for dysuria, flank pain, hematuria and urgency. Musculoskeletal: Positive for myalgias. Negative for arthralgias, back pain, gait problem, joint swelling, neck pain and neck stiffness. Skin: Negative. Negative for rash. Neurological: Negative for dizziness, syncope, weakness, light-headedness and headaches. Psychiatric/Behavioral: Negative. Physical Exam   Physical Exam  Vitals signs and nursing note reviewed. Constitutional:       General: She is not in acute distress. Appearance: She is well-developed. She is obese. She is not diaphoretic. Comments: Well appearing AAF sitting upright in NAD. Speaking in clear complete sentences. No cough noted in room. HENT:      Head: Normocephalic and atraumatic. Right Ear: Hearing, tympanic membrane, ear canal and external ear normal.      Left Ear: Hearing, tympanic membrane, ear canal and external ear normal.      Nose: Mucosal edema, congestion and rhinorrhea present. Right Sinus: No maxillary sinus tenderness or frontal sinus tenderness. Left Sinus: No maxillary sinus tenderness or frontal sinus tenderness. Mouth/Throat:      Mouth: Mucous membranes are moist. No angioedema. Dentition: No dental tenderness. Pharynx: Oropharynx is clear. Uvula midline. No pharyngeal swelling, oropharyngeal exudate, posterior oropharyngeal erythema or uvula swelling. Eyes:      Conjunctiva/sclera: Conjunctivae normal.      Pupils: Pupils are equal, round, and reactive to light. Neck:      Musculoskeletal: Normal range of motion. Normal range of motion. No neck rigidity, crepitus, injury, spinous process tenderness or muscular tenderness. Trachea: Trachea and phonation normal.   Cardiovascular:      Rate and Rhythm: Normal rate and regular rhythm. Pulses: Normal pulses. Heart sounds: Normal heart sounds.    Pulmonary:      Effort: Pulmonary effort is normal. No accessory muscle usage or respiratory distress. Breath sounds: Normal breath sounds. No stridor. No decreased breath sounds, wheezing, rhonchi or rales. Chest:      Chest wall: No tenderness. Abdominal:      General: Bowel sounds are normal. There is no distension. Palpations: Abdomen is soft. Abdomen is not rigid. Tenderness: There is no abdominal tenderness. There is no guarding or rebound. Negative signs include Silva's sign and McBurney's sign. Musculoskeletal: Normal range of motion. Lymphadenopathy:      Cervical: No cervical adenopathy. Skin:     General: Skin is warm and dry. Coloration: Skin is not pale. Neurological:      General: No focal deficit present. Mental Status: She is alert and oriented to person, place, and time. She is not disoriented. GCS: GCS eye subscore is 4. GCS verbal subscore is 5. GCS motor subscore is 6. Cranial Nerves: No cranial nerve deficit. Sensory: No sensory deficit. Motor: No tremor or seizure activity. Psychiatric:         Speech: Speech normal.         Behavior: Behavior normal.         Thought Content: Thought content normal.         Judgment: Judgment normal.       Diagnostic Study Results   Labs -   No results found for this or any previous visit (from the past 12 hour(s)). Radiologic Studies -   No orders to display     No results found. Medical Decision Making   I am the first provider for this patient. I reviewed the vital signs, available nursing notes, past medical history, past surgical history, family history and social history. Vital Signs-Reviewed the patient's vital signs.   Patient Vitals for the past 12 hrs:   Temp Pulse Resp BP SpO2   02/17/20 1823 97.7 °F (36.5 °C) 83 18 125/79 98 %     Pulse Oximetry Analysis - 98% on RA    Records Reviewed: Nursing Notes, Old Medical Records, Previous Radiology Studies and Previous Laboratory Studies    Provider Notes (Medical Decision Making): The patient complains of nasal congestion, rhinorrhea, postnasal drip, body aches, nausea x 3 days. Has non-productive cough without dyspnea or wheezing. Symptoms are consistent with an uncomplicated viral URI. DDx: bacterial sinusitis vs. pharyngitis, migraine, flu. Symptomatic therapy suggested: acetaminophen, ibuprofen, antihistamine-decongestant of choice, cough suppressant of choice. Increase fluids, use vaporizer, stay in steamy bathroom tid 15 min prn severe cough, tylenol as needed, rest, avoid smoky areas. Lack of antibiotic effectiveness discussed with her. Symptomatic therapy suggested: gargle for sore throat, use mist at bedside for congestion. Apply facial warm packs for sinus pain or use nasal saline sprays. Follow up prn if not better in 72 hours. ED Course:   Initial assessment performed. The patients presenting problems have been discussed, and they are in agreement with the care plan formulated and outlined with them. I have encouraged them to ask questions as they arise throughout their visit. Progress Note:   Updated pt on all returned results and findings. Discussed the importance of proper follow up as referred below along with return precautions. Pt in agreement with the care plan and expresses agreement with and understanding of all items discussed. Disposition:  7:03 PM  I have discussed with patient their diagnosis, treatment, and follow up plan. The patient agrees to follow up as outlined in discharge paperwork and also to return to the ED with any worsening. Ricky Carmona PA-C      PLAN:  1. Discharge Medication List as of 2/17/2020  6:41 PM      START taking these medications    Details   oseltamivir (TAMIFLU) 75 mg capsule Take 1 Cap by mouth two (2) times a day for 5 days. , Normal, Disp-10 Cap, R-0      fluticasone propionate (FLONASE) 50 mcg/actuation nasal spray 2 Sprays by Both Nostrils route daily. , Normal, Disp-1 Bottle, R-0 dextromethorphan-guaiFENesin (CORICIDIN HBP CHEST MARIE-COUGH)  mg cap Take 1 Cap by mouth every six (6) hours as needed for Cough., Normal, Disp-20 Cap, R-0      loratadine (CLARITIN) 10 mg tablet Take 1 Tab by mouth daily. , Normal, Disp-20 Tab, R-0      ondansetron (ZOFRAN ODT) 4 mg disintegrating tablet Take 1 Tab by mouth every eight (8) hours as needed for Nausea., Normal, Disp-8 Tab, R-0         CONTINUE these medications which have NOT CHANGED    Details   metFORMIN ER (GLUCOPHAGE XR) 500 mg tablet TAKE 2 TABS BY MOUTH DAILY (WITH DINNER). , Normal, Disp-180 Tab, R-0      atenoloL (TENORMIN) 25 mg tablet TAKE 1 TABLET BY MOUTH EVERY DAY, Normal, Disp-30 Tab, R-0      LINZESS 145 mcg cap capsule TAKE ONE CAPSULE BY MOUTH EVERY DAY BEFORE BREAKFAST, Normal, Disp-30 Cap, R-2      topiramate (TOPAMAX) 25 mg tablet TAKE 1 TABLET BY MOUTH TWICE A DAY WITH MEALS, Normal, Disp-180 Tab, R-0      clonazePAM (KLONOPIN) 0.5 mg tablet Take 0.5 mg by mouth nightly as needed., Historical Med      naproxen (NAPROSYN) 500 mg tablet Take 1 Tab by mouth two (2) times daily as needed for Pain., Normal, Disp-20 Tab, R-0      cyclobenzaprine (FLEXERIL) 10 mg tablet Take 1 Tab by mouth three (3) times daily as needed for Muscle Spasm(s). , Normal, Disp-15 Tab, R-0      cholecalciferol (VITAMIN D3) 2,000 unit cap capsule TAKE ONE CAPSULE BY MOUTH EVERY DAY, Normal, Disp-30 Cap, R-2      polyethylene glycol (MIRALAX) 17 gram packet MIX AND TAKE 1 PACKET BY MOUTH EVERY DAY, Normal, Disp-28 Packet, R-5      insulin glargine (LANTUS,BASAGLAR) 100 unit/mL (3 mL) inpn 30 Units by SubCUTAneous route daily. , Normal, Disp-15 mL, R-3      traZODone (DESYREL) 100 mg tablet TAKE 1 TABLET BY MOUTH EVERY DAY AT BEDTIME, NormalPT IS REQUESTING REFILLSDisp-90 Tab, R-3      pravastatin (PRAVACHOL) 40 mg tablet TAKE 1 TABLET BY MOUTH EVERY EVENING, Normal, Disp-90 Tab, R-3      potassium chloride (KLOR-CON M20) 20 mEq tablet TAKE 1 TABLET BY MOUTH EVERY DAY, Normal, Disp-90 Tab, R-0      glimepiride (AMARYL) 2 mg tablet TAKE 1 TABLET BY MOUTH TWICE A DAY, Normal, Disp-180 Tab, R-3      glucose blood VI test strips (ASCENSIA CONTOUR) strip TEST BLOOD SUGAR THREE TIMES DAILY. Dx:E11.9, Normal, Disp-300 Strip, R-3      NYSTOP powder Apply  to affected area two (2) times a day., Normal, Disp-60 g, R-5, KELLY      flash glucose scanning reader ([employer] ROSALINE 10 DAY READER) misc Check blood sugar daily insulin requiring type 2 diabetyes, Normal, Disp-1 Each, R-11      montelukast (SINGULAIR) 10 mg tablet take 1 tablet by mouth once daily, Normal, Disp-30 Tab, R-6           2. Follow-up Information     Follow up With Specialties Details Why Contact Info    Governor MD [facility] Schedule an appointment as soon as possible for a visit in 2 days As needed, If symptoms worsen [address]  [phone]          Return to ED if worse     Diagnosis     Clinical Impression:   1. Flu-like symptoms    2. Postnasal drip            Please note that this dictation was completed with Dragon, computer voice recognition software. Quite often unanticipated grammatical, syntax, homophones, and other interpretive errors are inadvertently transcribed by the computer software. Please disregard these errors. Additionally, please excuse any errors that have escaped final proofreading.

## 2020-02-18 NOTE — ED NOTES
..Discharge summary and discharge medications reviewed with patient and appropriate educational materials and side effects teaching were provided. patient  Given 0 paper prescriptions and 5 electronic prescriptions sent to pt's listed pharmacy. Patient verbalized understanding of the importance of discussing medications with his or her physician or clinic they will be following up with. No si/s of acute distress prior to discharge. Patient offered wheelchair from treatment area to hospital entrance, patient declined wheelchair.

## 2020-04-01 ENCOUNTER — TELEPHONE (OUTPATIENT)
Dept: FAMILY MEDICINE CLINIC | Age: 45
End: 2020-04-01

## 2020-07-27 DIAGNOSIS — E78.5 HYPERLIPIDEMIA LDL GOAL <100: ICD-10-CM

## 2020-07-27 DIAGNOSIS — E55.9 VITAMIN D DEFICIENCY: ICD-10-CM

## 2020-07-27 DIAGNOSIS — E11.21 CONTROLLED TYPE 2 DIABETES MELLITUS WITH DIABETIC NEPHROPATHY, WITHOUT LONG-TERM CURRENT USE OF INSULIN (HCC): Primary | ICD-10-CM

## 2020-09-03 ENCOUNTER — OFFICE VISIT (OUTPATIENT)
Dept: OBGYN CLINIC | Age: 45
End: 2020-09-03
Payer: MEDICARE

## 2020-09-03 VITALS
WEIGHT: 237 LBS | HEIGHT: 66 IN | SYSTOLIC BLOOD PRESSURE: 110 MMHG | BODY MASS INDEX: 38.09 KG/M2 | DIASTOLIC BLOOD PRESSURE: 90 MMHG

## 2020-09-03 DIAGNOSIS — Z01.419 ENCOUNTER FOR GYNECOLOGICAL EXAMINATION WITHOUT ABNORMAL FINDING: ICD-10-CM

## 2020-09-03 DIAGNOSIS — Z12.31 ENCOUNTER FOR SCREENING MAMMOGRAM FOR MALIGNANT NEOPLASM OF BREAST: ICD-10-CM

## 2020-09-03 DIAGNOSIS — Z12.39 SCREENING BREAST EXAMINATION: Primary | ICD-10-CM

## 2020-09-03 DIAGNOSIS — Z11.3 SCREENING FOR STD (SEXUALLY TRANSMITTED DISEASE): ICD-10-CM

## 2020-09-03 DIAGNOSIS — N89.8 VAGINAL ODOR: ICD-10-CM

## 2020-09-03 PROCEDURE — G8419 CALC BMI OUT NRM PARAM NOF/U: HCPCS | Performed by: ADVANCED PRACTICE MIDWIFE

## 2020-09-03 PROCEDURE — G9717 DOC PT DX DEP/BP F/U NT REQ: HCPCS | Performed by: ADVANCED PRACTICE MIDWIFE

## 2020-09-03 PROCEDURE — G8755 DIAS BP > OR = 90: HCPCS | Performed by: ADVANCED PRACTICE MIDWIFE

## 2020-09-03 PROCEDURE — G8752 SYS BP LESS 140: HCPCS | Performed by: ADVANCED PRACTICE MIDWIFE

## 2020-09-03 PROCEDURE — G0101 CA SCREEN;PELVIC/BREAST EXAM: HCPCS | Performed by: ADVANCED PRACTICE MIDWIFE

## 2020-09-03 NOTE — PATIENT INSTRUCTIONS
Well Visit, Ages 25 to 48: Care Instructions  Your Care Instructions     Physical exams can help you stay healthy. Your doctor has checked your overall health and may have suggested ways to take good care of yourself. He or she also may have recommended tests. At home, you can help prevent illness with healthy eating, regular exercise, and other steps. Follow-up care is a key part of your treatment and safety. Be sure to make and go to all appointments, and call your doctor if you are having problems. It's also a good idea to know your test results and keep a list of the medicines you take. How can you care for yourself at home? · Reach and stay at a healthy weight. This will lower your risk for many problems, such as obesity, diabetes, heart disease, and high blood pressure. · Get at least 30 minutes of physical activity on most days of the week. Walking is a good choice. You also may want to do other activities, such as running, swimming, cycling, or playing tennis or team sports. Discuss any changes in your exercise program with your doctor. · Do not smoke or allow others to smoke around you. If you need help quitting, talk to your doctor about stop-smoking programs and medicines. These can increase your chances of quitting for good. · Talk to your doctor about whether you have any risk factors for sexually transmitted infections (STIs). Having one sex partner (who does not have STIs and does not have sex with anyone else) is a good way to avoid these infections. · Use birth control if you do not want to have children at this time. Talk with your doctor about the choices available and what might be best for you. · Protect your skin from too much sun. When you're outdoors from 10 a.m. to 4 p.m., stay in the shade or cover up with clothing and a hat with a wide brim. Wear sunglasses that block UV rays. Even when it's cloudy, put broad-spectrum sunscreen (SPF 30 or higher) on any exposed skin.   · See a dentist one or two times a year for checkups and to have your teeth cleaned. · Wear a seat belt in the car. Follow your doctor's advice about when to have certain tests. These tests can spot problems early. For everyone  · Cholesterol. Have the fat (cholesterol) in your blood tested after age 21. Your doctor will tell you how often to have this done based on your age, family history, or other things that can increase your risk for heart disease. · Blood pressure. Have your blood pressure checked during a routine doctor visit. Your doctor will tell you how often to check your blood pressure based on your age, your blood pressure results, and other factors. · Vision. Talk with your doctor about how often to have a glaucoma test.  · Diabetes. Ask your doctor whether you should have tests for diabetes. · Colon cancer. Your risk for colorectal cancer gets higher as you get older. Some experts say that adults should start regular screening at age 48 and stop at age 76. Others say to start before age 48 or continue after age 76. Talk with your doctor about your risk and when to start and stop screening. For women  · Breast exam and mammogram. Talk to your doctor about when you should have a clinical breast exam and a mammogram. Medical experts differ on whether and how often women under 50 should have these tests. Your doctor can help you decide what is right for you. · Cervical cancer screening test and pelvic exam. Begin with a Pap test at age 24. The test often is part of a pelvic exam. Starting at age 27, you may choose to have a Pap test, an HPV test, or both tests at the same time (called co-testing). Talk with your doctor about how often to have testing. · Tests for sexually transmitted infections (STIs). Ask whether you should have tests for STIs. You may be at risk if you have sex with more than one person, especially if your partners do not wear condoms.   For men  · Tests for sexually transmitted infections (STIs). Ask whether you should have tests for STIs. You may be at risk if you have sex with more than one person, especially if you do not wear a condom. · Testicular cancer exam. Ask your doctor whether you should check your testicles regularly. · Prostate exam. Talk to your doctor about whether you should have a blood test (called a PSA test) for prostate cancer. Experts differ on whether and when men should have this test. Some experts suggest it if you are older than 39 and are -American or have a father or brother who got prostate cancer when he was younger than 72. When should you call for help? Watch closely for changes in your health, and be sure to contact your doctor if you have any problems or symptoms that concern you. Where can you learn more? Go to http://marissa-marjan.info/  Enter P072 in the search box to learn more about \"Well Visit, Ages 25 to 48: Care Instructions. \"  Current as of: August 22, 2019               Content Version: 12.5  © 0521-9047 [employer]. Care instructions adapted under license by [employer] (which disclaims liability or warranty for this information). If you have questions about a medical condition or this instruction, always ask your healthcare professional. [place] [address] any warranty or liability for your use of this information.

## 2020-09-03 NOTE — PROGRESS NOTES
Annual exam ages 40-58      Nanette Morales is a ,  45 y.o. female   No LMP recorded. Patient has had a hysterectomy. Still has one partial Left Ovary and all of Right  Hyst for Adenomosis and persistant ovarian cysts  Had Ablation (Dr. Buddy Khan) that worked for a while, but then had to have ILN  No hx of Abnormal paps  Reports vaginal odor \"Like an onion\"    She presents for her annual checkup. She is having no significant problems. With regard to the Gardasil vaccine, she is older than the FDA approved age to receive it. Menstrual status:    Her periods are absent in flow. Contraception:    The current method of family planning is NA post hysterectomy. Hormonal status:  She reports no perimenstrual type symptoms. She is not having vasomotor symptoms. The patient is not using any ERT. Sexual history:    She  reports previously being sexually active. She reports using the following methods of birth control/protection: None and Surgical.   Only partner  for past 7 yrs  Just recently discovered her  has been unfaithful and desires STI screening    Medical conditions:    Since her last annual GYN exam about five years ago, she has not the following changes in her health history: none. Surgical history confirmed with patient. has a past surgical history that includes hx heent; pr breast surgery procedure unlisted (13); hx  section; hx total laparoscopic hysterectomy w/ bs&o (); hx other surgical (); hx other surgical; hx orthopaedic (2017); hx breast biopsy (2014); and colonoscopy (N/A, 2019). Pap and Mammogram History:    Her most recent Pap smear was normal, obtained 4 year(s) ago.     The patient Had a mammo ~ 2 yrs ago and was reported as normal.    Breast Cancer History/Substance Abuse: Paternal Gr Mother with Br CA  Maternal Gr Mother with Bone CA      Osteoporosis History:    Family history does not include a first or second degree relative with osteopenia or osteoporosis. Past Medical History:   Diagnosis Date    Arthritis     lower back    Asthma     last attack     Bipolar 1 disorder ([facility] [address]     Chronic neck pain     DDD (degenerative disc disease), cervical     followed by ortho    Diabetes mellitus type 2, controlled ([facility] [address]     Fibromyalgia     GERD (gastroesophageal reflux disease)     High cholesterol     HTN, goal below 140/90     Migraines     Morbid obesity (HCC)     SONY (obstructive sleep apnea)     DOESN'T USE CPAP     Past Surgical History:   Procedure Laterality Date    BREAST SURGERY PROCEDURE UNLISTED  13     RIGHT BREAST EXCISION OF MULTIPLE SEBACEOUS CYSTS    COLONOSCOPY N/A 2019    COLONOSCOPY performed by Fred Aldana MD at [facility] HX BREAST BIOPSY  2014    right excision- sebaceous cyst    HX  SECTION      HX HEENT      wisdom teeth    HX ORTHOPAEDIC  2017    CERVICAL FUSION    HX OTHER SURGICAL      spinal tap - viral menigitis    HX OTHER SURGICAL      SEBACEOUS CYST-BACK    HX TOTAL LAPAROSCOPIC HYSTERECTOMY W/ BS&O         Current Outpatient Medications   Medication Sig Dispense Refill    atenoloL (TENORMIN) 25 mg tablet TAKE 1 TABLET BY MOUTH ONCE A DAY AS DIRECTED 15 Tab 0    fluticasone propionate (FLONASE) 50 mcg/actuation nasal spray 2 Sprays by Both Nostrils route daily. 1 Bottle 0    dextromethorphan-guaiFENesin (CORICIDIN HBP CHEST MARIE-COUGH)  mg cap Take 1 Cap by mouth every six (6) hours as needed for Cough. 20 Cap 0    loratadine (CLARITIN) 10 mg tablet Take 1 Tab by mouth daily. 20 Tab 0    ondansetron (ZOFRAN ODT) 4 mg disintegrating tablet Take 1 Tab by mouth every eight (8) hours as needed for Nausea. 8 Tab 0    metFORMIN ER (GLUCOPHAGE XR) 500 mg tablet TAKE 2 TABS BY MOUTH DAILY (WITH DINNER).  180 Tab 0    LINZESS 145 mcg cap capsule TAKE ONE CAPSULE BY MOUTH EVERY DAY BEFORE BREAKFAST 30 Cap 2    topiramate (TOPAMAX) 25 mg tablet TAKE 1 TABLET BY MOUTH TWICE A DAY WITH MEALS 180 Tab 0    clonazePAM (KLONOPIN) 0.5 mg tablet Take 0.5 mg by mouth nightly as needed.  naproxen (NAPROSYN) 500 mg tablet Take 1 Tab by mouth two (2) times daily as needed for Pain. 20 Tab 0    cyclobenzaprine (FLEXERIL) 10 mg tablet Take 1 Tab by mouth three (3) times daily as needed for Muscle Spasm(s). 15 Tab 0    cholecalciferol (VITAMIN D3) 2,000 unit cap capsule TAKE ONE CAPSULE BY MOUTH EVERY DAY 30 Cap 2    polyethylene glycol (MIRALAX) 17 gram packet MIX AND TAKE 1 PACKET BY MOUTH EVERY DAY 28 Packet 5    insulin glargine (LANTUS,BASAGLAR) 100 unit/mL (3 mL) inpn 30 Units by SubCUTAneous route daily. 15 mL 3    traZODone (DESYREL) 100 mg tablet TAKE 1 TABLET BY MOUTH EVERY DAY AT BEDTIME 90 Tab 3    pravastatin (PRAVACHOL) 40 mg tablet TAKE 1 TABLET BY MOUTH EVERY EVENING 90 Tab 3    potassium chloride (KLOR-CON M20) 20 mEq tablet TAKE 1 TABLET BY MOUTH EVERY DAY 90 Tab 0    glimepiride (AMARYL) 2 mg tablet TAKE 1 TABLET BY MOUTH TWICE A  Tab 3    glucose blood VI test strips (ASCENSIA CONTOUR) strip TEST BLOOD SUGAR THREE TIMES DAILY. Dx:E11.9 300 Strip 3    NYSTOP powder Apply  to affected area two (2) times a day. 60 g 5    flash glucose scanning reader (FREESTYLE ROSALINE 10 DAY READER) misc Check blood sugar daily insulin requiring type 2 diabetyes 1 Each 11    montelukast (SINGULAIR) 10 mg tablet take 1 tablet by mouth once daily 30 Tab 6     Allergies: Pcn [penicillins]; Shellfish containing products; and Morphine     Tobacco History:  reports that she has never smoked. She has never used smokeless tobacco.  Alcohol Abuse:  reports current alcohol use. Drug Abuse:  reports no history of drug use.     Family Medical/Cancer History:   Family History   Problem Relation Age of Onset    Cancer Maternal Aunt         OVARIAN;     Cancer Maternal Grandmother         bone cancer    Hypertension Mother  Diabetes Mother     Hypertension Father     Heart Disease Father         CHF    Cancer Paternal Grandmother 79        breast cancer    Breast Cancer Paternal Grandmother     Anesth Problems Neg Hx         Review of Systems - History obtained from the patient  Constitutional: negative for weight loss, fever, night sweats  HEENT: negative for hearing loss, earache, congestion, snoring, sorethroat  CV: negative for chest pain, palpitations, edema  Resp: negative for cough, shortness of breath, wheezing  GI: negative for change in bowel habits, abdominal pain, black or bloody stools  : negative for frequency, dysuria, hematuria, vaginal discharge  MSK: negative for back pain, joint pain, muscle pain  Breast: negative for breast lumps, nipple discharge, galactorrhea  Skin :negative for itching, rash, hives  Neuro: negative for dizziness, headache, confusion, weakness  Psych: negative for anxiety, depression, change in mood  Heme/lymph: negative for bleeding, bruising, pallor    Physical Exam    Visit Vitals  /90   Ht 5' 6\" (1.676 m)   Wt 237 lb (107.5 kg)   BMI 38.25 kg/m²       Constitutional  · Appearance: well-nourished, well developed, alert, in no acute distress    HENT  · Head and Face: appears normal    Neck  · Inspection/Palpation: normal appearance, no masses or tenderness  · Lymph Nodes: no lymphadenopathy present  · Thyroid: gland size normal, nontender, no nodules or masses present on palpation    Chest  · Respiratory Effort: breathing unlabored  · Auscultation: normal breath sounds    Cardiovascular  · Heart:  · Auscultation: regular rate and rhythm without murmur    Breasts  · Inspection of Breasts: breasts symmetrical, no skin changes, no discharge present, nipple appearance normal, no skin retraction present  · Palpation of Breasts and Axillae: no masses present on palpation, no breast tenderness  · Axillary Lymph Nodes: no lymphadenopathy present    Gastrointestinal  · Abdominal Examination: abdomen non-tender to palpation, normal bowel sounds, no masses present  · Liver and spleen: no hepatomegaly present, spleen not palpable  · Hernias: no hernias identified    Genitourinary  · External Genitalia: normal appearance for age, no discharge present, no tenderness present, no inflammatory lesions present, no masses present, no atrophy present  · Vagina: normal vaginal vault without central or paravaginal defects, no discharge present, no inflammatory lesions present, no masses present  · Bladder: non-tender to palpation  · Urethra: appears normal  · Cervix: Absent  · Uterus: Absent  · Adnexa: Not palpated  · Perineum: perineum within normal limits, no evidence of trauma, no rashes or skin lesions present  · Anus: anus within normal limits, no hemorrhoids present  · Inguinal Lymph Nodes: no lymphadenopathy present    Skin  · General Inspection: no rash, no lesions identified    Neurologic/Psychiatric  · Mental Status:  · Orientation: grossly oriented to person, place and time  · Mood and Affect: mood normal, affect appropriate    Assessment:  Routine gynecologic examination  Her current medical status is satisfactory with no evidence of significant gynecologic issues. Plan:  STI screen, call with results  Mammogram referral sent  Counseled re: diet, exercise, healthy lifestyle  Return for yearly wellness visits  Rec annual mammogram    Jayde Barboza.  WINTER [place]/MARIAN

## 2020-09-04 LAB
COMMENT, 144067: NORMAL
HBV SURFACE AG SERPL QL IA: NEGATIVE
HCV AB S/CO SERPL IA: <0.1 S/CO RATIO (ref 0–0.9)
HIV 1+2 AB+HIV1 P24 AG SERPL QL IA: NON REACTIVE
RPR SER QL: NON REACTIVE

## 2020-09-07 LAB
A VAGINAE DNA VAG QL NAA+PROBE: NORMAL SCORE
BVAB2 DNA VAG QL NAA+PROBE: NORMAL SCORE
C ALBICANS DNA VAG QL NAA+PROBE: NEGATIVE
C GLABRATA DNA VAG QL NAA+PROBE: NEGATIVE
C TRACH DNA VAG QL NAA+PROBE: NEGATIVE
MEGA1 DNA VAG QL NAA+PROBE: NORMAL SCORE
N GONORRHOEA DNA VAG QL NAA+PROBE: NEGATIVE
T VAGINALIS DNA VAG QL NAA+PROBE: NEGATIVE

## 2020-09-10 NOTE — PROGRESS NOTES
Called, spoke with patients, results given, patient acknowledged understanding and appreciated the call.

## 2020-10-23 ENCOUNTER — APPOINTMENT (OUTPATIENT)
Dept: GENERAL RADIOLOGY | Age: 45
End: 2020-10-23
Attending: PHYSICIAN ASSISTANT
Payer: MEDICARE

## 2020-10-23 ENCOUNTER — HOSPITAL ENCOUNTER (EMERGENCY)
Age: 45
Discharge: HOME | End: 2020-10-23
Attending: EMERGENCY MEDICINE
Payer: MEDICARE

## 2020-10-23 VITALS
WEIGHT: 232 LBS | HEART RATE: 92 BPM | HEIGHT: 66 IN | DIASTOLIC BLOOD PRESSURE: 72 MMHG | TEMPERATURE: 98.4 F | RESPIRATION RATE: 16 BRPM | OXYGEN SATURATION: 100 % | SYSTOLIC BLOOD PRESSURE: 118 MMHG | BODY MASS INDEX: 37.28 KG/M2

## 2020-10-23 DIAGNOSIS — R11.2 NON-INTRACTABLE VOMITING WITH NAUSEA, UNSPECIFIED VOMITING TYPE: ICD-10-CM

## 2020-10-23 DIAGNOSIS — S43.402A SPRAIN OF LEFT SHOULDER, UNSPECIFIED SHOULDER SPRAIN TYPE, INITIAL ENCOUNTER: Primary | ICD-10-CM

## 2020-10-23 PROCEDURE — 74011000250 HC RX REV CODE- 250: Performed by: PHYSICIAN ASSISTANT

## 2020-10-23 PROCEDURE — 74011636637 HC RX REV CODE- 636/637: Performed by: PHYSICIAN ASSISTANT

## 2020-10-23 PROCEDURE — 74011250637 HC RX REV CODE- 250/637: Performed by: PHYSICIAN ASSISTANT

## 2020-10-23 PROCEDURE — 99283 EMERGENCY DEPT VISIT LOW MDM: CPT

## 2020-10-23 PROCEDURE — 73030 X-RAY EXAM OF SHOULDER: CPT

## 2020-10-23 RX ORDER — CYCLOBENZAPRINE HCL 10 MG
10 TABLET ORAL
Qty: 15 TAB | Refills: 0 | OUTPATIENT
Start: 2020-10-23 | End: 2022-03-21

## 2020-10-23 RX ORDER — ONDANSETRON 4 MG/1
4 TABLET, ORALLY DISINTEGRATING ORAL
Status: COMPLETED | OUTPATIENT
Start: 2020-10-23 | End: 2020-10-23

## 2020-10-23 RX ORDER — CYCLOBENZAPRINE HCL 10 MG
10 TABLET ORAL
Status: COMPLETED | OUTPATIENT
Start: 2020-10-23 | End: 2020-10-23

## 2020-10-23 RX ORDER — LIDOCAINE 4 G/100G
1 PATCH TOPICAL
Status: DISCONTINUED | OUTPATIENT
Start: 2020-10-23 | End: 2020-10-23 | Stop reason: HOSPADM

## 2020-10-23 RX ORDER — NAPROXEN 500 MG/1
500 TABLET ORAL
Qty: 20 TAB | Refills: 0 | Status: SHIPPED | OUTPATIENT
Start: 2020-10-23

## 2020-10-23 RX ORDER — PREDNISONE 20 MG/1
60 TABLET ORAL
Status: COMPLETED | OUTPATIENT
Start: 2020-10-23 | End: 2020-10-23

## 2020-10-23 RX ORDER — ONDANSETRON 4 MG/1
4 TABLET, ORALLY DISINTEGRATING ORAL
Qty: 8 TAB | Refills: 0 | Status: SHIPPED | OUTPATIENT
Start: 2020-10-23

## 2020-10-23 RX ADMIN — CYCLOBENZAPRINE HYDROCHLORIDE 10 MG: 10 TABLET, FILM COATED ORAL at 12:07

## 2020-10-23 RX ADMIN — ONDANSETRON 4 MG: 4 TABLET, ORALLY DISINTEGRATING ORAL at 12:58

## 2020-10-23 RX ADMIN — ONDANSETRON 4 MG: 4 TABLET, ORALLY DISINTEGRATING ORAL at 12:08

## 2020-10-23 RX ADMIN — PREDNISONE 60 MG: 20 TABLET ORAL at 12:07

## 2020-10-23 NOTE — ED PROVIDER NOTES
EMERGENCY DEPARTMENT HISTORY AND PHYSICAL EXAM      Date: 10/23/2020  Patient Name: Angela López    History of Presenting Illness     Chief Complaint   Patient presents with    Arm Pain     left arm yesterday moving      History Provided By: Patient    HPI: Angela López, 39 y.o. female with medical history significant for morbid obesity, asthma, bipolar 1 disorder, diabetes type 2 controlled, hypertension, degenerative disc disease, fibromyalgia, chronic neck pain who presents via self to the ED with cc of acute moderate aching left shoulder pain X 1 day secondary to moving furniture yesterday. Denies any blunt injury or trauma. Endorses taking ibuprofen 1 hour prior to arrival as well as using a sling that her male partner had at home with minimal relief of symptoms. Pain exacerbated with movement and palpation of left shoulder. Patient endorses that the pain is causing her to have nausea. Denies fever, chills, vomiting, chest pain, shortness of breath, numbness, tingling, lightheadedness, dizziness, focal weakness, wound, bruising. PCP: Other, MD [place]    There are no other complaints, changes, or physical findings at this time. No current facility-administered medications on file prior to encounter. Current Outpatient Medications on File Prior to Encounter   Medication Sig Dispense Refill    atenoloL (TENORMIN) 25 mg tablet TAKE 1 TABLET BY MOUTH ONCE A DAY AS DIRECTED 15 Tab 0    fluticasone propionate (FLONASE) 50 mcg/actuation nasal spray 2 Sprays by Both Nostrils route daily. 1 Bottle 0    dextromethorphan-guaiFENesin (CORICIDIN HBP CHEST MARIE-COUGH)  mg cap Take 1 Cap by mouth every six (6) hours as needed for Cough. 20 Cap 0    loratadine (CLARITIN) 10 mg tablet Take 1 Tab by mouth daily. 20 Tab 0    [DISCONTINUED] ondansetron (ZOFRAN ODT) 4 mg disintegrating tablet Take 1 Tab by mouth every eight (8) hours as needed for Nausea.  8 Tab 0    metFORMIN ER (GLUCOPHAGE XR) 500 mg tablet TAKE 2 TABS BY MOUTH DAILY (WITH DINNER). 180 Tab 0    LINZESS 145 mcg cap capsule TAKE ONE CAPSULE BY MOUTH EVERY DAY BEFORE BREAKFAST 30 Cap 2    topiramate (TOPAMAX) 25 mg tablet TAKE 1 TABLET BY MOUTH TWICE A DAY WITH MEALS 180 Tab 0    clonazePAM (KLONOPIN) 0.5 mg tablet Take 0.5 mg by mouth nightly as needed.  [DISCONTINUED] naproxen (NAPROSYN) 500 mg tablet Take 1 Tab by mouth two (2) times daily as needed for Pain. 20 Tab 0    [DISCONTINUED] cyclobenzaprine (FLEXERIL) 10 mg tablet Take 1 Tab by mouth three (3) times daily as needed for Muscle Spasm(s). 15 Tab 0    cholecalciferol (VITAMIN D3) 2,000 unit cap capsule TAKE ONE CAPSULE BY MOUTH EVERY DAY 30 Cap 2    polyethylene glycol (MIRALAX) 17 gram packet MIX AND TAKE 1 PACKET BY MOUTH EVERY DAY 28 Packet 5    insulin glargine (LANTUS,BASAGLAR) 100 unit/mL (3 mL) inpn 30 Units by SubCUTAneous route daily. 15 mL 3    traZODone (DESYREL) 100 mg tablet TAKE 1 TABLET BY MOUTH EVERY DAY AT BEDTIME 90 Tab 3    pravastatin (PRAVACHOL) 40 mg tablet TAKE 1 TABLET BY MOUTH EVERY EVENING 90 Tab 3    potassium chloride (KLOR-CON M20) 20 mEq tablet TAKE 1 TABLET BY MOUTH EVERY DAY 90 Tab 0    glimepiride (AMARYL) 2 mg tablet TAKE 1 TABLET BY MOUTH TWICE A  Tab 3    glucose blood VI test strips (ASCENSIA CONTOUR) strip TEST BLOOD SUGAR THREE TIMES DAILY. Dx:E11.9 300 Strip 3    NYSTOP powder Apply  to affected area two (2) times a day.  60 g 5    flash glucose scanning reader (FREESTYLE ROSALINE 10 DAY READER) misc Check blood sugar daily insulin requiring type 2 diabetyes 1 Each 11    montelukast (SINGULAIR) 10 mg tablet take 1 tablet by mouth once daily 30 Tab 6     Past History     Past Medical History:  Past Medical History:   Diagnosis Date    Arthritis     lower back    Asthma     last attack 1994    Bipolar 1 disorder (HCC)     Chronic neck pain     DDD (degenerative disc disease), cervical     followed by ortho    Diabetes mellitus type 2, controlled ([facility] [address]     Fibromyalgia     GERD (gastroesophageal reflux disease)     High cholesterol     HTN, goal below 140/90     Migraines     Morbid obesity (HCC)     SONY (obstructive sleep apnea)     DOESN'T USE CPAP     Past Surgical History:  Past Surgical History:   Procedure Laterality Date    BREAST SURGERY PROCEDURE UNLISTED  13     RIGHT BREAST EXCISION OF MULTIPLE SEBACEOUS CYSTS    COLONOSCOPY N/A 2019    COL[facility] END[facility]COPY    HX BREAST BIOPSY  2014    right excision- sebaceous cyst    HX  SECTION      HX HEENT      wisdom teeth    HX ORTHOPAEDIC  2017    CERVICAL FUSION    HX OTHER SURGICAL  1997    spinal tap - viral menigitis    HX OTHER SURGICAL      SEBACEOUS CYST-BACK    HX TOTAL LAPAROSCOPIC HYSTERECTOMY W/ BS&O       Family History:  Family History   Problem Relation Age of Onset    Cancer Maternal Aunt         OVARIAN;     Cancer Maternal Grandmother         bone cancer    Hypertension Mother     Diabetes Mother     Hypertension Father     Heart Disease Father         CHF    Cancer Paternal Grandmother 79        breast cancer    Breast Cancer Paternal Grandmother     Anesth Problems Neg Hx      Social History:  Social History     Tobacco Use    Smoking status: Never Smoker    Smokeless tobacco: Never Used   Substance Use Topics    Alcohol use: Yes     Frequency: Monthly or less     Comment: SOCIAL     Drug use: No     Allergies: Allergies   Allergen Reactions    Pcn [Penicillins] Swelling     Swelling throat    Shellfish Containing Products Swelling     Swells throat and eyes    Morphine Other (comments)     FAST HEARTBEAT AND NAUSEA PER PATIENT     Review of Systems   Review of Systems   Constitutional: Negative. Negative for chills, fatigue and fever. Respiratory: Negative. Negative for cough and shortness of breath. Cardiovascular: Negative.   Negative for chest pain, palpitations and leg swelling. Gastrointestinal: Positive for nausea. Negative for abdominal pain, diarrhea and vomiting. Genitourinary: Negative. Negative for difficulty urinating and dysuria. Musculoskeletal: Positive for arthralgias. Negative for back pain, joint swelling, myalgias, neck pain and neck stiffness. Skin: Negative. Negative for color change, rash and wound. Neurological: Negative. Negative for dizziness, numbness and headaches. Psychiatric/Behavioral: Negative. Physical Exam   Physical Exam  Vitals signs and nursing note reviewed. Constitutional:       General: She is not in acute distress. Appearance: She is well-developed. She is obese. She is not diaphoretic. HENT:      Head: Normocephalic and atraumatic. Right Ear: Hearing and external ear normal.      Left Ear: Hearing and external ear normal.      Nose: Nose normal.   Eyes:      Conjunctiva/sclera: Conjunctivae normal.      Pupils: Pupils are equal, round, and reactive to light. Neck:      Musculoskeletal: Normal range of motion. Cardiovascular:      Rate and Rhythm: Normal rate and regular rhythm. Pulses: Normal pulses. Radial pulses are 2+ on the right side and 2+ on the left side. Heart sounds: Normal heart sounds. Pulmonary:      Effort: Pulmonary effort is normal. No respiratory distress. Musculoskeletal:      Right shoulder: Normal.      Left shoulder: She exhibits decreased range of motion (d/t pain), tenderness, bony tenderness and pain (Reproducible with movement and palpation of left shoulder joint. ). She exhibits no swelling, no effusion, no crepitus, no deformity, no laceration, no spasm, normal pulse and normal strength. Left elbow: Normal.   Skin:     General: Skin is warm and dry. Neurological:      Mental Status: She is alert and oriented to person, place, and time. Psychiatric:         Behavior: Behavior normal.         Thought Content:  Thought content normal.         Judgment: Judgment normal.       Diagnostic Study Results   Labs -   No results found for this or any previous visit (from the past 12 hour(s)). Radiologic Studies -   XR SHOULDER LT AP/LAT MIN 2 V   Final Result   IMPRESSION: No acute abnormality. Xr Shoulder Lt Ap/lat Min 2 V    Result Date: 10/23/2020  IMPRESSION: No acute abnormality. Medical Decision Making   I am the first provider for this patient. I reviewed the vital signs, available nursing notes, past medical history, past surgical history, family history and social history. Vital Signs-Reviewed the patient's vital signs. Patient Vitals for the past 24 hrs:   Temp Pulse Resp BP SpO2   10/23/20 1130 98.4 °F (36.9 °C) 92 16 118/72 100 %     Pulse Oximetry Analysis - 100% on RA and normal    Records Reviewed: Nursing Notes, Old Medical Records, Previous Radiology Studies and Previous Laboratory Studies    Provider Notes (Medical Decision Making):   Patient presents with shoulder pain. Ddx: dislocation, fracture, strain, sprain, AC separation, clavicle fracture. Will get XR to further evaluate and treat the pain. For the shoulder problem, she is discharged with a sling and asked to apply heat for 10-15 minutes four times a day followed by passive pendulum range of motion exercises to prevent frozen shoulder. She may use small weight in the hand if desired and tolerated. In the future, warm up and stretch the shoulder prior to exercising in a similar fashion. If any worsening symptoms, can follow up with Sports medicine. ED Course:   Initial assessment performed. The patients presenting problems have been discussed, and they are in agreement with the care plan formulated and outlined with them. I have encouraged them to ask questions as they arise throughout their visit. Progress Note:   Updated pt on all returned results and findings.  Discussed the importance of proper follow up as referred below along with return precautions. Pt in agreement with the care plan and expresses agreement with and understanding of all items discussed. Disposition:  1:20 PM  I have discussed with patient their diagnosis, treatment, and follow up plan. The patient agrees to follow up as outlined in discharge paperwork and also to return to the ED with any worsening. Demar Call PA-C      PLAN:  1. Current Discharge Medication List      CONTINUE these medications which have CHANGED    Details   cyclobenzaprine (FLEXERIL) 10 mg tablet Take 1 Tab by mouth three (3) times daily as needed for Muscle Spasm(s). Qty: 15 Tab, Refills: 0      naproxen (NAPROSYN) 500 mg tablet Take 1 Tab by mouth two (2) times daily as needed for Pain. Qty: 20 Tab, Refills: 0      ondansetron (ZOFRAN ODT) 4 mg disintegrating tablet Take 1 Tab by mouth every eight (8) hours as needed for Nausea. Qty: 8 Tab, Refills: 0         CONTINUE these medications which have NOT CHANGED    Details   atenoloL (TENORMIN) 25 mg tablet TAKE 1 TABLET BY MOUTH ONCE A DAY AS DIRECTED  Qty: 15 Tab, Refills: 0      fluticasone propionate (FLONASE) 50 mcg/actuation nasal spray 2 Sprays by Both Nostrils route daily. Qty: 1 Bottle, Refills: 0      dextromethorphan-guaiFENesin (CORICIDIN HBP CHEST MARIE-COUGH)  mg cap Take 1 Cap by mouth every six (6) hours as needed for Cough. Qty: 20 Cap, Refills: 0      loratadine (CLARITIN) 10 mg tablet Take 1 Tab by mouth daily. Qty: 20 Tab, Refills: 0      metFORMIN ER (GLUCOPHAGE XR) 500 mg tablet TAKE 2 TABS BY MOUTH DAILY (WITH DINNER).   Qty: 180 Tab, Refills: 0    Associated Diagnoses: Controlled type 2 diabetes mellitus with diabetic nephropathy, without long-term current use of insulin (HCC)      LINZESS 145 mcg cap capsule TAKE ONE CAPSULE BY MOUTH EVERY DAY BEFORE BREAKFAST  Qty: 30 Cap, Refills: 2    Associated Diagnoses: Constipation, unspecified constipation type      topiramate (TOPAMAX) 25 mg tablet TAKE 1 TABLET BY MOUTH TWICE A DAY WITH MEALS  Qty: 180 Tab, Refills: 0    Associated Diagnoses: Obesity (BMI 30-39.9)      clonazePAM (KLONOPIN) 0.5 mg tablet Take 0.5 mg by mouth nightly as needed. cholecalciferol (VITAMIN D3) 2,000 unit cap capsule TAKE ONE CAPSULE BY MOUTH EVERY DAY  Qty: 30 Cap, Refills: 2    Associated Diagnoses: Vitamin D deficiency      polyethylene glycol (MIRALAX) 17 gram packet MIX AND TAKE 1 PACKET BY MOUTH EVERY DAY  Qty: 28 Packet, Refills: 5    Associated Diagnoses: Slow transit constipation      insulin glargine (LANTUS,BASAGLAR) 100 unit/mL (3 mL) inpn 30 Units by SubCUTAneous route daily. Qty: 15 mL, Refills: 3    Associated Diagnoses: Controlled type 2 diabetes mellitus with diabetic nephropathy, without long-term current use of insulin ([facility])      traZODone (DESYREL) 100 mg tablet TAKE 1 TABLET BY MOUTH EVERY DAY AT BEDTIME  Qty: 90 Tab, Refills: 3    Comments: PT IS REQUESTING REFILLS      pravastatin (PRAVACHOL) 40 mg tablet TAKE 1 TABLET BY MOUTH EVERY EVENING  Qty: 90 Tab, Refills: 3    Associated Diagnoses: Hyperlipidemia LDL goal <100      potassium chloride (KLOR-CON M20) 20 mEq tablet TAKE 1 TABLET BY MOUTH EVERY DAY  Qty: 90 Tab, Refills: 0    Associated Diagnoses: Controlled type 2 diabetes mellitus with diabetic nephropathy, without long-term current use of insulin ([facility])      glimepiride (AMARYL) 2 mg tablet TAKE 1 TABLET BY MOUTH TWICE A DAY  Qty: 180 Tab, Refills: 3    Associated Diagnoses: Well controlled type 2 diabetes mellitus ([facility] [address]      glucose blood VI test strips (ASCENSIA CONTOUR) strip TEST BLOOD SUGAR THREE TIMES DAILY. Dx:E11.9  Qty: 300 Strip, Refills: 3    Associated Diagnoses: Controlled type 2 diabetes mellitus with diabetic nephropathy, without long-term current use of insulin ([facility])      NYSTOP powder Apply  to affected area two (2) times a day.   Qty: 60 g, Refills: 5      flash glucose scanning reader (FREESTYLE ROSALINE 10 DAY READER) misc Check blood sugar daily insulin requiring type 2 diabetyes  Qty: 1 Each, Refills: 11    Associated Diagnoses: Controlled type 2 diabetes mellitus without complication, with long-term current use of insulin (HCC)      montelukast (SINGULAIR) 10 mg tablet take 1 tablet by mouth once daily  Qty: 30 Tab, Refills: 6           2. Follow-up Information     Follow up With Specialties Details Why Contact Info    [facility]  Schedule an appointment as soon as possible for a visit in 2 days As needed [facility] [address] DEPT Emergency Medicine Go to As needed, If symptoms worsen [address] [facility]  [phone]        Return to ED if worse     Diagnosis     Clinical Impression:   1. Sprain of left shoulder, unspecified shoulder sprain type, initial encounter    2. Non-intractable vomiting with nausea, unspecified vomiting type            Please note that this dictation was completed with Dragon, computer voice recognition software. Quite often unanticipated grammatical, syntax, homophones, and other interpretive errors are inadvertently transcribed by the computer software. Please disregard these errors. Additionally, please excuse any errors that have escaped final proofreading.

## 2020-10-23 NOTE — ED NOTES

## 2020-10-23 NOTE — DISCHARGE INSTRUCTIONS
Patient Education        Shoulder Pain: Care Instructions  Your Care Instructions     You can hurt your shoulder by using it too much during an activity, such as fishing or baseball. It can also happen as part of the everyday wear and tear of getting older. Shoulder injuries can be slow to heal, but your shoulder should get better with time. Your doctor may recommend a sling to rest your shoulder. If you have injured your shoulder, you may need testing and treatment. Follow-up care is a key part of your treatment and safety. Be sure to make and go to all appointments, and call your doctor if you are having problems. It's also a good idea to know your test results and keep a list of the medicines you take. How can you care for yourself at home? · Take pain medicines exactly as directed. ? If the doctor gave you a prescription medicine for pain, take it as prescribed. ? If you are not taking a prescription pain medicine, ask your doctor if you can take an over-the-counter medicine. ? Do not take two or more pain medicines at the same time unless the doctor told you to. Many pain medicines contain acetaminophen, which is Tylenol. Too much acetaminophen (Tylenol) can be harmful. · If your doctor recommends that you wear a sling, use it as directed. Do not take it off before your doctor tells you to. · Put ice or a cold pack on the sore area for 10 to 20 minutes at a time. Put a thin cloth between the ice and your skin. · If there is no swelling, you can put moist heat, a heating pad, or a warm cloth on your shoulder. Some doctors suggest alternating between hot and cold. · Rest your shoulder for a few days. If your doctor recommends it, you can then begin gentle exercise of the shoulder, but do not lift anything heavy. When should you call for help? Call 911 anytime you think you may need emergency care. For example, call if:    · You have chest pain or pressure.  This may occur with:  ? Sweating. ? Shortness of breath. ? Nausea or vomiting. ? Pain that spreads from the chest to the neck, jaw, or one or both shoulders or arms. ? Dizziness or lightheadedness. ? A fast or uneven pulse. After calling 911, chew 1 adult-strength aspirin. Wait for an ambulance. Do not try to drive yourself.     · Your arm or hand is cool or pale or changes color. Call your doctor now or seek immediate medical care if:    · You have signs of infection, such as:  ? Increased pain, swelling, warmth, or redness in your shoulder. ? Red streaks leading from a place on your shoulder. ? Pus draining from an area of your shoulder. ? Swollen lymph nodes in your neck, armpits, or groin. ? A fever. Watch closely for changes in your health, and be sure to contact your doctor if:    · You cannot use your shoulder.     · Your shoulder does not get better as expected. Where can you learn more? Go to http://www.[employer].com/  Enter H996 in the search box to learn more about \"Shoulder Pain: Care Instructions. \"  Current as of: March 2, 2020               Content Version: 12.6  © 1202-5244 [employer]. Care instructions adapted under license by [employer] (which disclaims liability or warranty for this information). If you have questions about a medical condition or this instruction, always ask your healthcare professional. [place] [address] any warranty or liability for your use of this information.

## 2021-01-07 ENCOUNTER — TELEPHONE (OUTPATIENT)
Dept: FAMILY MEDICINE CLINIC | Age: 46
End: 2021-01-07

## 2021-01-07 NOTE — TELEPHONE ENCOUNTER
Informed pt that Medical record request form 12/2020 was received on 12- and to allow them some time to respond due to the holidays    ----- Message from [facility] sent at 1/7/2021  9:46 AM EST -----  Regarding: Medicarl Records Request  Contact: [phone]  General Message/Vendor Calls    Caller's first and last name: NA      Reason for call: Requesting medical records from all time be sent to new physician office. Callback required yes/no and why: Yes, confirmation of medical records being sent to new office. Best contact number(s): [phone]      Details to clarify the request: Patient advising she needs all time medical records sent to new primary care office of [facility]) located at [address] [place] [address] 9, [address]. The office phone number is [phone] and the fax number is [phone]. Please contact patient as she has concerns of her information being sent to another office as well and to assure no breach of privacy.       [facility]

## 2021-04-06 ENCOUNTER — HOSPITAL ENCOUNTER (OUTPATIENT)
Dept: MAMMOGRAPHY | Age: 46
Discharge: HOME | End: 2021-04-06
Attending: ADVANCED PRACTICE MIDWIFE
Payer: MEDICARE

## 2021-04-06 DIAGNOSIS — Z12.39 SCREENING BREAST EXAMINATION: ICD-10-CM

## 2021-04-06 DIAGNOSIS — Z12.31 ENCOUNTER FOR SCREENING MAMMOGRAM FOR MALIGNANT NEOPLASM OF BREAST: ICD-10-CM

## 2021-04-06 PROCEDURE — 77063 BREAST TOMOSYNTHESIS BI: CPT

## 2022-02-11 NOTE — MR AVS SNAPSHOT
Visit Information Date & Time Provider Department Dept. Phone Encounter #  
 2/20/2017 10:45 AM Estela Valle MD [address] [place] [address] [id number] Follow-up Instructions Return in about 2 weeks (around 3/6/2017). Your Appointments 4/7/2017 11:15 AM  
ROUTINE CARE with Lonnie Mcmullen MD  
[facility]) Appt Note: f/up  diabetic check [address] [phone]  
[phone]  
  
   
 [address] [phone] Upcoming Health Maintenance Date Due  
 EYE EXAM RETINAL OR DILATED Q1 9/27/1987 Pneumococcal 19-64 Medium Risk (1 of 1 - PPSV23) 9/27/1996 PAP AKA CERVICAL CYTOLOGY 9/27/1998 MICROALBUMIN Q1 2/26/2017 HEMOGLOBIN A1C Q6M 5/11/2017 FOOT EXAM Q1 5/26/2017 LIPID PANEL Q1 11/11/2017 DTaP/Tdap/Td series (2 - Td) 3/10/2024 Allergies as of 2/20/2017  Review Complete On: 2/20/2017 By: Estela Valle MD  
  
 Severity Noted Reaction Type Reactions Pcn [Penicillins] High 05/03/2010    Swelling Swelling throat Shellfish Containing Products High 03/06/2011    Swelling Swells throat and eyes Current Immunizations  Reviewed on 5/26/2016 Name Date Tdap 3/10/2014  5:21 PM  
  
 Not reviewed this visit You Were Diagnosed With   
  
 Codes Comments Controlled type 2 diabetes mellitus with other specified complication ([facility]    -  Primary ICD-10-CM: E11.69 Unspecified sleep apnea     ICD-10-CM: G47.30 ICD-9-CM: 780.57 Obesity (BMI 30-39. 9)     ICD-10-CM: E66.9 ICD-9-CM: 278.00 Snoring     ICD-10-CM: R06.83 
ICD-9-CM: 786.09 Vitals BP Pulse Temp Resp Height(growth percentile) Weight(growth percentile) 113/81 (BP 1 Location: Right arm, BP Patient Position: Sitting) (!) 58 98 °F (36.7 °C) (Oral) 18 5' 7\" (1.702 m) 247 lb (112 kg) SpO2 BMI OB Status Smoking Status 98% 38.69 kg/m2 Hysterectomy Never Smoker BMI and BSA Data Body Mass Index Body Surface Area  
 38.69 kg/m 2 2.3 m 2 Preferred Pharmacy Pharmacy Name Phone [facility] [place], [address] [facility] [phone] Your Updated Medication List  
  
   
This list is accurate as of: 2/20/17 11:44 AM.  Always use your most recent med list.  
  
  
  
  
 amitriptyline 25 mg tablet Commonly known as:  ELAVIL TAKE 1 TABLET BY MOUTH NIGHTLY  
  
 aspirin delayed-release 81 mg tablet Take 81 mg by mouth daily. atenolol 25 mg tablet Commonly known as:  TENORMIN  
take 1 tablet by mouth once daily BD LUER-MARIELY SYRINGE 3 mL 25 gauge x 1\" Syrg Generic drug:  Syringe with Needle (Disp) USE TO INJECT 1 ML EVERY 2 WEEKS  
  
 cyanocobalamin 1,000 mcg/mL injection Commonly known as:  VITAMIN B12  
EVERY OTHER WEEK  
  
 cyclobenzaprine 10 mg tablet Commonly known as:  FLEXERIL  
take 1 tablet by mouth three times a day if needed for muscle spasm FISH OIL PO Take  by mouth daily. furosemide 40 mg tablet Commonly known as:  LASIX  
take 1 tablet by mouth once daily  
  
 gabapentin 300 mg capsule Commonly known as:  NEURONTIN Take 1 Cap by mouth three (3) times daily. glimepiride 2 mg tablet Commonly known as:  AMARYL  
take 1 tablet twice a day  
  
 glucose blood VI test strips strip Commonly known as:  Ascensia CONTOUR  
E11.69 Test blood sugar 3 times a day HAIR,SKIN AND NAILS PO Take  by mouth daily. insulin glargine 100 unit/mL (3 mL) pen Commonly known as:  LANTUS SOLOSTAR INJECT 30 UNITS DAILY Lancets Misc E11.69 LINZESS 145 mcg Cap capsule Generic drug:  linaclotide TAKE ONE CAPSULE BY MOUTH EVERY DAY BEFORE BREAKFAST  
  
 loratadine 10 mg tablet Commonly known as:  Fay Martinet Take 1 Tab by mouth daily. metFORMIN  mg tablet Commonly known as:  GLUCOPHAGE XR  
take 1 tablet by mouth once daily  
  
 montelukast 10 mg tablet Commonly known as:  SINGULAIR  
take 1 tablet by mouth once daily MULTIVITAMIN PO Take  by mouth daily. NYSTOP powder Generic drug:  nystatin  
two (2) times a day. phentermine 37.5 mg tablet Commonly known as:  ADIPEX-P Take 1 Tab by mouth every morning. Max Daily Amount: 37.5 mg.  
  
 polyethylene glycol 17 gram packet Commonly known as:  Lindsay Look Take 1 Packet by mouth daily. potassium chloride 20 mEq tablet Commonly known as:  K-DUR, KLOR-CON  
take 1 tablet by mouth once daily  
  
 pravastatin 40 mg tablet Commonly known as:  PRAVACHOL Take 1 Tab by mouth nightly. CHANGE IN THERAPY topiramate 25 mg tablet Commonly known as:  TOPAMAX Take 1 Tab by mouth two (2) times daily (with meals). traZODone 100 mg tablet Commonly known as:  DESYREL  
take 1 tablet by mouth at bedtime VITAMIN D3 2,000 unit Cap capsule Generic drug:  Cholecalciferol (Vitamin D3)  
take 1 capsule by mouth once daily VITAMIN E PO Take  by mouth daily. Prescriptions Printed Refills  
 glucose blood VI test strips (ASCENSIA CONTOUR) strip 3 Sig: E11.69 Test blood sugar 3 times a day Class: Print Prescriptions Sent to Pharmacy Refills  
 topiramate (TOPAMAX) 25 mg tablet 6 Sig: Take 1 Tab by mouth two (2) times daily (with meals). Class: Normal  
 Pharmacy: [place] [address] Ph #: [phone] Route: Oral  
 Lancets misc 3 Sig: E11.69 Class: Normal  
 Pharmacy: [place] [address] Ph #: [phone] We Performed the Following SLEEP MEDICINE REFERRAL [VPM660 Custom] Comments:  
 eval and treat for sleep apnea, h/o witnessed pauses and loud snoring. R/o SONY. eval and treat Follow-up Instructions Return in about 2 weeks (around 3/6/2017). Referral Information Referral ID Referred By Referred To  
  
 [id number] [facility], [address] [facility], [address] [facility] [place] Phone: [phone] Fax: [phone] Visits Status Start Date End Date 1 New Request 2/20/17 2/20/18 If your referral has a status of pending review or denied, additional information will be sent to support the outcome of this decision. Introducing [facility]! Latoya Alex introduces [employer] patient portal. Now you can access parts of your medical record, email your doctor's office, and request medication refills online. 1. In your internet browser, go to [email or web address] 2. Click on the First Time User? Click Here link in the Sign In box. You will see the New Member Sign Up page. 3. Enter your [employer] Access Code exactly as it appears below. You will not need to use this code after youve completed the sign-up process. If you do not sign up before the expiration date, you must request a new code. · [employer] Access Code: [email or web address] Expires: 5/21/2017 11:44 AM 
 
4. Enter the last four digits of your Social Security Number (xxxx) and Date of Birth (mm/dd/yyyy) as indicated and click Submit. You will be taken to the next sign-up page. 5. Create a [employer] ID. This will be your [employer] login ID and cannot be changed, so think of one that is secure and easy to remember. 6. Create a [employer] password. You can change your password at any time. 7. Enter your Password Reset Question and Answer. This can be used at a later time if you forget your password. 8. Enter your e-mail address. You will receive e-mail notification when new information is available in [address] 9. Click Sign Up. You can now view and download portions of your medical record.  
10. Click the Download Summary menu link to download a portable copy of your medical information. If you have questions, please visit the Frequently Asked Questions section of the [employer] website. Remember, [employer] is NOT to be used for urgent needs. For medical emergencies, dial 911. Now available from your iPhone and Android! Please provide this summary of care documentation to your next provider. Your primary care clinician is listed as Marion Byrd. If you have any questions after today's visit, please call [phone]. Never

## 2022-03-18 PROBLEM — E11.9 WELL CONTROLLED TYPE 2 DIABETES MELLITUS (HCC): Status: ACTIVE | Noted: 2017-09-11

## 2022-03-19 PROBLEM — M51.36 DDD (DEGENERATIVE DISC DISEASE), LUMBAR: Status: ACTIVE | Noted: 2017-11-07

## 2022-03-20 ENCOUNTER — APPOINTMENT (OUTPATIENT)
Dept: GENERAL RADIOLOGY | Age: 47
End: 2022-03-20
Attending: EMERGENCY MEDICINE
Payer: MEDICARE

## 2022-03-20 ENCOUNTER — HOSPITAL ENCOUNTER (EMERGENCY)
Age: 47
Discharge: HOME | End: 2022-03-21
Attending: EMERGENCY MEDICINE | Admitting: EMERGENCY MEDICINE
Payer: MEDICARE

## 2022-03-20 VITALS
OXYGEN SATURATION: 99 % | TEMPERATURE: 97.8 F | DIASTOLIC BLOOD PRESSURE: 78 MMHG | SYSTOLIC BLOOD PRESSURE: 119 MMHG | RESPIRATION RATE: 18 BRPM | HEART RATE: 94 BPM

## 2022-03-20 DIAGNOSIS — S80.212A ABRASION OF LEFT KNEE, INITIAL ENCOUNTER: ICD-10-CM
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
## 2022-03-20 DIAGNOSIS — S20.219A CONTUSION OF CHEST WALL, UNSPECIFIED LATERALITY, INITIAL ENCOUNTER: ICD-10-CM

## 2022-03-20 PROBLEM — M48.02 CERVICAL STENOSIS OF SPINE: Status: ACTIVE | Noted: 2017-05-01

## 2022-03-20 PROBLEM — E11.40 TYPE 2 DIABETES MELLITUS WITH DIABETIC NEUROPATHY (HCC): Status: ACTIVE | Noted: 2019-03-26

## 2022-03-20 PROCEDURE — 73562 X-RAY EXAM OF KNEE 3: CPT

## 2022-03-20 PROCEDURE — 99283 EMERGENCY DEPT VISIT LOW MDM: CPT

## 2022-03-20 PROCEDURE — 71046 X-RAY EXAM CHEST 2 VIEWS: CPT

## 2022-03-20 RX ORDER — IBUPROFEN 400 MG/1
800 TABLET ORAL
Status: COMPLETED | OUTPATIENT
Start: 2022-03-20 | End: 2022-03-21

## 2022-03-20 RX ORDER — CYCLOBENZAPRINE HCL 10 MG
10 TABLET ORAL
Status: COMPLETED | OUTPATIENT
Start: 2022-03-20 | End: 2022-03-21

## 2022-03-21 PROCEDURE — 74011250637 HC RX REV CODE- 250/637: Performed by: EMERGENCY MEDICINE

## 2022-03-21 PROCEDURE — 74011000250 HC RX REV CODE- 250: Performed by: EMERGENCY MEDICINE

## 2022-03-21 RX ORDER — BACITRACIN 500 UNIT/G
1 PACKET (EA) TOPICAL ONCE
Status: COMPLETED | OUTPATIENT
Start: 2022-03-21 | End: 2022-03-21

## 2022-03-21 RX ORDER — IBUPROFEN 800 MG/1
800 TABLET ORAL
Qty: 20 TABLET | Refills: 0 | Status: SHIPPED | OUTPATIENT
Start: 2022-03-21 | End: 2022-03-28

## 2022-03-21 RX ORDER — CYCLOBENZAPRINE HCL 10 MG
10 TABLET ORAL
Qty: 15 TABLET | Refills: 0 | Status: SHIPPED | OUTPATIENT
Start: 2022-03-21

## 2022-03-21 RX ADMIN — BACITRACIN 1 PACKET: 500 OINTMENT TOPICAL at 00:38

## 2022-03-21 RX ADMIN — CYCLOBENZAPRINE 10 MG: 10 TABLET, FILM COATED ORAL at 00:38

## 2022-03-21 RX ADMIN — IBUPROFEN 800 MG: 400 TABLET ORAL at 00:38

## 2022-03-21 NOTE — ED NOTES
Left knee abrasion cleaned with sterile water, Baci applied and a bandaid. Pt given extra supplies to take home for wound care.
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
## 2022-03-21 NOTE — DISCHARGE INSTRUCTIONS
Xrays were reassuring tonight. YOu have bruised your chest wall and have an abrasion on your left knee. You will likely feel more sore tomorrow. Take the ibuprofen every 8 hours with food, and the flexeril every 8 hours for muscle spasms. You can also apply ice to the sore areas for the next 48 hours then switch to heat. If you develop any new pain that you feel is more then just muscle, severe headache, neck/back pain, etc. See your doctor or return here.
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
## 2022-04-04 ENCOUNTER — APPOINTMENT (OUTPATIENT)
Dept: GENERAL RADIOLOGY | Age: 47
End: 2022-04-04
Attending: PHYSICIAN ASSISTANT
Payer: MEDICARE

## 2022-04-04 ENCOUNTER — HOSPITAL ENCOUNTER (EMERGENCY)
Age: 47
Discharge: HOME | End: 2022-04-04
Attending: EMERGENCY MEDICINE | Admitting: EMERGENCY MEDICINE
Payer: MEDICARE

## 2022-04-04 VITALS
OXYGEN SATURATION: 99 % | DIASTOLIC BLOOD PRESSURE: 70 MMHG | WEIGHT: 230 LBS | BODY MASS INDEX: 36.96 KG/M2 | HEART RATE: 99 BPM | HEIGHT: 66 IN | TEMPERATURE: 98.9 F | SYSTOLIC BLOOD PRESSURE: 107 MMHG | RESPIRATION RATE: 18 BRPM
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
## 2022-04-04 DIAGNOSIS — S80.02XA CONTUSION OF LEFT KNEE, INITIAL ENCOUNTER: ICD-10-CM

## 2022-04-04 DIAGNOSIS — S60.221A CONTUSION OF RIGHT HAND, INITIAL ENCOUNTER: ICD-10-CM

## 2022-04-04 PROCEDURE — 99283 EMERGENCY DEPT VISIT LOW MDM: CPT

## 2022-04-04 PROCEDURE — 73562 X-RAY EXAM OF KNEE 3: CPT

## 2022-04-04 PROCEDURE — 73130 X-RAY EXAM OF HAND: CPT

## 2022-04-04 PROCEDURE — 74011250637 HC RX REV CODE- 250/637: Performed by: PHYSICIAN ASSISTANT

## 2022-04-04 RX ORDER — IBUPROFEN 600 MG/1
600 TABLET ORAL
Status: COMPLETED | OUTPATIENT
Start: 2022-04-04 | End: 2022-04-04

## 2022-04-04 RX ADMIN — IBUPROFEN 600 MG: 600 TABLET ORAL at 19:01
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
## 2022-09-29 ENCOUNTER — OFFICE VISIT (OUTPATIENT)
Dept: FAMILY MEDICINE CLINIC | Age: 47
End: 2022-09-29
Payer: MEDICARE

## 2022-09-29 VITALS
BODY MASS INDEX: 33.91 KG/M2 | DIASTOLIC BLOOD PRESSURE: 63 MMHG | OXYGEN SATURATION: 100 % | HEIGHT: 66 IN | SYSTOLIC BLOOD PRESSURE: 90 MMHG | RESPIRATION RATE: 16 BRPM | TEMPERATURE: 97.7 F | WEIGHT: 211 LBS | HEART RATE: 83 BPM

## 2022-09-29 DIAGNOSIS — E78.00 HIGH CHOLESTEROL: ICD-10-CM

## 2022-09-29 DIAGNOSIS — E11.9 WELL CONTROLLED TYPE 2 DIABETES MELLITUS (HCC): ICD-10-CM

## 2022-09-29 DIAGNOSIS — I10 HTN, GOAL BELOW 140/90: Primary | ICD-10-CM

## 2022-09-29 PROCEDURE — 3044F HG A1C LEVEL LT 7.0%: CPT | Performed by: FAMILY MEDICINE

## 2022-09-29 PROCEDURE — G8427 DOCREV CUR MEDS BY ELIG CLIN: HCPCS | Performed by: FAMILY MEDICINE

## 2022-09-29 PROCEDURE — 99214 OFFICE O/P EST MOD 30 MIN: CPT | Performed by: FAMILY MEDICINE

## 2022-09-29 PROCEDURE — G9717 DOC PT DX DEP/BP F/U NT REQ: HCPCS | Performed by: FAMILY MEDICINE

## 2022-09-29 PROCEDURE — G8752 SYS BP LESS 140: HCPCS | Performed by: FAMILY MEDICINE

## 2022-09-29 PROCEDURE — G8754 DIAS BP LESS 90: HCPCS | Performed by: FAMILY MEDICINE

## 2022-09-29 PROCEDURE — G8417 CALC BMI ABV UP PARAM F/U: HCPCS | Performed by: FAMILY MEDICINE

## 2022-09-29 PROCEDURE — 2022F DILAT RTA XM EVC RTNOPTHY: CPT | Performed by: FAMILY MEDICINE

## 2022-09-29 RX ORDER — TEMAZEPAM 15 MG/1
20 CAPSULE ORAL
COMMUNITY
Start: 2022-02-07 | End: 2022-10-05

## 2022-09-29 RX ORDER — PEN NEEDLE, DIABETIC 31 GX3/16"
NEEDLE, DISPOSABLE MISCELLANEOUS
COMMUNITY
Start: 2021-05-25

## 2022-09-29 RX ORDER — LIRAGLUTIDE 6 MG/ML
INJECTION SUBCUTANEOUS
COMMUNITY
Start: 2022-09-21

## 2022-09-29 RX ORDER — FLUOXETINE HYDROCHLORIDE 40 MG/1
40 CAPSULE ORAL DAILY
COMMUNITY
Start: 2022-07-13

## 2022-09-29 RX ORDER — LUBIPROSTONE 8 UG/1
CAPSULE, GELATIN COATED ORAL
COMMUNITY
Start: 2022-06-08

## 2022-09-29 NOTE — PROGRESS NOTES
Chief Complaint   Patient presents with    Diabetes    Weight Loss    Medication Refill     she is a 41y.o. year old female who presents for evalution. She is taking 1.8 victoza daily  She is not checking FSBS at home  Her last a1c was done last year in September  She has not been seen here since 2019  She has mild devorah but never got the machine  She has lost 30 lbs since 2019  She has a h/o cspine surgery  She has another c apine vertebra that needs surgery  She is not sure if there Is a compression fracture or spinal stenosis  C/o constipation  Using detox barrientos  Uses castor oil and that stopped working  Miralax takes a week to work    She has linzess from a GI doctor but has not been seeing him        She has pan folds since the weight loss  She sometimes gets a rash under it   Non rash is present right now    Reviewed PmHx, RxHx, FmHx, SocHx, AllgHx and updated and dated in the chart.     Aspirin yes ____   No____ N/A____    Patient Active Problem List    Diagnosis    Type 2 diabetes mellitus with diabetic neuropathy ([address]    Fibromyalgia    DDD (degenerative disc disease), lumbar    Well controlled type 2 diabetes mellitus ([address]    Cervical stenosis of spine    Chronic neck pain    DEVORAH (obstructive sleep apnea)    DDD (degenerative disc disease), cervical     followed by ortho      Arthritis     lower back      Asthma     last attack 1994      Bipolar 1 disorder (HCC)    GERD (gastroesophageal reflux disease)    High cholesterol    HTN, goal below 140/90    Migraines    Morbid obesity ([address]    Unspecified sleep apnea     no cpap-NEVER TESTED      Diabetes mellitus type 2, controlled ([address]     a1c 7.4% at outside EMR         Nurse notes were reviewed and copied and are correct  Review of Systems - negative except as listed above in the HPI    Objective:     Vitals:    09/29/22 0920   BP: 90/63   Pulse: 83   Resp: 16   Temp: 97.7 °F (36.5 °C)   TempSrc: Skin   SpO2: 100%   Weight: 211 lb (95.7 kg)   Height: 5' 6\" (1.676 m)     Physical Examination: General appearance - alert, well appearing, and in no distress  Mental status - alert, oriented to person, place, and time  Eyes - pupils equal and reactive, extraocular eye movements intact  Ears - bilateral TM's and external ear canals normal  Neck - supple, no significant adenopathy  Chest - clear to auscultation, no wheezes, rales or rhonchi, symmetric air entry  Heart - normal rate, regular rhythm, normal S1, S2, no murmurs, rubs, clicks or gallops  Extremities - peripheral pulses normal, no pedal edema, no clubbing or cyanosis         Assessment/ Plan:   Diagnoses and all orders for this visit:    1. HTN, goal below 631/86  -     METABOLIC PANEL, COMPREHENSIVE; Future  Atenolol 25 ea day    2. Well controlled type 2 diabetes mellitus ([facility]  -     HEMOGLOBIN A1C WITH EAG; Future  Cont all meds Amaryl 2 mg bid  Lantus 30 units ea day  Metformin 500 mg 2 tab ea day  Victoza   3. High cholesterol  -     LIPID PANEL; Future   Cont pravachol      ICD-10-CM ICD-9-CM    1. HTN, goal below 140/90  P47 965.5 METABOLIC PANEL, COMPREHENSIVE      2. Well controlled type 2 diabetes mellitus ([facility]  E11.9 250.00 HEMOGLOBIN A1C WITH EAG      3. High cholesterol  E78.00 272.0 LIPID PANEL          I have discussed the diagnosis with the patient and the intended plan as seen in the above orders. The patient has received an after-visit summary  if not on [facility] and questions were answered concerning future plans. Patients in [facility] have access to avs without printing    Medication Side Effects and Warnings were discussed with patient:   Patient Labs were reviewed and or requested:   Patient Past Records were reviewed and or requested: yes        There are no Patient Instructions on file for this visit.

## 2022-09-29 NOTE — PROGRESS NOTES
1. Have you been to the ER, urgent care clinic since your last visit? Hospitalized since your last visit? No    2. Have you seen or consulted any other health care providers outside of the [address] [place] [address] since your last visit? Include any pap smears or colon screening. No    Chief Complaint   Patient presents with    Diabetes    Weight Loss    Medication Refill     Health Maintenance Due   Topic Date Due    COVID-19 Vaccine (1) Never done    Pneumococcal 0-64 years (1 - PCV) Never done    Eye Exam Retinal or Dilated  Never done    Depression Monitoring  Never done    Lipid Screen  11/14/2019    MICROALBUMIN Q1  12/05/2019    Medicare Yearly Exam  03/26/2020    Foot Exam Q1  03/26/2020    A1C test (Diabetic or Prediabetic)  03/26/2020    Flu Vaccine (1) Never done     3 most recent PHQ Screens 9/29/2022   Little interest or pleasure in doing things Several days   Feeling down, depressed, irritable, or hopeless Several days   Total Score PHQ 2 2     Abuse Screening Questionnaire 9/29/2022   Do you ever feel afraid of your partner? N   Are you in a relationship with someone who physically or mentally threatens you? N   Is it safe for you to go home?  Y     Visit Vitals  BP 90/63 (BP 1 Location: Left upper arm, BP Patient Position: Sitting, BP Cuff Size: Adult)   Pulse 83   Temp 97.7 °F (36.5 °C) (Skin)   Resp 16   Ht 5' 6\" (1.676 m)   Wt 211 lb (95.7 kg)   SpO2 100%   BMI 34.06 kg/m²

## 2022-09-30 LAB
ALBUMIN SERPL-MCNC: 3.6 G/DL (ref 3.5–5)
ALBUMIN/GLOB SERPL: 1.1 {RATIO} (ref 1.1–2.2)
ALP SERPL-CCNC: 58 U/L (ref 45–117)
ALT SERPL-CCNC: 47 U/L (ref 12–78)
ANION GAP SERPL CALC-SCNC: 9 MMOL/L (ref 5–15)
AST SERPL-CCNC: 28 U/L (ref 15–37)
BILIRUB SERPL-MCNC: 0.2 MG/DL (ref 0.2–1)
BUN SERPL-MCNC: 12 MG/DL (ref 6–20)
BUN/CREAT SERPL: 14 (ref 12–20)
CALCIUM SERPL-MCNC: 8.9 MG/DL (ref 8.5–10.1)
CHLORIDE SERPL-SCNC: 111 MMOL/L (ref 97–108)
CHOLEST SERPL-MCNC: 176 MG/DL
CO2 SERPL-SCNC: 20 MMOL/L (ref 21–32)
CREAT SERPL-MCNC: 0.88 MG/DL (ref 0.55–1.02)
EST. AVERAGE GLUCOSE BLD GHB EST-MCNC: 114 MG/DL
GLOBULIN SER CALC-MCNC: 3.3 G/DL (ref 2–4)
GLUCOSE SERPL-MCNC: 87 MG/DL (ref 65–100)
HBA1C MFR BLD: 5.6 % (ref 4–5.6)
HDLC SERPL-MCNC: 67 MG/DL
HDLC SERPL: 2.6 {RATIO} (ref 0–5)
LDLC SERPL CALC-MCNC: 99.2 MG/DL (ref 0–100)
POTASSIUM SERPL-SCNC: 4 MMOL/L (ref 3.5–5.1)
PROT SERPL-MCNC: 6.9 G/DL (ref 6.4–8.2)
SODIUM SERPL-SCNC: 140 MMOL/L (ref 136–145)
TRIGL SERPL-MCNC: 49 MG/DL (ref ?–150)
VLDLC SERPL CALC-MCNC: 9.8 MG/DL

## 2022-10-13 NOTE — PROGRESS NOTES
The kidney and liver tests are normal  The blood sugar a1c is now normal  The cholesterol level is normal

## 2022-12-14 NOTE — DISCHARGE SUMMARY
[address] [facility]   [address] [facility] [address]    DISCHARGE SUMMARY     Patient: Morgan Spaulding                             Medical Record Number: [id number]                : 1975  Age: 38 y.o. Admit Date: 2017  Discharge Date:11/10/2017   Admission Diagnosis: DDD L5-S1, FORAMINAL STENOSIS L5-S1  DDD (degenerative disc disease), lumbar  Discharge Diagnosis: DDD L5-S1, FORAMINAL STENOSIS L5-S1  Procedures: Procedure(s):  LUMBAR LAMINECTOMY, POSTERIOR SPINAL FUSION L5-S1  Surgeon: AVIVA Daniels MD  Assistants:  Sharad Hernandez PA-C  Anesthesia: general  Complications: None     History of Present Illness:  Morgan Spaulding is a 82-year-old obese female with persistent complaints of back and right-sided leg pain. Distribution was initially mechanical over a period of time has become radicular. She is a very large lady, weighing approximately 240 pounds. Given the extent of her pain and dysfunction, and those findings clinically and radiologically and need for a very wide decompression, a lumbar decompression coupled with the spinal fusion offered. Potential benefits and complications reviewed. Hospital Course:  Morgan Spaulding tolerated the procedure well. She was transferred to the Spinal Unit from the recovery room in stable condition. On postoperative day 1, the dressing was clean and dry, she was neurovascularly intact and afebrile, and her vital signs were stable. Hemoglobin prior to discharge were   Lab Results   Component Value Date/Time    HGB 10.0 2017 06:15 AM      On post-op day #2, the patient continued to make progress, working with PT/OT -walking and navigating stairs. Morgan Spaulding made excellent progress with physical therapy and was discharged to Home in stable condition on postoperative day 3. She was given routine postoperative instructions and advised to follow up in the office in 2 weeks following discharge home.   She was given the following prescriptions for pain medications. Discharge Medications:     Current Discharge Medication List      START taking these medications    Details   gabapentin (NEURONTIN) 400 mg capsule Take 1 Cap by mouth two (2) times a day. Qty: 30 Cap, Refills: 0      methocarbamol (ROBAXIN) 750 mg tablet Take 1 Tab by mouth three (3) times daily. Qty: 45 Tab, Refills: 0      oxyCODONE IR (ROXICODONE) 10 mg tab immediate release tablet Take 1 Tab by mouth every four (4) hours as needed. Max Daily Amount: 60 mg.  Qty: 60 Tab, Refills: 0         CONTINUE these medications which have NOT CHANGED    Details   topiramate (TOPAMAX) 25 mg tablet TAKE 1 TABLET TWICE A DAY WITH MEALS  Qty: 60 Tab, Refills: 4    Associated Diagnoses: Obesity (BMI 30-39.9)      traZODone (DESYREL) 100 mg tablet take 1 tablet by mouth at bedtime  Qty: 90 Tab, Refills: 3      pravastatin (PRAVACHOL) 40 mg tablet TAKE 1 TABLET BY MOUTH IN THE EVENING  Qty: 90 Tab, Refills: 0    Associated Diagnoses: Hyperlipidemia LDL goal <100      montelukast (SINGULAIR) 10 mg tablet take 1 tablet by mouth once daily  Qty: 30 Tab, Refills: 6      potassium chloride (K-DUR, KLOR-CON) 20 mEq tablet take 1 tablet by mouth once daily  Qty: 30 Tab, Refills: 5      linaclotide (LINZESS) 145 mcg cap capsule Take 1 Cap by mouth Daily (before breakfast).   Qty: 30 Cap, Refills: 2    Associated Diagnoses: Constipation, unspecified constipation type      furosemide (LASIX) 40 mg tablet take 1 tablet by mouth once daily  Qty: 30 Tab, Refills: 1    Comments: pt needs to schedile appt      atenolol (TENORMIN) 25 mg tablet take 1 tablet by mouth once daily  Qty: 30 Tab, Refills: 3         STOP taking these medications       traMADol (ULTRAM) 50 mg tablet Comments:   Reason for Stopping:         phentermine (ADIPEX-P) 37.5 mg tablet Comments:   Reason for Stopping:         insulin glargine (LANTUS SOLOSTAR) 100 unit/mL (3 mL) inpn Comments:   Reason for Stopping: Cholecalciferol, Vitamin D3, (VITAMIN D3) 2,000 unit cap capsule Comments:   Reason for Stopping:         glimepiride (AMARYL) 2 mg tablet Comments:   Reason for Stopping:         diclofenac EC (VOLTAREN) 75 mg EC tablet Comments:   Reason for Stopping:         loratadine (CLARITIN) 10 mg tablet Comments:   Reason for Stopping:         glucose blood VI test strips (ASCENSIA CONTOUR) strip Comments:   Reason for Stopping:         Lancets misc Comments:   Reason for Stopping:         cyclobenzaprine (FLEXERIL) 10 mg tablet Comments:   Reason for Stopping:         metFORMIN ER (GLUCOPHAGE XR) 500 mg tablet Comments:   Reason for Stopping:         polyethylene glycol (MIRALAX) 17 gram packet Comments:   Reason for Stopping:         MULTIVITAMIN PO Comments:   Reason for Stopping:         MULTIVITAMIN WITH MINERALS (HAIR,SKIN AND NAILS PO) Comments:   Reason for Stopping:         DOCOSAHEXANOIC ACID/EPA (FISH OIL PO) Comments:   Reason for Stopping:         VITAMIN E ACETATE (VITAMIN E PO) Comments:   Reason for Stopping:         BD LUER-MARIELY SYRINGE 3 mL 25 gauge x 1\" syrg Comments:   Reason for Stopping:         NYSTOP powder Comments:   Reason for Stopping:                   Signed by: Neha Hernandez PA-C  11/10/2017 Pulmonology

## 2022-12-23 ENCOUNTER — OFFICE VISIT (OUTPATIENT)
Dept: OBGYN CLINIC | Age: 47
End: 2022-12-23
Payer: MEDICARE

## 2022-12-23 VITALS — SYSTOLIC BLOOD PRESSURE: 133 MMHG | BODY MASS INDEX: 35.51 KG/M2 | DIASTOLIC BLOOD PRESSURE: 70 MMHG | WEIGHT: 220 LBS

## 2022-12-23 DIAGNOSIS — N61.1 ABSCESS OF SKIN OF BREAST: Primary | ICD-10-CM

## 2022-12-23 RX ORDER — SULFAMETHOXAZOLE AND TRIMETHOPRIM 800; 160 MG/1; MG/1
1 TABLET ORAL 2 TIMES DAILY
Qty: 20 TABLET | Refills: 0 | Status: SHIPPED | OUTPATIENT
Start: 2022-12-23 | End: 2023-01-02

## 2022-12-23 NOTE — PROGRESS NOTES
Barbara Fenton is a 41 y.o. 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 The patient is not currently sexually active. She developed this problem approximately 2 weeks ago.        Her relevant past medical history:   Past Medical History:   Diagnosis Date    Arthritis     lower back    Asthma     last attack     Bipolar 1 disorder ([facility]     Chronic neck pain     DDD (degenerative disc disease), cervical     followed by ortho    Diabetes mellitus type 2, controlled ([facility]     Fibromyalgia     GERD (gastroesophageal reflux disease)     High cholesterol     HTN, goal below 140/90     Migraines     Morbid obesity (HCC)     SONY (obstructive sleep apnea)     DOESN'T USE CPAP        Past Surgical History:   Procedure Laterality Date    COLONOSCOPY N/A 2019    COLONOSCOPY performed by Jose Panda MD at [address] [facility]  2014    right excision- sebaceous cyst    HX  SECTION      HX HEENT      wisdom teeth    HX ORTHOPAEDIC  2017    CERVICAL FUSION    HX OTHER SURGICAL      spinal tap - viral menigitis    HX OTHER SURGICAL      SEBACEOUS CYST-BACK    HX TOTAL LAPAROSCOPIC HYSTERECTOMY W/ BS&O      MO BREAST SURGERY PROCEDURE UNLISTED  13     RIGHT BREAST EXCISION OF MULTIPLE SEBACEOUS CYSTS     Social History     Occupational History    Not on file   Tobacco Use    Smoking status: Never    Smokeless tobacco: Never   Substance and Sexual Activity    Alcohol use: Yes     Comment: SOCIAL     Drug use: No    Sexual activity: Not Currently     Birth control/protection: None, Surgical     Family History   Problem Relation Age of Onset    Cancer Maternal Aunt         OVARIAN;     Cancer Maternal Grandmother         bone cancer    Hypertension Mother Diabetes Mother     Hypertension Father     Heart Disease Father         CHF    Cancer Paternal Grandmother 79        breast cancer    Breast Cancer Paternal Grandmother     Anesth Problems Neg Hx        Allergies   Allergen Reactions    Pcn [Penicillins] Swelling     Swelling throat    Shellfish Containing Products Swelling     Swells throat and eyes    Morphine Other (comments)     FAST HEARTBEAT AND NAUSEA PER PATIENT     Prior to Admission medications    Medication Sig Start Date End Date Taking? Authorizing Provider   Victoza 3-Sher 0.6 mg/0.1 mL (18 mg/3 mL) pnij  9/21/22  Yes Provider, Historical   FLUoxetine (PROzac) 40 mg capsule Take 40 mg by mouth daily. 7/13/22  Yes Provider, Historical   lubiPROStone (AMITIZA) 8 mcg capsule  6/8/22  Yes Provider, Historical   Insulin Needles, Disposable, 32 gauge x 5/32\" ndle  5/25/21  Yes Provider, Historical   cyclobenzaprine (FLEXERIL) 10 mg tablet Take 1 Tablet by mouth three (3) times daily as needed for Muscle Spasm(s). 3/21/22  Yes Darlene Starks MD   atenoloL (TENORMIN) 25 mg tablet TAKE 1 TABLET BY MOUTH ONCE A DAY AS DIRECTED 5/6/20  Yes Caroline Ponce MD   loratadine (CLARITIN) 10 mg tablet Take 1 Tab by mouth daily. 2/17/20  Yes John Woodard PA-C   topiramate (TOPAMAX) 25 mg tablet TAKE 1 TABLET BY MOUTH TWICE A DAY WITH MEALS  Patient taking differently: 50 mg. TAKE 1 TABLET BY MOUTH TWICE A DAY WITH MEALS 11/29/19  Yes Caroline Ponce MD   traZODone (DESYREL) 100 mg tablet TAKE 1 TABLET BY MOUTH EVERY DAY AT BEDTIME  Patient taking differently: 150 mg. TAKE 1 TABLET BY MOUTH EVERY DAY AT BEDTIME 7/25/19  Yes Caroline Ponce MD   pravastatin (PRAVACHOL) 40 mg tablet TAKE 1 TABLET BY MOUTH EVERY EVENING  Patient taking differently: 80 mg. 7/6/19  Yes Caroline Ponce MD   potassium chloride (KLOR-CON M20) 20 mEq tablet TAKE 1 TABLET BY MOUTH EVERY DAY 6/19/19  Yes Caroline Ponce MD   NYSTOP powder Apply  to affected area two (2) times a day.  11/14/18  Yes Evie Arciniega MD   montelukast (SINGULAIR) 10 mg tablet take 1 tablet by mouth once daily 8/11/16  Yes Maria Isabel Rowe DO   naproxen (NAPROSYN) 500 mg tablet Take 1 Tab by mouth two (2) times daily as needed for Pain. 10/23/20   Lisette Huerta PA-C   ondansetron (ZOFRAN ODT) 4 mg disintegrating tablet Take 1 Tab by mouth every eight (8) hours as needed for Nausea. 10/23/20   Lisette Huerta PA-C   fluticasone propionate (FLONASE) 50 mcg/actuation nasal spray 2 Sprays by Both Nostrils route daily. 2/17/20   Lisette Huerta PA-C   dextromethorphan-guaiFENesin (CORICIDIN HBP CHEST MARIE-COUGH)  mg cap Take 1 Cap by mouth every six (6) hours as needed for Cough. 2/17/20   Lisette Huerta PA-C   metFORMIN ER (GLUCOPHAGE XR) 500 mg tablet TAKE 2 TABS BY MOUTH DAILY (WITH DINNER). 1/21/20   Evie Arciniega MD   LINZESS 145 mcg cap capsule TAKE ONE CAPSULE BY MOUTH EVERY DAY BEFORE BREAKFAST 12/13/19   Evie Arciniega MD   clonazePAM (KLONOPIN) 0.5 mg tablet Take 0.5 mg by mouth nightly as needed. Carmita, MD Alma   cholecalciferol (VITAMIN D3) 2,000 unit cap capsule TAKE ONE CAPSULE BY MOUTH EVERY DAY 10/23/19   Evie Arciniega MD   polyethylene glycol (MIRALAX) 17 gram packet MIX AND TAKE 1 PACKET BY MOUTH EVERY DAY 8/1/19   Evie Arciniega MD   insulin glargine (LANTUS,BASAGLAR) 100 unit/mL (3 mL) inpn 30 Units by SubCUTAneous route daily. 7/28/19   Evie Arciniega MD   glimepiride (AMARYL) 2 mg tablet TAKE 1 TABLET BY MOUTH TWICE A DAY 4/30/19   Evie Arciniega MD   glucose blood VI test strips (ASCENSIA CONTOUR) strip TEST BLOOD SUGAR THREE TIMES DAILY.  Dx:E11.9 1/2/19   Evie Arciniega MD   flash glucose scanning reader (FREESTYLE ROSALINE 10 DAY READER) misc Check blood sugar daily insulin requiring type 2 diabetyes 11/14/18   Evie Arciniega MD        Review of Systems - History obtained from the patient  Constitutional: negative for weight loss, fever, night sweats  HEENT: negative for hearing loss, earache, congestion, snoring, sorethroat  CV: negative for chest pain, palpitations, edema  Resp: negative for cough, shortness of breath, wheezing  Breast: negative for breast lumps, nipple discharge, galactorrhea  GI: negative for change in bowel habits, abdominal pain, black or bloody stools  : negative for frequency, dysuria, hematuria  MSK: negative for back pain, joint pain, muscle pain  Skin: negative for itching, rash, hives  Neuro: negative for dizziness, headache, confusion, weakness  Psych: negative for anxiety, depression, change in mood  Heme/lymph: negative for bleeding, bruising, pallor      Objective:  Visit Vitals  /70   Wt 220 lb (99.8 kg)   BMI 35.51 kg/m²          PHYSICAL EXAMINATION    Constitutional  Appearance: well-nourished, well developed, alert, in no acute distress    HENT  Head and Face: appears normal    Neck  Inspection/Palpation: normal appearance    Breasts  Inspection of Breasts: breasts symmetrical, no skin changes, no discharge present, nipple appearance normal, no skin retraction present  Palpation of Breasts and Axillae: no masses present on palpation, no breast tenderness  Axillary Lymph Nodes: no lymphadenopathy present  Under Right breast hard cystic structure present painful to Palp with white/core noted            Skin  General Inspection: no rash, pos lesion identified under Right breast in bra-line    Neurologic/Psychiatric  Mental Status:  Orientation: grossly oriented to person, place and time  Mood and Affect: mood normal, affect appropriate    Assessment:      Infection vs. Cystic structure  Plan:   Rx Bactrim DS #20 BID x  10 days  Warm soaks TID  Referral   I&D today  Culture sent     [facility]  OFFICE PROCEDURE PROGRESS NOTE        Chart reviewed for the following:   INichole, have reviewed the History, Physical and updated the Allergic reactions for Germika L Bette     TIME OUT performed immediately prior to start of procedure:   Pat Ervin, have performed the following reviews on Kel Amos prior to the start of the procedure:            * Patient was identified by name and date of birth   * Agreement on procedure being performed was verified  * Risks and Benefits explained to the patient  * Consent was signed and verified     Time: 9:30am    Date of procedure: 12/23/2022    Procedure performed by:  Nik Garcia CNM    Provider assisted by: Yannick Garcia. Krystina Bennett MA    Patient assisted by: self    How tolerated by patient: tolerated the procedure well with no complications    Post Procedural Pain Scale: 2 - Hurts Little Bit    Comments: none    Procedure note: Incision and drainage    Maria Teresa Soto is a 41 y.o. female BLACK/ who presents today with a tender and indurated on the rightside of her breast.She has elected to have incision and drainage today. The risks, benefits and assets of the procedure were discussed. Her questions were answered, informed consent was obtained, the informed consent document was signed, and she had no further questions. Procedure: The area was prepped with betadine. Following the prep, 3 cc's of 1% Xylocaine was injected into the skin over the lesion area. After adequate anesthesia was obtained, a number 11 scalpel was used to open the abscess. Approximately 7 cc's of purulent fluid was released. Large core deeper in breast noted, with no further purulent drainage noted Bleeding was minimal. Steri-Strip applied  POST PROCEDURE: The patient tolerated the procedure well. There were no complications. She reports some relief in her symptoms following the procedure. WINTER Bennett/MARIAN

## 2022-12-25 LAB
BACTERIA SPEC CULT: NORMAL
GRAM STN SPEC: NORMAL
GRAM STN SPEC: NORMAL
SERVICE CMNT-IMP: NORMAL

## 2022-12-28 ENCOUNTER — OFFICE VISIT (OUTPATIENT)
Dept: SURGERY | Age: 47
End: 2022-12-28
Payer: MEDICARE

## 2022-12-28 VITALS — BODY MASS INDEX: 35.36 KG/M2 | WEIGHT: 220 LBS | HEIGHT: 66 IN

## 2022-12-28 DIAGNOSIS — N60.81 SEBACEOUS CYST OF SKIN OF RIGHT BREAST: Primary | ICD-10-CM

## 2022-12-28 PROCEDURE — G9717 DOC PT DX DEP/BP F/U NT REQ: HCPCS | Performed by: SURGERY

## 2022-12-28 PROCEDURE — G8427 DOCREV CUR MEDS BY ELIG CLIN: HCPCS | Performed by: SURGERY

## 2022-12-28 PROCEDURE — 99203 OFFICE O/P NEW LOW 30 MIN: CPT | Performed by: SURGERY

## 2022-12-28 PROCEDURE — G8417 CALC BMI ABV UP PARAM F/U: HCPCS | Performed by: SURGERY

## 2022-12-28 NOTE — PROGRESS NOTES
HISTORY OF PRESENT ILLNESS  Grayling Duane is a 41 y.o. female. HPI NEW patient consult referred by  Dr. Arley Morel for RIGHT breast cyst. Has noticed the area for about two weeks and has been growing in size. Having pain with the area as well. Has not been taking bactrim. 12/23/22- Was given bactrim for 10 days by GYN and no growth on culture after I&D    Family History:  Paternal grandmother- Breast cancer  Maternal Aunt- Ovarian cancer passed   Maternal Aunt- Colon cancer     Breast imaging-   [facility] Results (most recent):  Results from Hospital Encounter encounter on 04/06/21    [facility] 3D MARIFER W MAMMO BI SCREENING INCL CAD    Narrative  STUDY: Bilateral digital screening mammogram with 3-D tomosynthesis    INDICATION:  Screening. COMPARISON: 2019, 2016    BREAST COMPOSITION: There are scattered areas of fibroglandular density. FINDINGS: Bilateral digital screening mammography was performed and is  interpreted in conjunction with a computer assisted detection (CAD) system. Additionally, tomosynthesis of both breasts in the CC and MLO projections was  performed. No suspicious masses or calcifications are identified. There has been  no significant change. Impression  BI-RADS 1: Negative. No mammographic evidence of malignancy. RECOMMENDATIONS:  Next screening mammogram is recommended in one year. The patient will be notified of these results.         ROS    Physical Exam    ASSESSMENT and PLAN  {ASSESSMENT/PLAN:[id number]}

## 2022-12-30 NOTE — PROGRESS NOTES
HISTORY OF PRESENT ILLNESS  Maria Luisa Carpenter is a 41 y.o. female. NEW patient consult referred by  Dr. Geraldo Chaudhry for RIGHT breast cyst. Has noticed the area for about two weeks and has been growing in size. Having pain with the area as well. Has not been taking bactrim. 12/23/22- Was given bactrim for 10 days by GYN and no growth on culture after I&D     Family History:  Paternal grandmother- Breast cancer  Maternal Aunt- Ovarian cancer passed   Maternal Aunt- Colon cancer      Breast imaging-   [facility] Results (most recent):  Results from Hospital Encounter encounter on 04/06/21     [facility] 3D MARIFER W MAMMO BI SCREENING INCL CAD     Narrative  STUDY: Bilateral digital screening mammogram with 3-D tomosynthesis     INDICATION:  Screening. COMPARISON: 2019, 2016     BREAST COMPOSITION: There are scattered areas of fibroglandular density. FINDINGS: Bilateral digital screening mammography was performed and is  interpreted in conjunction with a computer assisted detection (CAD) system. Additionally, tomosynthesis of both breasts in the CC and MLO projections was  performed. No suspicious masses or calcifications are identified. There has been  no significant change. Impression  BI-RADS 1: Negative. No mammographic evidence of malignancy. RECOMMENDATIONS:  Next screening mammogram is recommended in one year. The patient will be notified of these results.            Past Medical History:   Diagnosis Date    Arthritis     lower back    Asthma     last attack 1994    Bipolar 1 disorder ([address]     Chronic neck pain     DDD (degenerative disc disease), cervical     followed by ortho    Diabetes mellitus type 2, controlled ([address]     Fibromyalgia     GERD (gastroesophageal reflux disease)     High cholesterol     HTN, goal below 140/90     Migraines     Morbid obesity (HCC)     SONY (obstructive sleep apnea)     DOESN'T USE CPAP       Past Surgical History:   Procedure Laterality Date    COLONOSCOPY N/A 2019    COLONOSCOPY performed by Vinny Wallace MD at [facility] ENDOSCOPY    HX BREAST BIOPSY  2014    right excision- sebaceous cyst    HX  SECTION      HX HEENT      wisdom teeth    HX ORTHOPAEDIC  2017    CERVICAL FUSION    HX OTHER SURGICAL      spinal tap - viral menigitis    HX OTHER SURGICAL      SEBACEOUS CYST-BACK    HX TOTAL LAPAROSCOPIC HYSTERECTOMY W/ BS&O      VT BREAST SURGERY PROCEDURE UNLISTED  13     RIGHT BREAST EXCISION OF MULTIPLE SEBACEOUS CYSTS       Social History     Socioeconomic History    Marital status:      Spouse name: Not on file    Number of children: Not on file    Years of education: Not on file    Highest education level: Not on file   Occupational History    Not on file   Tobacco Use    Smoking status: Never    Smokeless tobacco: Never   Substance and Sexual Activity    Alcohol use: Yes     Comment: SOCIAL     Drug use: No    Sexual activity: Not Currently     Birth control/protection: None, Surgical   Other Topics Concern    Not on file   Social History Narrative    Not on file     Social Determinants of Health     Financial Resource Strain: Not on file   Food Insecurity: Not on file   Transportation Needs: Not on file   Physical Activity: Not on file   Stress: Not on file   Social Connections: Not on file   Intimate Partner Violence: Not on file   Housing Stability: Not on file       Current Outpatient Medications on File Prior to Visit   Medication Sig Dispense Refill    trimethoprim-sulfamethoxazole (BACTRIM DS, SEPTRA DS) 160-800 mg per tablet Take 1 Tablet by mouth two (2) times a day for 10 days. 20 Tablet 0    Victoza 3-Sher 0.6 mg/0.1 mL (18 mg/3 mL) pnij       FLUoxetine (PROzac) 40 mg capsule Take 40 mg by mouth daily.       lubiPROStone (AMITIZA) 8 mcg capsule       Insulin Needles, Disposable, 32 gauge x \" ndle       cyclobenzaprine (FLEXERIL) 10 mg tablet Take 1 Tablet by mouth three (3) times daily as needed for Muscle Spasm(s). 15 Tablet 0    atenoloL (TENORMIN) 25 mg tablet TAKE 1 TABLET BY MOUTH ONCE A DAY AS DIRECTED 15 Tab 0    loratadine (CLARITIN) 10 mg tablet Take 1 Tab by mouth daily. 20 Tab 0    topiramate (TOPAMAX) 25 mg tablet TAKE 1 TABLET BY MOUTH TWICE A DAY WITH MEALS (Patient taking differently: 50 mg. TAKE 1 TABLET BY MOUTH TWICE A DAY WITH MEALS) 180 Tab 0    traZODone (DESYREL) 100 mg tablet TAKE 1 TABLET BY MOUTH EVERY DAY AT BEDTIME (Patient taking differently: 150 mg. TAKE 1 TABLET BY MOUTH EVERY DAY AT BEDTIME) 90 Tab 3    pravastatin (PRAVACHOL) 40 mg tablet TAKE 1 TABLET BY MOUTH EVERY EVENING (Patient taking differently: 80 mg.) 90 Tab 3    potassium chloride (KLOR-CON M20) 20 mEq tablet TAKE 1 TABLET BY MOUTH EVERY DAY 90 Tab 0    NYSTOP powder Apply  to affected area two (2) times a day. 60 g 5    montelukast (SINGULAIR) 10 mg tablet take 1 tablet by mouth once daily 30 Tab 6     No current facility-administered medications on file prior to visit. Allergies   Allergen Reactions    Pcn [Penicillins] Swelling     Swelling throat    Shellfish Containing Products Swelling     Swells throat and eyes    Morphine Other (comments)     FAST HEARTBEAT AND NAUSEA PER PATIENT       OB History          2    Para        Term                AB        Living   2         SAB        IAB        Ectopic        Molar        Multiple        Live Births              Obstetric Comments   Menarche:  17. LMP: 36.  # of Children:  2. Age at Delivery of First Child:  25.   Hysterectomy/oophorectomy:  Yes/NO. Breast Bx:  Yes left   Hx of Breast Feeding:  no. BCP:  yes. Hormone therapy:  no.               ROS        Physical Exam  Exam conducted with a chaperone present. Constitutional:       Appearance: She is well-developed. She is not diaphoretic. HENT:      Head: Normocephalic and atraumatic.       Right Ear: External ear normal.      Left Ear: External ear normal.   Eyes:      General: No scleral icterus. Right eye: No discharge. Left eye: No discharge. Pupils: Pupils are equal, round, and reactive to light. Neck:      Thyroid: No thyromegaly. Vascular: No JVD. Trachea: No tracheal deviation. Cardiovascular:      Rate and Rhythm: Normal rate and regular rhythm. Heart sounds: Normal heart sounds. Pulmonary:      Effort: Pulmonary effort is normal. No tachypnea, accessory muscle usage or respiratory distress. Breath sounds: Normal breath sounds. No stridor. Chest:   Breasts:     Breasts are symmetrical.      Right: No inverted nipple, mass, nipple discharge, skin change or tenderness. Left: No inverted nipple, mass, nipple discharge, skin change or tenderness. Abdominal:      General: There is no distension. Palpations: Abdomen is soft. There is no mass. Tenderness: There is no abdominal tenderness. Musculoskeletal:         General: Normal range of motion. Cervical back: Normal range of motion and neck supple. Lymphadenopathy:      Cervical: No cervical adenopathy. Skin:     General: Skin is warm and dry. Neurological:      Mental Status: She is alert and oriented to person, place, and time. Psychiatric:         Speech: Speech normal.         Behavior: Behavior normal.         Thought Content: Thought content normal.         Judgment: Judgment normal.           ASSESSMENT and PLAN    ICD-10-CM ICD-9-CM    1. Sebaceous cyst of skin of right breast  N60.81 610.8         New patient presents for evaluation of RIGHT breast and is doing well overall. Upon physical examination noted non-inflamed sebaceous cyst of the lower outer quadrant of RIGHT breast. Will schedule excision. Patient will most likely require sedation for surgery. Will follow up post-op. This plan was reviewed with the patient and patient agrees. All questions were answered. Total time spent was 30 minutes.            Written by Roselyn Diego Janel Anderson, as dictated by Dr. Richard Carballo MD.

## 2023-01-20 DIAGNOSIS — N60.01 BENIGN BREAST CYST IN FEMALE, RIGHT: Primary | ICD-10-CM

## 2023-01-24 ENCOUNTER — HOSPITAL ENCOUNTER (OUTPATIENT)
Dept: PREADMISSION TESTING | Age: 48
Discharge: HOME | End: 2023-01-24

## 2023-01-24 VITALS
HEIGHT: 67 IN | WEIGHT: 205 LBS | TEMPERATURE: 98.1 F | SYSTOLIC BLOOD PRESSURE: 102 MMHG | DIASTOLIC BLOOD PRESSURE: 70 MMHG | BODY MASS INDEX: 32.18 KG/M2 | HEART RATE: 79 BPM

## 2023-01-24 RX ORDER — IBUPROFEN 200 MG
800 TABLET ORAL
COMMUNITY

## 2023-01-24 RX ORDER — DEXTROMETHORPHAN HYDROBROMIDE, GUAIFENESIN 5; 100 MG/5ML; MG/5ML
650 LIQUID ORAL EVERY 8 HOURS
COMMUNITY

## 2023-01-24 NOTE — PERIOP NOTES
PATIENT GIVEN SURGICAL SITE INFECTION FAQ HANDOUT AND HAND WASHING TIP SHEET. PREOP INSTRUCTIONS REVIEWED AND PATIENT VERBALIZES UNDERSTANDING OF INSTRUCTIONS. PATIENT HAS BEEN GIVEN THE OPPORTUNITY TO ASK ADDITIONAL QUESTIONS. MADE COPY OF COVID 19 VACCINE CARD.

## 2023-01-24 NOTE — PERIOP NOTES
[address] INSTRUCTIONS    Surgery Date:   1/26/23    Your surgeon's office or [facility] staff will call you between 4 PM- 8 PM the day before surgery with your arrival time. If your surgery is on a Monday, you will receive a call the preceding Friday. Please report to [facility] Patient Access/Admitting on the 1st floor. [address] [address] , GO IN  PARKING ENTRANCE AND TURN LEFT IMMEDIATELY, CHECK IN AT PATIENT REGISTRATION . Bring your insurance card, photo identification, and any copayment ( if applicable). If you are going home the same day of your surgery, you must have a responsible adult to drive you home. You need to have a responsible adult to stay with you the first 24 hours after surgery and you should not drive a car for 24 hours following your surgery. Do NOT eat any solid foods after midnight the night before surgery including candy, mint or gum. You may drink clear liquids from midnight until 1 hour prior to your arrival. You may drink up to 12 ounces at one time every 4 hours. Please note special instructions, if applicable, below for medications. Do NOT drink alcohol or smoke 24 hours before surgery. STOP smoking for 14 days prior as it helps with breathing and healing after surgery. If you are being admitted to the hospital, please leave personal belongings/luggage in your car until you have an assigned hospital room number. Please wear comfortable clothes. Wear your glasses instead of contacts. We ask that all money, jewelry and valuables be left at home. Wear no make up, particularly mascara, the day of surgery. All body piercings, rings, and jewelry need to be removed and left at home. Please remove any nail polish or artifical nails from your fingernails. Please wear your hair loose or down. Please no pony-tails, buns, or any metal hair accessories.  If you shower the morning of surgery, please do not apply any lotions or powders afterwards. You may wear deodorant, unless having breast surgery. Do not shave any body area within 24 hours of your surgery. Please follow all instructions to avoid any potential surgical cancellation. Should your physical condition change, (i.e. fever, cold, flu, etc.) please notify your surgeon as soon as possible. It is important to be on time. If a situation occurs where you may be delayed, please call:  [phone] / [phone] on the day of surgery. The Preadmission Testing staff can be reached at [phone]. Special instructions: ANY INSTRUCTIONS FROM DR. ZHU OFFICE , [phone]       Current Outpatient Medications   Medication Sig    linaCLOtide (LINZESS) 290 mcg cap capsule Take 290 mcg by mouth nightly. OTHER IB GUARD ULTRAPURIFIED PEPPERMINT OIL , TAKES AS NEEDED BEFORE MEALS, OR FD GUARD KATHLEEN OIL WITH 1 MENTOL , TAKES BEFORE MEALS WHEN NEEDED    ibuprofen (MOTRIN) 200 mg tablet Take 800 mg by mouth every six (6) hours as needed for Pain. OTHER QUALITY PLUS BACK AND BODY WITH 500MG ASPIRIN AND 32.5 MG CAFFEINE IN IT  Indications: PAIN    acetaminophen (Tylenol Arthritis Pain) 650 mg TbER Take 650 mg by mouth every eight (8) hours. Victoza 3-Sher 0.6 mg/0.1 mL (18 mg/3 mL) pnij 1.8 mg by SubCUTAneous route daily. FLUoxetine (PROzac) 40 mg capsule Take 40 mg by mouth nightly. lubiPROStone (AMITIZA) 8 mcg capsule Take 8 mcg by mouth as needed. cyclobenzaprine (FLEXERIL) 10 mg tablet Take 1 Tablet by mouth three (3) times daily as needed for Muscle Spasm(s). atenoloL (TENORMIN) 25 mg tablet TAKE 1 TABLET BY MOUTH ONCE A DAY AS DIRECTED (Patient taking differently: Take 25 mg by mouth every evening. TAKE 1 TABLET BY MOUTH ONCE A DAY AS DIRECTED)    loratadine (CLARITIN) 10 mg tablet Take 1 Tab by mouth daily.  (Patient taking differently: Take 10 mg by mouth nightly.)    topiramate (TOPAMAX) 25 mg tablet TAKE 1 TABLET BY MOUTH TWICE A DAY WITH MEALS (Patient taking differently: Take 50 mg by mouth nightly.)    traZODone (DESYREL) 100 mg tablet TAKE 1 TABLET BY MOUTH EVERY DAY AT BEDTIME (Patient taking differently: 150 mg. TAKE 1 TABLET BY MOUTH EVERY DAY AT BEDTIME)    pravastatin (PRAVACHOL) 40 mg tablet TAKE 1 TABLET BY MOUTH EVERY EVENING (Patient taking differently: 80 mg.)    potassium chloride (KLOR-CON M20) 20 mEq tablet TAKE 1 TABLET BY MOUTH EVERY DAY (Patient taking differently: Take 20 mEq by mouth nightly.)    NYSTOP powder Apply  to affected area two (2) times a day. Insulin Needles, Disposable, 32 gauge x 5/32\" ndle     montelukast (SINGULAIR) 10 mg tablet take 1 tablet by mouth once daily (Patient taking differently: Take 10 mg by mouth nightly.)     No current facility-administered medications for this encounter. YOU MUST ONLY TAKE THESE MEDICATIONS THE MORNING OF SURGERY WITH A SIP OF WATER: NONE  MEDICATIONS TO TAKE THE MORNING OF SURGERY ONLY IF NEEDED: TYLENOL   HOLD these prescription medications BEFORE Surgery: SKIP [address] [place] THE MORNING OF SURGERY. STOP BACK AND BODY PILLS 7 DAYS PRIOR TO SURGERY. CHECK WITH DR. ZHU WHEN TO STOP IBUPROPHEN, STOP MINIMUM 3 DAYS PRIOR TO SURGERY PER ANESTHESIA. Ask your surgeon/prescribing physician about when/if to STOP taking these medications: ANY YOU HAVE QUESTIONS ON. Stop all vitamins, herbal medicines and Aspirin containing products 7 days prior to surgery. Stop any non-steroidal anti-inflammatory drugs (i.e. Ibuprofen, Naproxen, Advil, Aleve) 3 days before surgery. You may take Tylenol. If you are currently taking Plavix, Coumadin,or any other blood-thinning/anticoagulant medication contact your prescribing physician for instructions. Eating and Drinking Before Surgery    You may eat a regular dinner at the usual time on the day before your surgery. Do NOT eat any solid foods after midnight unless your arrival time at the hospital is 3pm or later.   You may drink clear liquids only from 12 midnight until 1 hours prior to your arrival time at the hospital on the day of your surgery. Do NOT drink alcohol. Clear liquids include:  Water  Fruit juices without pulp( i.e. apple juice)  Carbonated beverages  Black coffee (no cream/milk)  Tea (no cream/milk)  Gatorade  You may drink up to 12-16 ounces at one time every 4 hours between the hours of midnight and 1 hour before your arrival time at the hospital. Example- if your arrival time at the hospital is 6am, you may drink 12-16 ounces of clear liquids no later than 5am.  If your arrival time at the hospital is 3pm or later, you may eat a light breakfast before 8am.  A light breakfast includes: Toast or bagel (no butter)  Black coffee (no cream/milk)  Tea (no cream/milk)  Fruit juices without pulp ( i.e. apple juice)  Do NOT eat meat, eggs, vegetables or fruit  If you have any questions, please contact your surgeon's office. Preventing Infections Before and After - Your Surgery    IMPORTANT INSTRUCTIONS    You play an important role in your health and preparation for surgery. To reduce the germs on your skin you will need to shower with CHG soap (Chorhexidine gluconate 4%) two times before surgery. CHG soap (Hibiclens, Hex-A-Clens or store brand)  CHG soap will be provided at your Preadmission Testing (PAT) appointment. If you do not have a PAT appointment before surgery, you may arrange to  CHG soap from our office or purchase CHG soap at a pharmacy, grocery or department store. You need to purchase TWO 4 ounce bottles to use for your 2 showers. Steps to follow:  Ramone Pinna your hair with your normal shampoo and your body with regular soap and rinse well to remove shampoo and soap from your skin. Wet a clean washcloth and turn off the shower. Put CHG soap on washcloth and apply to your entire body from the neck down. Do not use on your head, face or private parts(genitals).  Do not use CHG soap on open sores, wounds or areas of skin irritation. Wash you body gently for 5 minutes. Do not wash your skin too hard. This soap does not create lather. Pay special attention to your underarms and from your belly button to your feet. Turn the shower back on and rinse well to get CHG soap off your body. Pat your skin dry with a clean, dry towel. Do not apply lotions or moisturizer. Put on clean clothes and sleep on fresh bed sheets and do not allow pets to sleep with you. Shower with CHG soap 2 times before your surgery  The evening before your surgery  The morning of your surgery      Tips to help prevent infections after your surgery:  Protect your surgical wound from germs:  Hand washing is the most important thing you and your caregivers can do to prevent infections. Keep your bandage clean and dry! Do not touch your surgical wound. Use clean, freshly washed towels and washcloths every time you shower; do not share bath linens with others. Until your surgical wound is healed, wear clothing and sleep on bed linens each day that are clean and freshly washed. Do not allow pets to sleep in your bed with you or touch your surgical wound. Do not smoke - smoking delays wound healing. This may be a good time to stop smoking. If you have diabetes, it is important for you to manage your blood sugar levels properly before your surgery as well as after your surgery. Poorly managed blood sugar levels slow down wound healing and prevent you from healing completely. Patient Information Regarding COVID Restrictions      Day of Procedure    Please park in the parking deck or any designated visitor parking lot. Enter the facility through the Main Entrance of the hospital.  On the day of surgery, please provide the cell phone number of the person who will be waiting for you to the Patient Access representative at the time of registration. Masks are highly recommended in the hospital, but not required.   Once your procedure and the immediate recovery period is completed, a nurse in the recovery area will contact your designated visitor to inform them of your room number or to otherwise review other pertinent information regarding your care. Social distancing practices are strongly encouraged in waiting areas and the cafeteria. The patient was contacted  in person. She verbalized understanding of all instructions does not  need reinforcement.

## 2023-01-25 ENCOUNTER — ANESTHESIA EVENT (OUTPATIENT)
Dept: MEDSURG UNIT | Age: 48
End: 2023-01-25
Payer: MEDICARE

## 2023-01-25 LAB
BACTERIA SPEC CULT: NORMAL
BACTERIA SPEC CULT: NORMAL
SERVICE CMNT-IMP: NORMAL

## 2023-01-25 RX ORDER — SODIUM CHLORIDE 9 MG/ML
25 INJECTION, SOLUTION INTRAVENOUS CONTINUOUS
Status: CANCELLED | OUTPATIENT
Start: 2023-01-25

## 2023-01-25 RX ORDER — DIPHENHYDRAMINE HYDROCHLORIDE 50 MG/ML
12.5 INJECTION, SOLUTION INTRAMUSCULAR; INTRAVENOUS AS NEEDED
Status: CANCELLED | OUTPATIENT
Start: 2023-01-25 | End: 2023-01-25

## 2023-01-25 RX ORDER — HYDROMORPHONE HYDROCHLORIDE 1 MG/ML
0.2 INJECTION, SOLUTION INTRAMUSCULAR; INTRAVENOUS; SUBCUTANEOUS
Status: CANCELLED | OUTPATIENT
Start: 2023-01-25

## 2023-01-25 RX ORDER — FENTANYL CITRATE 50 UG/ML
25 INJECTION, SOLUTION INTRAMUSCULAR; INTRAVENOUS
Status: CANCELLED | OUTPATIENT
Start: 2023-01-25

## 2023-01-25 RX ORDER — MIDAZOLAM HYDROCHLORIDE 1 MG/ML
0.5 INJECTION, SOLUTION INTRAMUSCULAR; INTRAVENOUS
Status: CANCELLED | OUTPATIENT
Start: 2023-01-25

## 2023-01-25 RX ORDER — HYDROCODONE BITARTRATE AND ACETAMINOPHEN 5; 325 MG/1; MG/1
1 TABLET ORAL AS NEEDED
Status: CANCELLED | OUTPATIENT
Start: 2023-01-25

## 2023-01-25 RX ORDER — ONDANSETRON 2 MG/ML
4 INJECTION INTRAMUSCULAR; INTRAVENOUS AS NEEDED
Status: CANCELLED | OUTPATIENT
Start: 2023-01-25

## 2023-01-25 RX ORDER — ALBUTEROL SULFATE 0.83 MG/ML
2.5 SOLUTION RESPIRATORY (INHALATION) AS NEEDED
Status: CANCELLED | OUTPATIENT
Start: 2023-01-25

## 2023-01-25 RX ORDER — SODIUM CHLORIDE, SODIUM LACTATE, POTASSIUM CHLORIDE, CALCIUM CHLORIDE 600; 310; 30; 20 MG/100ML; MG/100ML; MG/100ML; MG/100ML
1000 INJECTION, SOLUTION INTRAVENOUS CONTINUOUS
Status: CANCELLED | OUTPATIENT
Start: 2023-01-25

## 2023-01-26 ENCOUNTER — HOSPITAL ENCOUNTER (OUTPATIENT)
Age: 48
Setting detail: OUTPATIENT SURGERY
Discharge: HOME | End: 2023-01-26
Attending: SURGERY | Admitting: SURGERY
Payer: MEDICARE

## 2023-01-26 ENCOUNTER — ANESTHESIA (OUTPATIENT)
Dept: MEDSURG UNIT | Age: 48
End: 2023-01-26
Payer: MEDICARE

## 2023-01-26 VITALS
RESPIRATION RATE: 16 BRPM | OXYGEN SATURATION: 97 % | SYSTOLIC BLOOD PRESSURE: 119 MMHG | WEIGHT: 205 LBS | BODY MASS INDEX: 32.95 KG/M2 | TEMPERATURE: 97.5 F | DIASTOLIC BLOOD PRESSURE: 89 MMHG | HEIGHT: 66 IN | HEART RATE: 89 BPM

## 2023-01-26 DIAGNOSIS — N60.81 SEBACEOUS CYST OF SKIN OF RIGHT BREAST: Primary | ICD-10-CM

## 2023-01-26 DIAGNOSIS — N60.01 BENIGN BREAST CYST IN FEMALE, RIGHT: ICD-10-CM

## 2023-01-26 PROCEDURE — 77030040361 HC SLV COMPR DVT MDII -B: Performed by: SURGERY

## 2023-01-26 PROCEDURE — 77030002933 HC SUT MCRYL J&J -A: Performed by: SURGERY

## 2023-01-26 PROCEDURE — 11404 EXC TR-EXT B9+MARG 3.1-4 CM: CPT | Performed by: SURGERY

## 2023-01-26 PROCEDURE — 2709999900 HC NON-CHARGEABLE SUPPLY: Performed by: SURGERY

## 2023-01-26 PROCEDURE — 77030011267 HC ELECTRD BLD COVD -A: Performed by: SURGERY

## 2023-01-26 PROCEDURE — 76210000035 HC AMBSU PH I REC 1 TO 1.5 HR: Performed by: SURGERY

## 2023-01-26 PROCEDURE — 12032 INTMD RPR S/A/T/EXT 2.6-7.5: CPT | Performed by: SURGERY

## 2023-01-26 PROCEDURE — 77030010507 HC ADH SKN DERMBND J&J -B: Performed by: SURGERY

## 2023-01-26 PROCEDURE — 74011000250 HC RX REV CODE- 250: Performed by: NURSE ANESTHETIST, CERTIFIED REGISTERED

## 2023-01-26 PROCEDURE — 88304 TISSUE EXAM BY PATHOLOGIST: CPT

## 2023-01-26 PROCEDURE — 74011250636 HC RX REV CODE- 250/636: Performed by: NURSE ANESTHETIST, CERTIFIED REGISTERED

## 2023-01-26 PROCEDURE — 74011000250 HC RX REV CODE- 250: Performed by: SURGERY

## 2023-01-26 PROCEDURE — 76030000000 HC AMB SURG OR TIME 0.5 TO 1: Performed by: SURGERY

## 2023-01-26 PROCEDURE — 77030031139 HC SUT VCRL2 J&J -A: Performed by: SURGERY

## 2023-01-26 PROCEDURE — 76060000061 HC AMB SURG ANES 0.5 TO 1 HR: Performed by: SURGERY

## 2023-01-26 RX ORDER — SODIUM CHLORIDE, SODIUM LACTATE, POTASSIUM CHLORIDE, CALCIUM CHLORIDE 600; 310; 30; 20 MG/100ML; MG/100ML; MG/100ML; MG/100ML
INJECTION, SOLUTION INTRAVENOUS
Status: DISCONTINUED | OUTPATIENT
Start: 2023-01-26 | End: 2023-01-26 | Stop reason: HOSPADM

## 2023-01-26 RX ORDER — SODIUM CHLORIDE 9 MG/ML
25 INJECTION, SOLUTION INTRAVENOUS CONTINUOUS
Status: DISCONTINUED | OUTPATIENT
Start: 2023-01-26 | End: 2023-01-26 | Stop reason: HOSPADM

## 2023-01-26 RX ORDER — LIDOCAINE HYDROCHLORIDE 20 MG/ML
INJECTION, SOLUTION EPIDURAL; INFILTRATION; INTRACAUDAL; PERINEURAL AS NEEDED
Status: DISCONTINUED | OUTPATIENT
Start: 2023-01-26 | End: 2023-01-26 | Stop reason: HOSPADM

## 2023-01-26 RX ORDER — MIDAZOLAM HYDROCHLORIDE 1 MG/ML
INJECTION, SOLUTION INTRAMUSCULAR; INTRAVENOUS AS NEEDED
Status: DISCONTINUED | OUTPATIENT
Start: 2023-01-26 | End: 2023-01-26 | Stop reason: HOSPADM

## 2023-01-26 RX ORDER — LIDOCAINE HYDROCHLORIDE 10 MG/ML
0.1 INJECTION, SOLUTION EPIDURAL; INFILTRATION; INTRACAUDAL; PERINEURAL AS NEEDED
Status: DISCONTINUED | OUTPATIENT
Start: 2023-01-26 | End: 2023-01-26 | Stop reason: HOSPADM

## 2023-01-26 RX ORDER — FENTANYL CITRATE 50 UG/ML
50 INJECTION, SOLUTION INTRAMUSCULAR; INTRAVENOUS AS NEEDED
Status: DISCONTINUED | OUTPATIENT
Start: 2023-01-26 | End: 2023-01-26 | Stop reason: HOSPADM

## 2023-01-26 RX ORDER — OXYCODONE AND ACETAMINOPHEN 5; 325 MG/1; MG/1
1 TABLET ORAL
Qty: 10 TABLET | Refills: 0 | Status: SHIPPED | OUTPATIENT
Start: 2023-01-26 | End: 2023-01-29

## 2023-01-26 RX ORDER — ACETAMINOPHEN 325 MG/1
650 TABLET ORAL ONCE
Status: DISCONTINUED | OUTPATIENT
Start: 2023-01-26 | End: 2023-01-26 | Stop reason: HOSPADM

## 2023-01-26 RX ORDER — LIDOCAINE HYDROCHLORIDE AND EPINEPHRINE 10; 10 MG/ML; UG/ML
30 INJECTION, SOLUTION INFILTRATION; PERINEURAL ONCE
Status: CANCELLED | OUTPATIENT
Start: 2023-01-26 | End: 2023-01-26

## 2023-01-26 RX ORDER — CLINDAMYCIN PHOSPHATE 300 MG/50ML
300 INJECTION, SOLUTION INTRAVENOUS
Status: CANCELLED | OUTPATIENT
Start: 2023-01-26 | End: 2023-01-26

## 2023-01-26 RX ORDER — MIDAZOLAM HYDROCHLORIDE 1 MG/ML
1 INJECTION, SOLUTION INTRAMUSCULAR; INTRAVENOUS AS NEEDED
Status: DISCONTINUED | OUTPATIENT
Start: 2023-01-26 | End: 2023-01-26 | Stop reason: HOSPADM

## 2023-01-26 RX ORDER — GLYCOPYRROLATE 0.2 MG/ML
0.2 INJECTION INTRAMUSCULAR; INTRAVENOUS
Status: DISCONTINUED | OUTPATIENT
Start: 2023-01-26 | End: 2023-01-26 | Stop reason: HOSPADM

## 2023-01-26 RX ORDER — PROPOFOL 10 MG/ML
INJECTION, EMULSION INTRAVENOUS
Status: DISCONTINUED | OUTPATIENT
Start: 2023-01-26 | End: 2023-01-26 | Stop reason: HOSPADM

## 2023-01-26 RX ORDER — DEXMEDETOMIDINE HYDROCHLORIDE 100 UG/ML
INJECTION, SOLUTION INTRAVENOUS AS NEEDED
Status: DISCONTINUED | OUTPATIENT
Start: 2023-01-26 | End: 2023-01-26 | Stop reason: HOSPADM

## 2023-01-26 RX ORDER — ROPIVACAINE HYDROCHLORIDE 5 MG/ML
30 INJECTION, SOLUTION EPIDURAL; INFILTRATION; PERINEURAL AS NEEDED
Status: DISCONTINUED | OUTPATIENT
Start: 2023-01-26 | End: 2023-01-26 | Stop reason: HOSPADM

## 2023-01-26 RX ORDER — PROPOFOL 10 MG/ML
INJECTION, EMULSION INTRAVENOUS AS NEEDED
Status: DISCONTINUED | OUTPATIENT
Start: 2023-01-26 | End: 2023-01-26 | Stop reason: HOSPADM

## 2023-01-26 RX ORDER — SODIUM CHLORIDE, SODIUM LACTATE, POTASSIUM CHLORIDE, CALCIUM CHLORIDE 600; 310; 30; 20 MG/100ML; MG/100ML; MG/100ML; MG/100ML
1000 INJECTION, SOLUTION INTRAVENOUS CONTINUOUS
Status: DISCONTINUED | OUTPATIENT
Start: 2023-01-26 | End: 2023-01-26 | Stop reason: HOSPADM

## 2023-01-26 RX ORDER — FENTANYL CITRATE 50 UG/ML
INJECTION, SOLUTION INTRAMUSCULAR; INTRAVENOUS AS NEEDED
Status: DISCONTINUED | OUTPATIENT
Start: 2023-01-26 | End: 2023-01-26 | Stop reason: HOSPADM

## 2023-01-26 RX ORDER — BUPIVACAINE HYDROCHLORIDE AND EPINEPHRINE 5; 5 MG/ML; UG/ML
30 INJECTION, SOLUTION EPIDURAL; INTRACAUDAL; PERINEURAL ONCE
Status: CANCELLED | OUTPATIENT
Start: 2023-01-26 | End: 2023-01-26

## 2023-01-26 RX ADMIN — MIDAZOLAM HYDROCHLORIDE 1 MG: 1 INJECTION, SOLUTION INTRAMUSCULAR; INTRAVENOUS at 12:24

## 2023-01-26 RX ADMIN — DEXMEDETOMIDINE HYDROCHLORIDE 10 MCG: 100 INJECTION, SOLUTION, CONCENTRATE INTRAVENOUS at 12:32

## 2023-01-26 RX ADMIN — PROPOFOL 50 MCG/KG/MIN: 10 INJECTION, EMULSION INTRAVENOUS at 12:24

## 2023-01-26 RX ADMIN — LIDOCAINE HYDROCHLORIDE 100 MG: 20 INJECTION, SOLUTION EPIDURAL; INFILTRATION; INTRACAUDAL; PERINEURAL at 12:24

## 2023-01-26 RX ADMIN — PROPOFOL 50 MG: 10 INJECTION, EMULSION INTRAVENOUS at 12:24

## 2023-01-26 RX ADMIN — MIDAZOLAM HYDROCHLORIDE 4 MG: 1 INJECTION, SOLUTION INTRAMUSCULAR; INTRAVENOUS at 12:20

## 2023-01-26 RX ADMIN — SODIUM CHLORIDE, POTASSIUM CHLORIDE, SODIUM LACTATE AND CALCIUM CHLORIDE: 600; 310; 30; 20 INJECTION, SOLUTION INTRAVENOUS at 12:15

## 2023-01-26 RX ADMIN — FENTANYL CITRATE 50 MCG: 50 INJECTION, SOLUTION INTRAMUSCULAR; INTRAVENOUS at 12:24

## 2023-01-26 NOTE — ANESTHESIA PREPROCEDURE EVALUATION
Anesthetic History   No history of anesthetic complications            Review of Systems / Medical History  Patient summary reviewed, nursing notes reviewed and pertinent labs reviewed    Pulmonary        Sleep apnea    Asthma        Neuro/Psych         Headaches     Cardiovascular    Hypertension          Hyperlipidemia    Exercise tolerance: >4 METS     GI/Hepatic/Renal     GERD           Endo/Other    Diabetes: type 2    Arthritis     Other Findings              Physical Exam    Airway  Mallampati: III  TM Distance: > 6 cm  Neck ROM: normal range of motion   Mouth opening: Normal     Cardiovascular  Regular rate and rhythm,  S1 and S2 normal,  no murmur, click, rub, or gallop             Dental  No notable dental hx       Pulmonary  Breath sounds clear to auscultation               Abdominal  GI exam deferred       Other Findings            Anesthetic Plan    ASA: 3  Anesthesia type: MAC            Anesthetic plan and risks discussed with: Patient

## 2023-01-26 NOTE — DISCHARGE INSTRUCTIONS
Discharge Instructions from Dr. Nenita Good    I will call you with the pathology results, typically within 1 week from today. You may shower, but no hot tubs, swimming pools, or baths until your incision is healed. No heavy lifting with the affected extremity (nothing greater than 5 pounds), and limit its use for the next 4-5 days. You may use an ice pack for comfort for the next couple of days, but do not place ice directly on the skin. Rather, use a towel or clothing to serve as a barrier between skin and ice to prevent injury. If I placed a drain, follow the drain instructions provided, especially as you keep a record of the drain output. Follow medication instructions carefully. Watch for signs of infection as listed below. Redness  Swelling  Drainage from the incision or from your nipple that appears infected  Fever over 101 degrees for consecutive readings, or over 99.5 if you are currently undergoing chemotherapy. Call our office (number is below) for a follow-up appointment. If you have any problems, our phone number is [phone].

## 2023-01-26 NOTE — BRIEF OP NOTE
Brief Postoperative Note    Patient: Ozzie Fontan  YOB: 1975  MRN: [id number]    Date of Procedure: 1/26/2023     Pre-Op Diagnosis: RIGHT BREAST SEBACEOUS CYST    Post-Op Diagnosis: Same as preoperative diagnosis.       Procedure(s):  EXCISION RIGHT SEBACEOUS CYST    Surgeon(s):  Hammad Hu MD    Surgical Assistant: Surg Asst-1: Gerard Peres    Anesthesia: MAC     Estimated Blood Loss (mL): Minimal    Complications: None    Specimens:   ID Type Source Tests Collected by Time Destination   1 : RIGHT BREAST SEBACEOUS CYST Fresh Breast  Hammad Hu MD 1/26/2023 1243 Pathology        Implants: * No implants in log *    Drains: * No LDAs found *    Findings: sebaceous cyst 3 cm right breast      Electronically Signed by Christi Olguin MD on 7/98/5284 at 1:00 PM

## 2023-01-26 NOTE — ANESTHESIA POSTPROCEDURE EVALUATION
Post-Anesthesia Evaluation and Assessment    Patient: Ozzie Bateman MRN: [id number]  SSN: [id number]    YOB: 1975  Age: 41 y.o. Sex: female      I have evaluated the patient and they are stable and ready for discharge from the PACU. Cardiovascular Function/Vital Signs  Visit Vitals  /76   Pulse 84   Temp 36.4 °C (97.5 °F)   Resp 12   Ht 5' 6\" (1.676 m)   Wt 93 kg (205 lb)   SpO2 96%   BMI 33.09 kg/m²       Patient is status post MAC anesthesia for Procedure(s):  EXCISION RIGHT SEBACEOUS CYST. Nausea/Vomiting: None    Postoperative hydration reviewed and adequate. Pain:  Pain Scale 1: FLACC (01/26/23 1318)  Pain Intensity 1: 0 (01/26/23 1318)   Managed    Neurological Status:   Neuro (WDL): Exceptions to WDL (01/26/23 1306)  Neuro  Neurologic State: Drowsy (01/26/23 1318)   At baseline    Mental Status, Level of Consciousness: Alert and  oriented to person, place, and time    Pulmonary Status:   O2 Device: Nasal cannula (01/26/23 1308)   Adequate oxygenation and airway patent    Complications related to anesthesia: None    Post-anesthesia assessment completed. No concerns    Signed By: Yesenia Oropeza MD     January 26, 2023              Procedure(s):  EXCISION RIGHT SEBACEOUS CYST. MAC    <BSHSIANPOST>    INITIAL Post-op Vital signs:   Vitals Value Taken Time   /71 01/26/23 1330   Temp     Pulse 70 01/26/23 1336   Resp 0 01/26/23 1336   SpO2 100 % 01/26/23 1336   Vitals shown include unvalidated device data.

## 2023-01-26 NOTE — PERIOP NOTES
Bloody oozing noted from incision on right breast.  Dr. Shane Covarrubias notified and ordered gauze held to incision to stop bleeding. Gauze applied as ordered. Pt discharged home with gauze on the incision and held in place by her bra. Extra gauze given to patient with instructions to call Dr. Shane Covarrubias if bleeding increases or signs of infection.

## 2023-01-26 NOTE — H&P
HISTORY OF PRESENT ILLNESS  Kern Fleischer is a 41 y.o. female. NEW patient consult referred by  Dr. Adiel Sebastian for RIGHT breast cyst. Has noticed the area for about two weeks and has been growing in size. Having pain with the area as well. Has not been taking bactrim. 12/23/22- Was given bactrim for 10 days by GYN and no growth on culture after I&D     Family History:  Paternal grandmother- Breast cancer  Maternal Aunt- Ovarian cancer passed   Maternal Aunt- Colon cancer      Breast imaging-   [facility] Results (most recent):  Results from Hospital Encounter encounter on 04/06/21     [facility] 3D MARIFER W MAMMO BI SCREENING INCL CAD     Narrative  STUDY: Bilateral digital screening mammogram with 3-D tomosynthesis     INDICATION:  Screening. COMPARISON: 2019, 2016     BREAST COMPOSITION: There are scattered areas of fibroglandular density. FINDINGS: Bilateral digital screening mammography was performed and is  interpreted in conjunction with a computer assisted detection (CAD) system. Additionally, tomosynthesis of both breasts in the CC and MLO projections was  performed. No suspicious masses or calcifications are identified. There has been  no significant change. Impression  BI-RADS 1: Negative. No mammographic evidence of malignancy. RECOMMENDATIONS:  Next screening mammogram is recommended in one year. The patient will be notified of these results.                    Past Medical History:   Diagnosis Date    Arthritis       lower back    Asthma       last attack 1994    Bipolar 1 disorder ([address]      Chronic neck pain      DDD (degenerative disc disease), cervical       followed by ortho    Diabetes mellitus type 2, controlled ([address]      Fibromyalgia      GERD (gastroesophageal reflux disease)      High cholesterol      HTN, goal below 140/90      Migraines      Morbid obesity (HCC)      SONY (obstructive sleep apnea)       DOESN'T USE CPAP               Past Surgical History:   Procedure Laterality Date    COLONOSCOPY N/A 2019     COLONOSCOPY performed by Kalie Brunson MD at [facility] ENDOSCOPY    HX BREAST BIOPSY   2014     right excision- sebaceous cyst    HX  SECTION        HX HEENT         wisdom teeth    HX ORTHOPAEDIC   2017     CERVICAL FUSION    HX OTHER SURGICAL        spinal tap - viral menigitis    HX OTHER SURGICAL         SEBACEOUS CYST-BACK    HX TOTAL LAPAROSCOPIC HYSTERECTOMY W/ BS&O       RI BREAST SURGERY PROCEDURE UNLISTED   13      RIGHT BREAST EXCISION OF MULTIPLE SEBACEOUS CYSTS         Social History            Socioeconomic History    Marital status:        Spouse name: Not on file    Number of children: Not on file    Years of education: Not on file    Highest education level: Not on file   Occupational History    Not on file   Tobacco Use    Smoking status: Never    Smokeless tobacco: Never   Substance and Sexual Activity    Alcohol use: Yes       Comment: SOCIAL     Drug use: No    Sexual activity: Not Currently       Birth control/protection: None, Surgical   Other Topics Concern    Not on file   Social History Narrative    Not on file      Social Determinants of Health      Financial Resource Strain: Not on file   Food Insecurity: Not on file   Transportation Needs: Not on file   Physical Activity: Not on file   Stress: Not on file   Social Connections: Not on file   Intimate Partner Violence: Not on file   Housing Stability: Not on file                Current Outpatient Medications on File Prior to Visit   Medication Sig Dispense Refill    trimethoprim-sulfamethoxazole (BACTRIM DS, SEPTRA DS) 160-800 mg per tablet Take 1 Tablet by mouth two (2) times a day for 10 days. 20 Tablet 0    Victoza 3-Sher 0.6 mg/0.1 mL (18 mg/3 mL) pnij          FLUoxetine (PROzac) 40 mg capsule Take 40 mg by mouth daily.         lubiPROStone (AMITIZA) 8 mcg capsule          Insulin Needles, Disposable, 32 gauge x \" ndle          cyclobenzaprine (FLEXERIL) 10 mg tablet Take 1 Tablet by mouth three (3) times daily as needed for Muscle Spasm(s). 15 Tablet 0    atenoloL (TENORMIN) 25 mg tablet TAKE 1 TABLET BY MOUTH ONCE A DAY AS DIRECTED 15 Tab 0    loratadine (CLARITIN) 10 mg tablet Take 1 Tab by mouth daily. 20 Tab 0    topiramate (TOPAMAX) 25 mg tablet TAKE 1 TABLET BY MOUTH TWICE A DAY WITH MEALS (Patient taking differently: 50 mg. TAKE 1 TABLET BY MOUTH TWICE A DAY WITH MEALS) 180 Tab 0    traZODone (DESYREL) 100 mg tablet TAKE 1 TABLET BY MOUTH EVERY DAY AT BEDTIME (Patient taking differently: 150 mg. TAKE 1 TABLET BY MOUTH EVERY DAY AT BEDTIME) 90 Tab 3    pravastatin (PRAVACHOL) 40 mg tablet TAKE 1 TABLET BY MOUTH EVERY EVENING (Patient taking differently: 80 mg.) 90 Tab 3    potassium chloride (KLOR-CON M20) 20 mEq tablet TAKE 1 TABLET BY MOUTH EVERY DAY 90 Tab 0    NYSTOP powder Apply  to affected area two (2) times a day. 60 g 5    montelukast (SINGULAIR) 10 mg tablet take 1 tablet by mouth once daily 30 Tab 6      No current facility-administered medications on file prior to visit. Allergies   Allergen Reactions    Pcn [Penicillins] Swelling       Swelling throat    Shellfish Containing Products Swelling       Swells throat and eyes    Morphine Other (comments)       FAST HEARTBEAT AND NAUSEA PER PATIENT         OB History            2    Para        Term                AB        Living   2           SAB        IAB        Ectopic        Molar        Multiple        Live Births               Obstetric Comments   Menarche:  17. LMP: 36.  # of Children:  2. Age at Delivery of First Child:  [place].   Hysterectomy/oophorectomy:  Yes/NO. Breast Bx:  Yes left   Hx of Breast Feeding:  no. BCP:  yes. Hormone therapy:  no.                   ROS           Physical Exam  Exam conducted with a chaperone present. Constitutional:       Appearance: She is well-developed. She is not diaphoretic.    HENT:      Head: Normocephalic and atraumatic. Right Ear: External ear normal.      Left Ear: External ear normal.   Eyes:      General: No scleral icterus. Right eye: No discharge. Left eye: No discharge. Pupils: Pupils are equal, round, and reactive to light. Neck:      Thyroid: No thyromegaly. Vascular: No JVD. Trachea: No tracheal deviation. Cardiovascular:      Rate and Rhythm: Normal rate and regular rhythm. Heart sounds: Normal heart sounds. Pulmonary:      Effort: Pulmonary effort is normal. No tachypnea, accessory muscle usage or respiratory distress. Breath sounds: Normal breath sounds. No stridor. Chest:   Breasts:     Breasts are symmetrical.      Right: No inverted nipple, mass, nipple discharge, skin change or tenderness. Left: No inverted nipple, mass, nipple discharge, skin change or tenderness. Abdominal:      General: There is no distension. Palpations: Abdomen is soft. There is no mass. Tenderness: There is no abdominal tenderness. Musculoskeletal:         General: Normal range of motion. Cervical back: Normal range of motion and neck supple. Lymphadenopathy:      Cervical: No cervical adenopathy. Skin:     General: Skin is warm and dry. Neurological:      Mental Status: She is alert and oriented to person, place, and time. Psychiatric:         Speech: Speech normal.         Behavior: Behavior normal.         Thought Content: Thought content normal.         Judgment: Judgment normal.               ASSESSMENT and PLAN      ICD-10-CM ICD-9-CM     1. Sebaceous cyst of skin of right breast  N60.81 610.8            New patient presents for evaluation of RIGHT breast and is doing well overall. Upon physical examination noted non-inflamed sebaceous cyst of the lower outer quadrant of RIGHT breast. Will schedule excision. Patient will most likely require sedation for surgery. Will follow up post-op.  This plan was reviewed with the patient and patient agrees. All questions were answered.

## 2023-01-26 NOTE — PERIOP NOTES
Reviewed discharge instructions with patient's mom, Addison Cueva, via phone. Ramonita verbalized understanding. Paper copy given to patient. No further questions at this time.

## 2023-01-27 NOTE — OP NOTES
[address]   OPERATIVE REPORT    Name:  Shashank Galdamez  MR#:  [id number]  :  1975  ACCOUNT #:  [de-identified]  DATE OF SERVICE:  2023    PREOPERATIVE DIAGNOSIS:  Sebaceous cyst of the right breast.    POSTOPERATIVE DIAGNOSIS:  Sebaceous cyst of the right breast.    PROCEDURE PERFORMED:  Excision of sebaceous cyst of the right breast, approximately 3 cm. SURGEON:  Marj Zamorano MD    ASSISTANT:  Lu Stone. ANESTHESIA:  Local standby. COMPLICATIONS:  None. SPECIMENS REMOVED:  None. IMPLANTS:  None. ESTIMATED BLOOD LOSS:  Minimal.    INDICATIONS:  The patient is a 29-year-old female with a recurrently infectious sebaceus cyst, which is currently not inflamed. She is admitted for excision. PROCEDURE:  After adequate IV sedation, sterile prep and drape, local anesthesia with 1% lidocaine mixed with 0.5% Marcaine, an elliptical incision was made around the sebaceous cyst, approximately 3 cm. It was deepened through subcutaneous tissue with Bovie cautery, and the mass was completely excised. All dissection planes were hemostatic. The wound was closed with interrupted 3-0 Vicryl and a running subcuticular 4-0 Monocryl on skin. The patient tolerated the procedure well. No immediate complications. She was taken to the recovery room in stable condition.       Nathaniel Mclain MD [id number]  D:  2023 13:13  T:  2023 23:08  JOB #:  [id number]  CC:  MD Haylie Abarca MD

## 2023-01-30 ENCOUNTER — TELEPHONE (OUTPATIENT)
Dept: SURGERY | Age: 48
End: 2023-01-30

## 2023-02-09 ENCOUNTER — OFFICE VISIT (OUTPATIENT)
Dept: FAMILY MEDICINE CLINIC | Age: 48
End: 2023-02-09
Payer: MEDICARE

## 2023-02-09 VITALS
SYSTOLIC BLOOD PRESSURE: 97 MMHG | OXYGEN SATURATION: 99 % | HEART RATE: 74 BPM | WEIGHT: 207 LBS | RESPIRATION RATE: 16 BRPM | BODY MASS INDEX: 33.27 KG/M2 | DIASTOLIC BLOOD PRESSURE: 66 MMHG | TEMPERATURE: 97.6 F | HEIGHT: 66 IN

## 2023-02-09 DIAGNOSIS — E78.00 HIGH CHOLESTEROL: ICD-10-CM

## 2023-02-09 DIAGNOSIS — E11.9 WELL CONTROLLED TYPE 2 DIABETES MELLITUS (HCC): Primary | ICD-10-CM

## 2023-02-09 DIAGNOSIS — I10 HTN, GOAL BELOW 140/90: ICD-10-CM

## 2023-02-09 NOTE — PROGRESS NOTES
1. Have you been to the ER, urgent care clinic since your last visit? Hospitalized since your last visit? No    2. Have you seen or consulted any other health care providers outside of the [address] [place] [address] since your last visit? Include any pap smears or colon screening. No    Health Maintenance Due   Topic Date Due    Pneumococcal 0-64 years (1 - PCV) Never done    Eye Exam Retinal or Dilated  Never done    Hepatitis B Vaccine (1 of 3 - Risk 3-dose series) Never done    Diabetic Alb to Cr ratio (uACR) test  06/09/2018    Medicare Yearly Exam  03/26/2020    Foot Exam Q1  03/26/2020    COVID-19 Vaccine (4 - Booster for Moderna series) 03/07/2022    Flu Vaccine (1) Never done     Chief Complaint   Patient presents with    Diabetes    Follow-up    Medication Refill     Visit Vitals  BP 97/66 (BP 1 Location: Left upper arm, BP Patient Position: Sitting, BP Cuff Size: Adult)   Pulse 74   Temp 97.6 °F (36.4 °C) (Skin)   Resp 16   Ht 5' 6\" (1.676 m)   Wt 207 lb (93.9 kg)   SpO2 99%   BMI 33.41 kg/m²     3 most recent PHQ Screens 2/9/2023   Little interest or pleasure in doing things Not at all   Feeling down, depressed, irritable, or hopeless Not at all   Total Score PHQ 2 0     Abuse Screening Questionnaire 2/9/2023   Do you ever feel afraid of your partner? N   Are you in a relationship with someone who physically or mentally threatens you? N   Is it safe for you to go home?  Y     Visit Vitals  BP 97/66 (BP 1 Location: Left upper arm, BP Patient Position: Sitting, BP Cuff Size: Adult)   Pulse 74   Temp 97.6 °F (36.4 °C) (Skin)   Resp 16   Ht 5' 6\" (1.676 m)   Wt 207 lb (93.9 kg)   SpO2 99%   BMI 33.41 kg/m²

## 2023-02-09 NOTE — PROGRESS NOTES
Chief Complaint   Patient presents with    Diabetes    Follow-up    Medication Refill    Cholesterol Problem     she is a 41y.o. year old female who presents for evalution. She is taking victoza  The a1c is now 5.6( sept 2022)  Her weight was 230 last April and 220 in December abd now 207  She had oral surgery  few days ago  She walks all day at work  She has been off cholesterol meds 6 months  She had a rash on the abd under the pan. She used nystatin and it went away    Reviewed PmHx, RxHx, FmHx, SocHx, AllgHx and updated and dated in the chart.     Aspirin yes ____   No____ N/A____    Patient Active Problem List    Diagnosis    Type 2 diabetes mellitus with diabetic neuropathy ([address]    Fibromyalgia    DDD (degenerative disc disease), lumbar    Well controlled type 2 diabetes mellitus ([address]    Cervical stenosis of spine    Chronic neck pain    SONY (obstructive sleep apnea)    DDD (degenerative disc disease), cervical     followed by ortho      Arthritis     lower back      Asthma     last attack 1994      Bipolar 1 disorder (HCC)    GERD (gastroesophageal reflux disease)    High cholesterol    HTN, goal below 140/90    Migraines    Morbid obesity ([address]    Unspecified sleep apnea     no cpap-NEVER TESTED      Diabetes mellitus type 2, controlled ([address]     a1c 7.4% at outside EMR         Nurse notes were reviewed and copied and are correct  Review of Systems - negative except as listed above in the HPI    Objective:     Vitals:    02/09/23 0913   BP: 97/66   Pulse: 74   Resp: 16   Temp: 97.6 °F (36.4 °C)   TempSrc: Skin   SpO2: 99%   Weight: 207 lb (93.9 kg)   Height: 5' 6\" (1.676 m)     Physical Examination: General appearance - alert, well appearing, and in no distress  Mental status - alert, oriented to person, place, and time  Neck - supple, no significant adenopathy  Chest - clear to auscultation, no wheezes, rales or rhonchi, symmetric air entry  Heart - normal rate, regular rhythm, normal S1, S2, no murmurs, rubs, clicks or gallops  Abdomen - soft, nontender, nondistended, no masses or organomegaly  Musculoskeletal - no joint tenderness, deformity or swelling  Extremities - peripheral pulses normal, no pedal edema, no clubbing or cyanosis         Assessment/ Plan:   Diagnoses and all orders for this visit:    1. Well controlled type 2 diabetes mellitus ([facility]  -     METABOLIC PANEL, COMPREHENSIVE; Future  -      DIABETES FOOT EXAM  -     MICROALBUMIN, UR, RAND W/ MICROALB/CREAT RATIO; Future  Cont the victoza at 1.8 mg a day  2. HTN, goal below 957/54  -     METABOLIC PANEL, COMPREHENSIVE; Future  Atenolol 25 mg once a day  3. High cholesterol  -     LIPID PANEL; Future   Cont the pravachol  40 mg a day          ICD-10-CM ICD-9-CM    1. Well controlled type 2 diabetes mellitus ([facility]  J28.6 379.01 METABOLIC PANEL, COMPREHENSIVE       DIABETES FOOT EXAM      MICROALBUMIN, UR, RAND W/ MICROALB/CREAT RATIO      MICROALBUMIN, UR, RAND W/ MICROALB/CREAT RATIO      METABOLIC PANEL, COMPREHENSIVE      2. HTN, goal below 140/90  R10 174.8 METABOLIC PANEL, COMPREHENSIVE      METABOLIC PANEL, COMPREHENSIVE      3. High cholesterol  E78.00 272.0 LIPID PANEL      LIPID PANEL          I have discussed the diagnosis with the patient and the intended plan as seen in the above orders. The patient has received an after-visit summary  if not on [facility] and questions were answered concerning future plans. Patients in [facility] have access to avs without printing    Medication Side Effects and Warnings were discussed with patient:   Patient Labs were reviewed and or requested:   Patient Past Records were reviewed and or requested: yes        There are no Patient Instructions on file for this visit.

## 2023-02-10 LAB
ALBUMIN SERPL-MCNC: 3.7 G/DL (ref 3.5–5)
ALBUMIN/GLOB SERPL: 1.2 (ref 1.1–2.2)
ALP SERPL-CCNC: 55 U/L (ref 45–117)
ALT SERPL-CCNC: 31 U/L (ref 12–78)
ANION GAP SERPL CALC-SCNC: 5 MMOL/L (ref 5–15)
AST SERPL-CCNC: 23 U/L (ref 15–37)
BILIRUB SERPL-MCNC: 0.2 MG/DL (ref 0.2–1)
BUN SERPL-MCNC: 8 MG/DL (ref 6–20)
BUN/CREAT SERPL: 11 (ref 12–20)
CALCIUM SERPL-MCNC: 9 MG/DL (ref 8.5–10.1)
CHLORIDE SERPL-SCNC: 108 MMOL/L (ref 97–108)
CHOLEST SERPL-MCNC: 192 MG/DL
CO2 SERPL-SCNC: 28 MMOL/L (ref 21–32)
CREAT SERPL-MCNC: 0.76 MG/DL (ref 0.55–1.02)
CREAT UR-MCNC: 420 MG/DL
GLOBULIN SER CALC-MCNC: 3.2 G/DL (ref 2–4)
GLUCOSE SERPL-MCNC: 97 MG/DL (ref 65–100)
HDLC SERPL-MCNC: 76 MG/DL
HDLC SERPL: 2.5 (ref 0–5)
LDLC SERPL CALC-MCNC: 104 MG/DL (ref 0–100)
MICROALBUMIN UR-MCNC: 8.96 MG/DL
MICROALBUMIN/CREAT UR-RTO: 21 MG/G (ref 0–30)
POTASSIUM SERPL-SCNC: 3.7 MMOL/L (ref 3.5–5.1)
PROT SERPL-MCNC: 6.9 G/DL (ref 6.4–8.2)
SODIUM SERPL-SCNC: 141 MMOL/L (ref 136–145)
TRIGL SERPL-MCNC: 60 MG/DL (ref ?–150)
VLDLC SERPL CALC-MCNC: 12 MG/DL

## 2023-05-01 ENCOUNTER — HOSPITAL ENCOUNTER (EMERGENCY)
Age: 48
Discharge: HOME | End: 2023-05-01
Attending: UROLOGY
Payer: MEDICARE

## 2023-05-01 VITALS
OXYGEN SATURATION: 100 % | SYSTOLIC BLOOD PRESSURE: 114 MMHG | BODY MASS INDEX: 35.17 KG/M2 | TEMPERATURE: 99.1 F | WEIGHT: 218.8 LBS | RESPIRATION RATE: 16 BRPM | HEIGHT: 66 IN | HEART RATE: 91 BPM | DIASTOLIC BLOOD PRESSURE: 72 MMHG

## 2023-05-01 DIAGNOSIS — Z76.0 MEDICATION REFILL: ICD-10-CM

## 2023-05-01 DIAGNOSIS — U07.1 COVID-19: Primary | ICD-10-CM

## 2023-05-01 LAB
COMMENT, HOLDF: NORMAL
FLUAV AG NPH QL IA: NEGATIVE
FLUBV AG NOSE QL IA: NEGATIVE
SAMPLES BEING HELD,HOLD: NORMAL
SARS-COV-2 RDRP RESP QL NAA+PROBE: DETECTED
SOURCE, COVRS: ABNORMAL

## 2023-05-01 PROCEDURE — 87635 SARS-COV-2 COVID-19 AMP PRB: CPT

## 2023-05-01 PROCEDURE — 96361 HYDRATE IV INFUSION ADD-ON: CPT

## 2023-05-01 PROCEDURE — 87804 INFLUENZA ASSAY W/OPTIC: CPT

## 2023-05-01 PROCEDURE — 74011250636 HC RX REV CODE- 250/636

## 2023-05-01 PROCEDURE — 96374 THER/PROPH/DIAG INJ IV PUSH: CPT

## 2023-05-01 PROCEDURE — 74011250637 HC RX REV CODE- 250/637

## 2023-05-01 PROCEDURE — 99284 EMERGENCY DEPT VISIT MOD MDM: CPT

## 2023-05-01 PROCEDURE — 36415 COLL VENOUS BLD VENIPUNCTURE: CPT

## 2023-05-01 RX ORDER — ACETAMINOPHEN 325 MG/1
650 TABLET ORAL
Qty: 56 TABLET | Refills: 0 | Status: SHIPPED | OUTPATIENT
Start: 2023-05-01 | End: 2023-05-15

## 2023-05-01 RX ORDER — ONDANSETRON 2 MG/ML
4 INJECTION INTRAMUSCULAR; INTRAVENOUS
Status: COMPLETED | OUTPATIENT
Start: 2023-05-01 | End: 2023-05-01

## 2023-05-01 RX ORDER — ACETAMINOPHEN 500 MG
1000 TABLET ORAL
Status: COMPLETED | OUTPATIENT
Start: 2023-05-01 | End: 2023-05-01

## 2023-05-01 RX ORDER — IBUPROFEN 600 MG/1
600 TABLET ORAL
Qty: 56 TABLET | Refills: 0 | Status: SHIPPED | OUTPATIENT
Start: 2023-05-01 | End: 2023-05-15

## 2023-05-01 RX ORDER — ATENOLOL 25 MG/1
25 TABLET ORAL DAILY
Qty: 30 TABLET | Refills: 0 | Status: SHIPPED | OUTPATIENT
Start: 2023-05-01 | End: 2023-05-31

## 2023-05-01 RX ORDER — DICYCLOMINE HYDROCHLORIDE 20 MG/1
10 TABLET ORAL
Status: COMPLETED | OUTPATIENT
Start: 2023-05-01 | End: 2023-05-01

## 2023-05-01 RX ORDER — ONDANSETRON 4 MG/1
4 TABLET, ORALLY DISINTEGRATING ORAL
Qty: 9 TABLET | Refills: 0 | Status: SHIPPED | OUTPATIENT
Start: 2023-05-01 | End: 2023-05-04

## 2023-05-01 RX ADMIN — ONDANSETRON 4 MG: 2 INJECTION INTRAMUSCULAR; INTRAVENOUS at 08:46

## 2023-05-01 RX ADMIN — DICYCLOMINE HYDROCHLORIDE 10 MG: 20 TABLET ORAL at 10:33

## 2023-05-01 RX ADMIN — SODIUM CHLORIDE 1000 ML: 9 INJECTION, SOLUTION INTRAVENOUS at 08:39

## 2023-05-01 RX ADMIN — ACETAMINOPHEN 1000 MG: 500 TABLET ORAL at 08:44

## 2023-05-01 NOTE — ED TRIAGE NOTES
Pt arrives via EMS with c/o body aches, Headache , N/V, and fever since yesterday. Pt denies taking tylenol or motrin. Pt reports having contact with COVID at work.

## 2023-05-01 NOTE — ED PROVIDER NOTES
Mattie Rahman is a 41 y.o. female presenting to the ED c/o generalized body aches, weakness, headache, nausea, and vomiting X 2 since yesterday. + chills. + rhinorrhea. + cough. Pt reports taking Theraflu, cough drops, and her albuterol inhaler yesterday to help with symptoms. 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 Pt reports she is vaccinated w/ COVID X 3. Unknown fever. LBM yesterday. Denies diarrhea, dysuria, pregnancy. Pt denies taking any medications today. Medical hx: DM type 2, asthma, HTN, hysterectomy. The history is provided by the patient. No  was used. Fever   Associated symptoms include vomiting, headaches, sore throat and cough. Pertinent negatives include no chest pain, no diarrhea, no congestion, no shortness of breath and no rash. Headache   Associated symptoms include nausea and vomiting. Pertinent negatives include no fever, no shortness of breath and no weakness.       Past Medical History:   Diagnosis Date    Arthritis     lower back    Asthma     last attack 1994    Bipolar 1 disorder ([address]     Chronic neck pain     DDD (degenerative disc disease), cervical     followed by ortho    Diabetes mellitus type 2, controlled ([address]     Fibromyalgia     GERD (gastroesophageal reflux disease)     High cholesterol     HTN, goal below 140/90     Migraines     Morbid obesity (HCC)     SONY (obstructive sleep apnea)     DOESN'T USE CPAP       Past Surgical History:   Procedure Laterality Date    COLONOSCOPY N/A 01/22/2019    COLONOSCOPY performed by Gavin Godfrey MD at [facility]  06/05/2014    right excision- sebaceous cyst    HX BREAST LUMPECTOMY Right 1/26/2023    EXCISION RIGHT SEBACEOUS CYST performed by Contreras Clancy MD at [facility]      HX HEENT      wisdom teeth    HX LUMBAR FUSION      HX ORTHOPAEDIC  04/2017    CERVICAL FUSION    HX OTHER SURGICAL  1997    spinal tap - viral menigitis    HX OTHER SURGICAL      SEBACEOUS CYST-BACK    HX TOTAL LAPAROSCOPIC HYSTERECTOMY W/ BS&O  2016    MT UNLISTED PROCEDURE BREAST  02/21/2013     RIGHT BREAST EXCISION OF MULTIPLE SEBACEOUS CYSTS         Family History:   Problem Relation Age of Onset    Hypertension Mother     Diabetes Mother     Hypertension Father     Heart Disease Father         CHF    Cancer Maternal Aunt         OVARIAN;     Colon Cancer Maternal Aunt 61    Cancer Maternal Grandmother         bone cancer    Cancer Paternal Grandmother 79        breast cancer    Breast Cancer Paternal Grandmother     Anesth Problems Neg Hx        Social History     Socioeconomic History    Marital status:      Spouse name: Not on file    Number of children: Not on file    Years of education: Not on file    Highest education level: Not on file   Occupational History    Not on file   Tobacco Use    Smoking status: Never    Smokeless tobacco: Never   Substance and Sexual Activity    Alcohol use: Yes     Comment: SOCIAL     Drug use: No    Sexual activity: Not Currently     Birth control/protection: None, Surgical   Other Topics Concern    Not on file   Social History Narrative    Not on file     Social Determinants of Health     Financial Resource Strain: Not on file   Food Insecurity: Not on file   Transportation Needs: Not on file   Physical Activity: Not on file   Stress: Not on file   Social Connections: Not on file   Intimate Partner Violence: Not on file   Housing Stability: Not on file         ALLERGIES: Pcn [penicillins], Shellfish containing products, and Morphine    Review of Systems   Constitutional:  Positive for chills and fatigue. Negative for activity change, appetite change and fever. HENT:  Positive for rhinorrhea and sore throat. Negative for congestion. Eyes:  Negative for visual disturbance. Respiratory:  Positive for cough.  Negative for shortness of breath. Cardiovascular:  Negative for chest pain. Gastrointestinal:  Positive for abdominal pain, nausea and vomiting. Negative for diarrhea. Genitourinary:  Negative for dysuria. Skin:  Negative for rash. Neurological:  Positive for headaches. Negative for speech difficulty and weakness. All other systems reviewed and are negative. Vitals:    05/01/23 0827   BP: 127/80   Pulse: 91   Resp: 16   Temp: 99 °F (37.2 °C)   SpO2: 100%   Weight: 99.2 kg (218 lb 12.8 oz)   Height: 5' 6\" (1.676 m)            Physical Exam  Vitals reviewed. Constitutional:       General: She is not in acute distress. Appearance: Normal appearance. HENT:      Head: Normocephalic. Right Ear: Tympanic membrane normal.      Left Ear: Tympanic membrane normal.      Nose: Nose normal. No rhinorrhea. Mouth/Throat:      Mouth: Mucous membranes are moist.      Pharynx: No oropharyngeal exudate or posterior oropharyngeal erythema. Eyes:      Extraocular Movements: Extraocular movements intact. Pupils: Pupils are equal, round, and reactive to light. Cardiovascular:      Rate and Rhythm: Normal rate and regular rhythm. Pulses: Normal pulses. Heart sounds: Normal heart sounds. Pulmonary:      Effort: Pulmonary effort is normal. No respiratory distress. Breath sounds: Normal breath sounds. No wheezing or rhonchi. Abdominal:      General: Bowel sounds are normal.      Palpations: Abdomen is soft. There is no mass. Tenderness: There is generalized abdominal tenderness. There is no guarding. Negative signs include McBurney's sign. Musculoskeletal:         General: Normal range of motion. Skin:     General: Skin is warm and dry. Capillary Refill: Capillary refill takes less than 2 seconds. Neurological:      General: No focal deficit present. Mental Status: She is alert and oriented to person, place, and time. Mental status is at baseline.    Psychiatric: Mood and Affect: Mood normal.        Medical Decision Making  Risk  OTC drugs. Prescription drug management. Patient is a 44-year-old female presented to the emergency room complaining of generalized body aches, headache, nausea and vomiting yesterday. Doubt pneumonia given no adventitious sounds were heard in auscultation. Doubt influenza given nasal swab was negative for influenza. Pt was positive for COVID-19. Discussed the risks and benefits of taking Paxlovid. Pt states understanding and agrees to take the Paxlovid. Pt was given tylenol, bentyl, zofran, and 1L NS while in ED. Pt is stable for discharge home. Pt encouraged to continue taking ibuprofen and tylenol as needed for pain and fever. Pt encouraged to stay well hydrated and take zofran-odt as needed for nausea and vomiting. Pt educated about taking the Paxlovid as instructed and to reduce Trazodone by 50 % until 3 days after the completion of Paxlovid. Pt encouraged to follow-up with PCP in 1 week. Strict return to ED precautions given. ACI discussed with the patient; see instruction below. Patient verbalized understanding. Procedures    N/A    LABORATORY TESTS:  Recent Results (from the past 12 hour(s))   COVID-19 RAPID TEST    Collection Time: 05/01/23  8:31 AM   Result Value Ref Range    Specimen source Nasopharyngeal      COVID-19 rapid test Detected (AA) NOTD     SAMPLES BEING HELD    Collection Time: 05/01/23  8:32 AM   Result Value Ref Range    SAMPLES BEING HELD 1LAV 1RED     COMMENT        Add-on orders for these samples will be processed based on acceptable specimen integrity and analyte stability, which may vary by analyte.    INFLUENZA A+B VIRAL AGS    Collection Time: 05/01/23  8:34 AM   Result Value Ref Range    Influenza A Antigen Negative NEG      Influenza B Antigen Negative NEG         IMAGING RESULTS:  No orders to display       MEDICATIONS GIVEN:  Medications   sodium chloride 0.9 % bolus infusion 1,000 mL (0 mL IntraVENous IV Completed 5/1/23 1025)   ondansetron (ZOFRAN) injection 4 mg (4 mg IntraVENous Given 5/1/23 0846)   acetaminophen (TYLENOL) tablet 1,000 mg (1,000 mg Oral Given 5/1/23 0844)   dicyclomine (BENTYL) tablet 10 mg (10 mg Oral Given 5/1/23 1033)       IMPRESSION:  1. COVID-19    2. Medication refill        PLAN:  1. Take medications as instructed and reduce Trazodone dosage by 50 %. Discharge Medication List as of 5/1/2023 10:28 AM        START taking these medications    Details   acetaminophen (TYLENOL) 325 mg tablet Take 2 Tablets by mouth every six (6) hours as needed for Pain or Fever for up to 14 days. , Normal, Disp-56 Tablet, R-0      ondansetron (ZOFRAN ODT) 4 mg disintegrating tablet Take 1 Tablet by mouth every eight (8) hours as needed for Nausea or Vomiting for up to 3 days. , Normal, Disp-9 Tablet, R-0      nirmatrelvir-ritonavir (Paxlovid, EUA,) 300 mg (150 mg x 2)-100 mg Take 3 Tablets by mouth every twelve (12) hours for 5 days. , Normal, Disp-1 Box, R-0           CONTINUE these medications which have CHANGED    Details   ibuprofen (MOTRIN) 600 mg tablet Take 1 Tablet by mouth every six (6) hours as needed for Pain for up to 14 days. , Normal, Disp-56 Tablet, R-0      atenoloL (TENORMIN) 25 mg tablet Take 1 Tablet by mouth daily for 30 days. , Normal, Disp-30 Tablet, R-0           CONTINUE these medications which have NOT CHANGED    Details   Victoza 3-Sher 0.6 mg/0.1 mL (18 mg/3 mL) pnij 1.8 mg by SubCUTAneous route daily. , Historical Med, KELLY      linaCLOtide (LINZESS) 290 mcg cap capsule Take 290 mcg by mouth nightly., Historical Med      !! OTHER IB GUARD ULTRAPURIFIED PEPPERMINT OIL , TAKES AS NEEDED BEFORE MEALS, OR FD GUARD KATHLEEN OIL WITH 1 MENTOL , TAKES BEFORE MEALS WHEN NEEDED, Historical Med      !! OTHER QUALITY PLUS BACK AND BODY WITH 500MG ASPIRIN AND 32.5 MG CAFFEINE IN IT  Indications: PAIN, Historical Med      FLUoxetine (PROzac) 40 mg capsule Take 40 mg by mouth nightly., Historical Med      lubiPROStone (AMITIZA) 8 mcg capsule Take 8 mcg by mouth as needed., Historical Med      Insulin Needles, Disposable, 32 gauge x \" ndle Historical Med      cyclobenzaprine (FLEXERIL) 10 mg tablet Take 1 Tablet by mouth three (3) times daily as needed for Muscle Spasm(s). , Normal, Disp-15 Tablet, R-0      loratadine (CLARITIN) 10 mg tablet Take 1 Tab by mouth daily. , Normal, Disp-20 Tab, R-0      topiramate (TOPAMAX) 25 mg tablet TAKE 1 TABLET BY MOUTH TWICE A DAY WITH MEALS, Normal, Disp-180 Tab, R-0      traZODone (DESYREL) 100 mg tablet TAKE 1 TABLET BY MOUTH EVERY DAY AT BEDTIME, NormalPT IS REQUESTING REFILLSDisp-90 Tab, R-3      pravastatin (PRAVACHOL) 40 mg tablet TAKE 1 TABLET BY MOUTH EVERY EVENING, Normal, Disp-90 Tab, R-3      potassium chloride (KLOR-CON M20) 20 mEq tablet TAKE 1 TABLET BY MOUTH EVERY DAY, Normal, Disp-90 Tab, R-0      NYSTOP powder Apply  to affected area two (2) times a day., Normal, Disp-60 g, R-5, KELLY      montelukast (SINGULAIR) 10 mg tablet take 1 tablet by mouth once daily, Normal, Disp-30 Tab, R-6       !! - Potential duplicate medications found. Please discuss with provider. STOP taking these medications       acetaminophen (TYLENOL) 650 mg TbER Comments:   Reason for Stoppin. Follow-up with PCP in 1 week. Follow-up Information       Follow up With Specialties Details Why Contact Info    Damián Zepeda MD Family Medicine, Bariatrics Schedule an appointment as soon as possible for a visit in 1 week  [address] [address] [address]  [phone]            3.  Return to ED if worse     Signed By: TORI Herr     May 1, 2023

## 2023-05-01 NOTE — ED NOTES
Pt educated on the need to not take any medication prescribed to her for hyperlipidemia as it contraindicated with Paxlovid.  Pt stated that she did not Pravastin any longer

## 2023-05-01 NOTE — DISCHARGE INSTRUCTIONS
Take ibuprofen and tylenol as needed for body aches, pain, fever. Take ibuprofen with food. Stay well hydrated. Drink  plenty of fluids. Use Zofran-odt as needed for nausea and vomiting. Take the Paxlovid as instructed for the next 5 days. Reduce your trazadone dose by 50 % until at least 3 days after completion of the Paxlovid. Follow-up with your primary care provider in 1 week.

## 2023-05-11 ENCOUNTER — OFFICE VISIT (OUTPATIENT)
Dept: PRIMARY CARE CLINIC | Facility: CLINIC | Age: 48
End: 2023-05-11
Payer: MEDICARE

## 2023-05-11 VITALS
RESPIRATION RATE: 16 BRPM | HEART RATE: 80 BPM | HEIGHT: 66 IN | WEIGHT: 208.4 LBS | BODY MASS INDEX: 33.49 KG/M2 | SYSTOLIC BLOOD PRESSURE: 125 MMHG | OXYGEN SATURATION: 100 % | TEMPERATURE: 97.3 F | DIASTOLIC BLOOD PRESSURE: 73 MMHG

## 2023-05-11 DIAGNOSIS — R00.2 PALPITATIONS: ICD-10-CM

## 2023-05-11 DIAGNOSIS — I10 HTN, GOAL BELOW 140/90: ICD-10-CM

## 2023-05-11 DIAGNOSIS — F31.9 BIPOLAR 1 DISORDER (HCC): ICD-10-CM

## 2023-05-11 DIAGNOSIS — E11.40 TYPE 2 DIABETES MELLITUS WITH DIABETIC NEUROPATHY, WITHOUT LONG-TERM CURRENT USE OF INSULIN (HCC): ICD-10-CM

## 2023-05-11 DIAGNOSIS — Z12.31 SCREENING MAMMOGRAM FOR BREAST CANCER: Primary | ICD-10-CM

## 2023-05-11 PROCEDURE — G8427 DOCREV CUR MEDS BY ELIG CLIN: HCPCS | Performed by: FAMILY MEDICINE

## 2023-05-11 PROCEDURE — 4004F PT TOBACCO SCREEN RCVD TLK: CPT | Performed by: FAMILY MEDICINE

## 2023-05-11 PROCEDURE — 93000 ELECTROCARDIOGRAM COMPLETE: CPT | Performed by: FAMILY MEDICINE

## 2023-05-11 PROCEDURE — 3074F SYST BP LT 130 MM HG: CPT | Performed by: FAMILY MEDICINE

## 2023-05-11 PROCEDURE — 3078F DIAST BP <80 MM HG: CPT | Performed by: FAMILY MEDICINE

## 2023-05-11 PROCEDURE — 2022F DILAT RTA XM EVC RTNOPTHY: CPT | Performed by: FAMILY MEDICINE

## 2023-05-11 PROCEDURE — 99204 OFFICE O/P NEW MOD 45 MIN: CPT | Performed by: FAMILY MEDICINE

## 2023-05-11 PROCEDURE — 3046F HEMOGLOBIN A1C LEVEL >9.0%: CPT | Performed by: FAMILY MEDICINE

## 2023-05-11 PROCEDURE — G8417 CALC BMI ABV UP PARAM F/U: HCPCS | Performed by: FAMILY MEDICINE

## 2023-05-11 RX ORDER — FLUOXETINE HYDROCHLORIDE 40 MG/1
40 CAPSULE ORAL NIGHTLY
Qty: 30 CAPSULE | Refills: 2 | Status: SHIPPED | OUTPATIENT
Start: 2023-05-11

## 2023-05-11 RX ORDER — LIRAGLUTIDE 6 MG/ML
1.8 INJECTION SUBCUTANEOUS DAILY
Qty: 2 ADJUSTABLE DOSE PRE-FILLED PEN SYRINGE | Refills: 5 | Status: SHIPPED | OUTPATIENT
Start: 2023-05-11

## 2023-05-11 RX ORDER — LORATADINE 10 MG/1
10 TABLET ORAL DAILY
Qty: 90 TABLET | Refills: 1 | Status: SHIPPED | OUTPATIENT
Start: 2023-05-11

## 2023-05-11 RX ORDER — MONTELUKAST SODIUM 10 MG/1
10 TABLET ORAL DAILY
Qty: 90 TABLET | Refills: 1 | Status: SHIPPED | OUTPATIENT
Start: 2023-05-11

## 2023-05-11 RX ORDER — ATENOLOL 25 MG/1
25 TABLET ORAL DAILY
Qty: 90 TABLET | Refills: 1 | Status: SHIPPED | OUTPATIENT
Start: 2023-05-11

## 2023-05-11 SDOH — ECONOMIC STABILITY: FOOD INSECURITY: WITHIN THE PAST 12 MONTHS, THE FOOD YOU BOUGHT JUST DIDN'T LAST AND YOU DIDN'T HAVE MONEY TO GET MORE.: PATIENT DECLINED

## 2023-05-11 SDOH — ECONOMIC STABILITY: FOOD INSECURITY: WITHIN THE PAST 12 MONTHS, YOU WORRIED THAT YOUR FOOD WOULD RUN OUT BEFORE YOU GOT MONEY TO BUY MORE.: PATIENT DECLINED

## 2023-05-11 SDOH — ECONOMIC STABILITY: INCOME INSECURITY: HOW HARD IS IT FOR YOU TO PAY FOR THE VERY BASICS LIKE FOOD, HOUSING, MEDICAL CARE, AND HEATING?: PATIENT DECLINED

## 2023-05-11 SDOH — ECONOMIC STABILITY: HOUSING INSECURITY
IN THE LAST 12 MONTHS, WAS THERE A TIME WHEN YOU DID NOT HAVE A STEADY PLACE TO SLEEP OR SLEPT IN A SHELTER (INCLUDING NOW)?: PATIENT REFUSED

## 2023-05-11 ASSESSMENT — PATIENT HEALTH QUESTIONNAIRE - PHQ9
2. FEELING DOWN, DEPRESSED OR HOPELESS: 0
SUM OF ALL RESPONSES TO PHQ QUESTIONS 1-9: 0
SUM OF ALL RESPONSES TO PHQ9 QUESTIONS 1 & 2: 0
1. LITTLE INTEREST OR PLEASURE IN DOING THINGS: 0
SUM OF ALL RESPONSES TO PHQ QUESTIONS 1-9: 0
DEPRESSION UNABLE TO ASSESS: PT REFUSES

## 2023-05-11 NOTE — PROGRESS NOTES
1. \"Have you been to the ER, urgent care clinic since your last visit? Hospitalized since your last visit? \" No    2. \"Have you seen or consulted any other health care providers outside of the [address] [place] [address] since your last visit? \" No     3. For patients aged 39-70: Has the patient had a colonoscopy / FIT/ Cologuard? Yes - no Care Gap present      If the patient is female:    4. For patients aged 41-77: Has the patient had a mammogram within the past 2 years? No      5. For patients aged 21-65: Has the patient had a pap smear?  No
Housing in the Last Year: Patient refused         Review of Systems  General : Denies fever, chills, unintentional weight loss. Cardiac : Denies chest pain, shortness of breath, orthopnea, PND. Pulm : Denies coughing, hemoptysis, dyspnea. GI: Denies abd pain, vomiting, change in bowel movements, black/bloody stool. Neuro : Denies syncope or presyncope. Denies focal neuro symptoms. Reviewed PmHx, RxHx, FmHx, SocHx, AllgHx and updated and dated in the chart. Physical Exam:  /73 (Site: Left Upper Arm, Position: Sitting)   Pulse 80   Temp 97.3 °F (36.3 °C) (Temporal)   Resp 16   Ht 5' 6\" (1.676 m)   Wt 208 lb 6.4 oz (94.5 kg)   SpO2 100%   BMI 33.64 kg/m²   Physical Exam  Vitals reviewed. Constitutional:       Appearance: Normal appearance. HENT:      Head: Normocephalic and atraumatic. Eyes:      Conjunctiva/sclera: Conjunctivae normal.      Pupils: Pupils are equal, round, and reactive to light. Cardiovascular:      Rate and Rhythm: Normal rate and regular rhythm. Pulses: Normal pulses. Heart sounds: Normal heart sounds. Pulmonary:      Effort: Pulmonary effort is normal.      Breath sounds: Normal breath sounds. Musculoskeletal:      Right lower leg: No edema. Left lower leg: No edema. Skin:     General: Skin is warm and dry. Neurological:      General: No focal deficit present. Mental Status: She is alert and oriented to person, place, and time. Psychiatric:         Mood and Affect: Mood normal.         Behavior: Behavior normal.         Thought Content: Thought content normal.            Assessment / Plan     Milena Miller was seen today for establish care. Diagnoses and all orders for this visit:    Screening mammogram for breast cancer  -     [facility] DIGITAL SCREEN[facility]UGMENTED BILATERAL; Future    Palpitations  -     AMB POC EKG ROUTINE  -     TSH; Future  -     CBC; Future    HTN, goal below 140/90  -     Lipid Panel; Future  -     CBC;  Future  -

## 2023-05-12 LAB
ALBUMIN SERPL-MCNC: 3.9 G/DL (ref 3.5–5)
ALBUMIN/GLOB SERPL: 1 (ref 1.1–2.2)
ALP SERPL-CCNC: 64 U/L (ref 45–117)
ALT SERPL-CCNC: 32 U/L (ref 12–78)
ANION GAP SERPL CALC-SCNC: 1 MMOL/L (ref 5–15)
AST SERPL-CCNC: 13 U/L (ref 15–37)
BILIRUB SERPL-MCNC: 0.3 MG/DL (ref 0.2–1)
BUN SERPL-MCNC: 12 MG/DL (ref 6–20)
BUN/CREAT SERPL: 15 (ref 12–20)
CALCIUM SERPL-MCNC: 9.3 MG/DL (ref 8.5–10.1)
CHLORIDE SERPL-SCNC: 106 MMOL/L (ref 97–108)
CHOLEST SERPL-MCNC: 259 MG/DL
CO2 SERPL-SCNC: 28 MMOL/L (ref 21–32)
COMMENT:: NORMAL
CREAT SERPL-MCNC: 0.78 MG/DL (ref 0.55–1.02)
ERYTHROCYTE [DISTWIDTH] IN BLOOD BY AUTOMATED COUNT: 11.9 % (ref 11.5–14.5)
EST. AVERAGE GLUCOSE BLD GHB EST-MCNC: 103 MG/DL
GLOBULIN SER CALC-MCNC: 3.8 G/DL (ref 2–4)
GLUCOSE SERPL-MCNC: 84 MG/DL (ref 65–100)
HBA1C MFR BLD: 5.2 % (ref 4–5.6)
HCT VFR BLD AUTO: 40.5 % (ref 35–47)
HDLC SERPL-MCNC: 90 MG/DL
HDLC SERPL: 2.9 (ref 0–5)
HGB BLD-MCNC: 12.7 G/DL (ref 11.5–16)
LDLC SERPL CALC-MCNC: 159.4 MG/DL (ref 0–100)
MCH RBC QN AUTO: 29.3 PG (ref 26–34)
MCHC RBC AUTO-ENTMCNC: 31.4 G/DL (ref 30–36.5)
MCV RBC AUTO: 93.5 FL (ref 80–99)
NRBC # BLD: 0 K/UL (ref 0–0.01)
NRBC BLD-RTO: 0 PER 100 WBC
PLATELET # BLD AUTO: 315 K/UL (ref 150–400)
PMV BLD AUTO: 9.4 FL (ref 8.9–12.9)
POTASSIUM SERPL-SCNC: 4.1 MMOL/L (ref 3.5–5.1)
PROT SERPL-MCNC: 7.7 G/DL (ref 6.4–8.2)
RBC # BLD AUTO: 4.33 M/UL (ref 3.8–5.2)
SODIUM SERPL-SCNC: 135 MMOL/L (ref 136–145)
SPECIMEN HOLD: NORMAL
TRIGL SERPL-MCNC: 48 MG/DL
TSH SERPL DL<=0.05 MIU/L-ACNC: 1.35 UIU/ML (ref 0.36–3.74)
VLDLC SERPL CALC-MCNC: 9.6 MG/DL
WBC # BLD AUTO: 6.1 K/UL (ref 3.6–11)

## 2023-05-12 RX ORDER — ATORVASTATIN CALCIUM 20 MG/1
20 TABLET, FILM COATED ORAL DAILY
Qty: 30 TABLET | Refills: 5 | Status: SHIPPED | OUTPATIENT
Start: 2023-05-12

## 2023-07-11 ENCOUNTER — TRANSCRIBE ORDERS (OUTPATIENT)
Dept: SCHEDULING | Age: 48
End: 2023-07-11

## 2023-07-11 DIAGNOSIS — Z12.31 VISIT FOR SCREENING MAMMOGRAM: Primary | ICD-10-CM

## 2023-07-20 ENCOUNTER — HOSPITAL ENCOUNTER (OUTPATIENT)
Facility: HOSPITAL | Age: 48
Discharge: HOME | End: 2023-07-20
Attending: FAMILY MEDICINE
Payer: MEDICARE

## 2023-07-20 DIAGNOSIS — Z12.31 VISIT FOR SCREENING MAMMOGRAM: ICD-10-CM

## 2023-07-20 PROCEDURE — 77063 BREAST TOMOSYNTHESIS BI: CPT

## (undated) DEVICE — KENDALL SCD EXPRESS SLEEVES, KNEE LENGTH, MEDIUM: Brand: KENDALL SCD

## (undated) DEVICE — ENDO CARRY-ON PROCEDURE KIT INCLUDES ENZYMATIC SPONGE, GAUZE, BIOHAZARD LABEL, TRAY, LUBRICANT, DIRTY SCOPE LABEL, WATER LABEL, TRAY, DRAWSTRING PAD, AND DEFENDO 4-PIECE KIT.: Brand: ENDO CARRY-ON PROCEDURE KIT

## (undated) DEVICE — HYPODERMIC SAFETY NEEDLE: Brand: MONOJECT

## (undated) DEVICE — Z DISCONTINUED NO SUB IDED SET EXTN W/ 4 W STPCOCK M SPIN LOK 36IN

## (undated) DEVICE — INFECTION CONTROL KIT SYS

## (undated) DEVICE — AIRLIFE™ U/CONNECT-IT OXYGEN TUBING 7 FEET (2.1 M) CRUSH-RESISTANT OXYGEN TUBING, VINYL TIPPED: Brand: AIRLIFE™

## (undated) DEVICE — HANDPIECE SET WITH BONE CLEANING TIP AND SUCTION TUBE: Brand: INTERPULSE

## (undated) DEVICE — NDL SPNE QNCKE 18GX3.5IN LF --

## (undated) DEVICE — PREP SKN PREVAIL 40ML APPL --

## (undated) DEVICE — GOWN,SIRUS,NONRNF,SETINSLV,2XL,18/CS: Brand: MEDLINE

## (undated) DEVICE — REM POLYHESIVE ADULT PATIENT RETURN ELECTRODE: Brand: VALLEYLAB

## (undated) DEVICE — TRAP SURG QUAD PARABOLA SLOT DSGN SFTY SCRN TRAPEASE

## (undated) DEVICE — SUTURE VCRL SZ 3-0 L27IN ABSRB UD L26MM SH 1/2 CIR J416H

## (undated) DEVICE — GLOVE ORANGE PI 7 1/2   MSG9075

## (undated) DEVICE — Device: Brand: MEDICAL ACTION INDUSTRIES

## (undated) DEVICE — BONE WAX WHITE: Brand: BONE WAX WHITE

## (undated) DEVICE — TRAY CATH 16F URIN MTR LTX -- CONVERT TO ITEM 363111

## (undated) DEVICE — INSULATED BLADE ELECTRODE: Brand: EDGE

## (undated) DEVICE — CODMAN® INTEGRATED BIPOLAR CORD AND TUBING SET FLYING LEADS, GRAVITY FEED: Brand: CODMAN®

## (undated) DEVICE — DRAIN SURG W7MMXL20CM SIL FULL PERF HUBLESS FLAT RADPQ STRP

## (undated) DEVICE — DEVON™ KNEE AND BODY STRAP 60" X 3" (1.5 M X 7.6 CM): Brand: DEVON

## (undated) DEVICE — SOLUTION IV 250ML 0.9% SOD CHL CLR INJ FLX BG CONT PRT CLSR

## (undated) DEVICE — NEONATAL-ADULT SPO2 SENSOR: Brand: NELLCOR

## (undated) DEVICE — CONNECTOR TBNG AUX H2O JET DISP FOR OLY 160/180 SER

## (undated) DEVICE — BITE BLK ENDOSCP AD 54FR GRN POLYETH ENDOSCP W STRP SLD

## (undated) DEVICE — CATH IV AUTOGRD BC GRN 18GA 30 -- INSYTE

## (undated) DEVICE — SUTURE VCRL 2-0 L27IN ABSRB UD CP-2 L26MM 1/2 CIR REV CUT J869H

## (undated) DEVICE — SUTURE MCRYL SZ 4-0 L27IN ABSRB UD L19MM PS-2 1/2 CIR PRIM Y426H

## (undated) DEVICE — SUTURE NONABSORBABLE MONOFILAMENT CV-6 TTC-9 24 IN GORTX 6K02B

## (undated) DEVICE — GAUZE SPONGES,12 PLY: Brand: CURITY

## (undated) DEVICE — TOOL 14MH30 LEGEND 14CM 3MM: Brand: MIDAS REX ™

## (undated) DEVICE — DRAPE,LAPAROTOMY,T,PEDI,STERILE: Brand: MEDLINE

## (undated) DEVICE — SUTURE VCRL SZ 3-0 L27IN ABSRB UD L24MM PS-1 3/8 CIR PRIM J936H

## (undated) DEVICE — 1200 GUARD II KIT W/5MM TUBE W/O VAC TUBE: Brand: GUARDIAN

## (undated) DEVICE — TEMPORARY FIXATION SCREW

## (undated) DEVICE — SYR 10ML LUER LOK 1/5ML GRAD --

## (undated) DEVICE — MASTISOL ADHESIVE LIQ 2/3ML

## (undated) DEVICE — SYSTEM SKIN CLSR 22CM DERMBND PRINEO

## (undated) DEVICE — NEEDLE HYPO 18GA L1.5IN PNK S STL HUB POLYPR SHLD REG BVL

## (undated) DEVICE — LAMINECTOMY RICHMOND-LF: Brand: MEDLINE INDUSTRIES, INC.

## (undated) DEVICE — CODMAN® INTEGRATED BIPOLAR CORD AND TUBING SET FLYING LEADS, ROTARY PUMP: Brand: CODMAN®

## (undated) DEVICE — DERMABOND SKIN ADH 0.7ML -- DERMABOND ADVANCED 12/BX

## (undated) DEVICE — 4-PORT MANIFOLD: Brand: NEPTUNE 2

## (undated) DEVICE — KENDALL RADIOLUCENT FOAM MONITORING ELECTRODE -RECTANGULAR SHAPE: Brand: KENDALL

## (undated) DEVICE — Device

## (undated) DEVICE — QUILTED PREMIUM COMFORT UNDERPAD,EXTRA HEAVY: Brand: WINGS

## (undated) DEVICE — WRISTBAND ID AD W1XL11.5IN RED POLY ALRG PREPRINTED PERM

## (undated) DEVICE — CONTAINER SPEC 20 ML LID NEUT BUFF FORMALIN 10 % POLYPR STS

## (undated) DEVICE — DRAPE,REIN 53X77,STERILE: Brand: MEDLINE

## (undated) DEVICE — PILLOW POS AD L7IN R FOAM HD REST INTUB SLOT DISP

## (undated) DEVICE — BAG SPEC BIOHZD LF 2MIL 6X10IN -- CONVERT TO ITEM 357326

## (undated) DEVICE — STERILE POLYISOPRENE POWDER-FREE SURGICAL GLOVES WITH EMOLLIENT COATING: Brand: PROTEXIS

## (undated) DEVICE — SET ADMIN 16ML TBNG L100IN 2 Y INJ SITE IV PIGGY BK DISP

## (undated) DEVICE — COVER,TABLE,HEAVY DUTY,60"X90",STRL: Brand: MEDLINE

## (undated) DEVICE — PAD 05IN BASE 3IN PEAK M DENS CONVOLUTED FOAM

## (undated) DEVICE — BAG BELONG PT PERS CLEAR HANDL

## (undated) DEVICE — TOOL 14BA50 LEGEND 14CM 5MM BA: Brand: MIDAS REX ™

## (undated) DEVICE — DRILL BIT WITH STOP, 12MM

## (undated) DEVICE — SYRINGE MED 20ML STD CLR PLAS LUERLOCK TIP N CTRL DISP

## (undated) DEVICE — GARMENT,MEDLINE,DVT,INT,CALF,MED, GEN2: Brand: MEDLINE

## (undated) DEVICE — CATH IV AUTOGRD BC BLU 22GA 25 -- INSYTE

## (undated) DEVICE — HANDLE LT SNAP ON ULT DURABLE LENS FOR TRUMPF ALC DISPOSABLE

## (undated) DEVICE — BW-412T DISP COMBO CLEANING BRUSH: Brand: SINGLE USE COMBINATION CLEANING BRUSH

## (undated) DEVICE — SCREW EXT FIX L14MM FOR DISTRCTN

## (undated) DEVICE — DRAIN KT WND 10FR RND 400ML --

## (undated) DEVICE — SOLUTION IRRIG 1000ML 0.9% SOD CHL USP POUR PLAS BTL

## (undated) DEVICE — BASIN ST MAJOR-NO CAUTERY: Brand: MEDLINE INDUSTRIES, INC.

## (undated) DEVICE — SOLUTION IRRIG 1000ML H2O STRL BLT

## (undated) DEVICE — SOLUTION IV 1000ML 0.9% SOD CHL

## (undated) DEVICE — SUTURE VCRL SZ 3-0 L27IN ABSRB UD CP-2 L26MM 1/2 CIR REV J868H

## (undated) DEVICE — Z DISCONTINUED USE 2751540 TUBING IRRIG L10IN DISP PMP ENDOGATOR

## (undated) DEVICE — SOLUTION IRRIG 3000ML 0.9% SOD CHL FLX CONT 0797208] ICU MEDICAL INC]

## (undated) DEVICE — SMOKE EVACUATION PENCIL: Brand: VALLEYLAB

## (undated) DEVICE — FORCEPS BX L240CM JAW DIA2.8MM L CAP W/ NDL MIC MESH TOOTH

## (undated) DEVICE — SPONGE: SPECIALTY PEANUT XR 100/CS: Brand: MEDICAL ACTION INDUSTRIES

## (undated) DEVICE — CANN NASAL O2 CAPNOGRAPHY AD -- FILTERLINE

## (undated) DEVICE — 3M™ DURAPORE™ SURGICAL TAPE 1538-3, 3 INCH X 10 YARD (7,5CM X 9,1M), 4 ROLLS/BOX: Brand: 3M™ DURAPORE™

## (undated) DEVICE — SYR LR LCK 1ML GRAD NSAF 30ML --

## (undated) DEVICE — PREP SKN CHLRAPRP APL 26ML STR --

## (undated) DEVICE — SURGIFOAM SPNG SZ 100

## (undated) DEVICE — SOLIDIFIER FLUID 3000 CC ABSORB